# Patient Record
Sex: MALE | Race: WHITE
[De-identification: names, ages, dates, MRNs, and addresses within clinical notes are randomized per-mention and may not be internally consistent; named-entity substitution may affect disease eponyms.]

---

## 2017-12-01 ENCOUNTER — HOSPITAL ENCOUNTER (INPATIENT)
Dept: HOSPITAL 10 - E/R | Age: 57
LOS: 12 days | Discharge: HOME | DRG: 683 | End: 2017-12-13
Payer: COMMERCIAL

## 2017-12-01 VITALS
BODY MASS INDEX: 30.34 KG/M2 | BODY MASS INDEX: 30.34 KG/M2 | WEIGHT: 177.69 LBS | HEIGHT: 64 IN | WEIGHT: 177.69 LBS | HEIGHT: 64 IN

## 2017-12-01 VITALS — SYSTOLIC BLOOD PRESSURE: 181 MMHG | RESPIRATION RATE: 16 BRPM | DIASTOLIC BLOOD PRESSURE: 77 MMHG

## 2017-12-01 VITALS — TEMPERATURE: 97.8 F

## 2017-12-01 DIAGNOSIS — N18.9: ICD-10-CM

## 2017-12-01 DIAGNOSIS — Z79.84: ICD-10-CM

## 2017-12-01 DIAGNOSIS — I87.8: ICD-10-CM

## 2017-12-01 DIAGNOSIS — E11.622: ICD-10-CM

## 2017-12-01 DIAGNOSIS — N40.1: ICD-10-CM

## 2017-12-01 DIAGNOSIS — E03.9: ICD-10-CM

## 2017-12-01 DIAGNOSIS — I12.9: ICD-10-CM

## 2017-12-01 DIAGNOSIS — N17.9: Primary | ICD-10-CM

## 2017-12-01 DIAGNOSIS — E78.5: ICD-10-CM

## 2017-12-01 DIAGNOSIS — E11.42: ICD-10-CM

## 2017-12-01 DIAGNOSIS — D50.9: ICD-10-CM

## 2017-12-01 DIAGNOSIS — E87.1: ICD-10-CM

## 2017-12-01 DIAGNOSIS — E11.22: ICD-10-CM

## 2017-12-01 DIAGNOSIS — R33.8: ICD-10-CM

## 2017-12-01 DIAGNOSIS — D63.1: ICD-10-CM

## 2017-12-01 DIAGNOSIS — L03.116: ICD-10-CM

## 2017-12-01 DIAGNOSIS — I27.20: ICD-10-CM

## 2017-12-01 DIAGNOSIS — L97.929: ICD-10-CM

## 2017-12-01 DIAGNOSIS — E87.5: ICD-10-CM

## 2017-12-01 LAB
ALBUMIN SERPL-MCNC: 3.6 G/DL (ref 3.3–4.9)
ALBUMIN/GLOB SERPL: 1.09 {RATIO}
ALP SERPL-CCNC: 125 IU/L (ref 42–121)
ALT SERPL-CCNC: 40 IU/L (ref 13–69)
ANION GAP SERPL CALC-SCNC: 17 MMOL/L (ref 8–16)
AST SERPL-CCNC: 33 IU/L (ref 15–46)
BASOPHILS # BLD AUTO: 0.1 10^3/UL (ref 0–0.1)
BASOPHILS NFR BLD: 0.6 % (ref 0–2)
BILIRUB DIRECT SERPL-MCNC: 0 MG/DL (ref 0–0.2)
BILIRUB SERPL-MCNC: 0.8 MG/DL (ref 0.2–1.3)
BUN SERPL-MCNC: 35 MG/DL (ref 7–20)
CALCIUM SERPL-MCNC: 8.8 MG/DL (ref 8.4–10.2)
CHLORIDE SERPL-SCNC: 106 MMOL/L (ref 97–110)
CO2 SERPL-SCNC: 23 MMOL/L (ref 21–31)
CREAT SERPL-MCNC: 3 MG/DL (ref 0.61–1.24)
EOSINOPHIL # BLD: 0.4 10^3/UL (ref 0–0.5)
EOSINOPHIL NFR BLD: 3.5 % (ref 0–7)
ERYTHROCYTE [DISTWIDTH] IN BLOOD BY AUTOMATED COUNT: 12.6 % (ref 11.5–14.5)
GLOBULIN SER-MCNC: 3.3 G/DL (ref 1.3–3.2)
GLUCOSE SERPL-MCNC: 126 MG/DL (ref 70–220)
HCT VFR BLD CALC: 32.4 % (ref 42–52)
HGB BLD-MCNC: 11.3 G/DL (ref 14–18)
LYMPHOCYTES # BLD AUTO: 2.5 10^3/UL (ref 0.8–2.9)
LYMPHOCYTES NFR BLD AUTO: 24.4 % (ref 15–51)
MCH RBC QN AUTO: 30.4 PG (ref 29–33)
MCHC RBC AUTO-ENTMCNC: 34.9 G/DL (ref 32–37)
MCV RBC AUTO: 87.1 FL (ref 82–101)
MONOCYTES # BLD: 0.7 10^3/UL (ref 0.3–0.9)
MONOCYTES NFR BLD: 7 % (ref 0–11)
NEUTROPHILS # BLD: 6.5 10^3/UL (ref 1.6–7.5)
NEUTROPHILS NFR BLD AUTO: 64.2 % (ref 39–77)
NRBC # BLD MANUAL: 0 10^3/UL (ref 0–0)
NRBC BLD AUTO-RTO: 0 /100WBC (ref 0–0)
PLATELET # BLD: 163 10^3/UL (ref 140–415)
PMV BLD AUTO: 11.5 FL (ref 7.4–10.4)
POTASSIUM SERPL-SCNC: 5.5 MMOL/L (ref 3.5–5.1)
PROT SERPL-MCNC: 6.9 G/DL (ref 6.1–8.1)
RBC # BLD AUTO: 3.72 10^6/UL (ref 4.7–6.1)
SODIUM SERPL-SCNC: 140 MMOL/L (ref 135–144)
TROPONIN I SERPL-MCNC: < 0.012 NG/ML (ref 0–0.12)
WBC # BLD AUTO: 10.1 10^3/UL (ref 4.8–10.8)

## 2017-12-01 PROCEDURE — 93970 EXTREMITY STUDY: CPT

## 2017-12-01 PROCEDURE — 84484 ASSAY OF TROPONIN QUANT: CPT

## 2017-12-01 PROCEDURE — 96375 TX/PRO/DX INJ NEW DRUG ADDON: CPT

## 2017-12-01 PROCEDURE — 83540 ASSAY OF IRON: CPT

## 2017-12-01 PROCEDURE — 83935 ASSAY OF URINE OSMOLALITY: CPT

## 2017-12-01 PROCEDURE — 82043 UR ALBUMIN QUANTITATIVE: CPT

## 2017-12-01 PROCEDURE — 82150 ASSAY OF AMYLASE: CPT

## 2017-12-01 PROCEDURE — 85025 COMPLETE CBC W/AUTO DIFF WBC: CPT

## 2017-12-01 PROCEDURE — 87040 BLOOD CULTURE FOR BACTERIA: CPT

## 2017-12-01 PROCEDURE — 87086 URINE CULTURE/COLONY COUNT: CPT

## 2017-12-01 PROCEDURE — 82728 ASSAY OF FERRITIN: CPT

## 2017-12-01 PROCEDURE — 84100 ASSAY OF PHOSPHORUS: CPT

## 2017-12-01 PROCEDURE — 84450 TRANSFERASE (AST) (SGOT): CPT

## 2017-12-01 PROCEDURE — 82550 ASSAY OF CK (CPK): CPT

## 2017-12-01 PROCEDURE — 76705 ECHO EXAM OF ABDOMEN: CPT

## 2017-12-01 PROCEDURE — 84153 ASSAY OF PSA TOTAL: CPT

## 2017-12-01 PROCEDURE — 81001 URINALYSIS AUTO W/SCOPE: CPT

## 2017-12-01 PROCEDURE — 96374 THER/PROPH/DIAG INJ IV PUSH: CPT

## 2017-12-01 PROCEDURE — 80061 LIPID PANEL: CPT

## 2017-12-01 PROCEDURE — 84295 ASSAY OF SERUM SODIUM: CPT

## 2017-12-01 PROCEDURE — 84300 ASSAY OF URINE SODIUM: CPT

## 2017-12-01 PROCEDURE — 80053 COMPREHEN METABOLIC PANEL: CPT

## 2017-12-01 PROCEDURE — 82962 GLUCOSE BLOOD TEST: CPT

## 2017-12-01 PROCEDURE — 83036 HEMOGLOBIN GLYCOSYLATED A1C: CPT

## 2017-12-01 PROCEDURE — 84443 ASSAY THYROID STIM HORMONE: CPT

## 2017-12-01 PROCEDURE — 96372 THER/PROPH/DIAG INJ SC/IM: CPT

## 2017-12-01 PROCEDURE — 83930 ASSAY OF BLOOD OSMOLALITY: CPT

## 2017-12-01 PROCEDURE — 36415 COLL VENOUS BLD VENIPUNCTURE: CPT

## 2017-12-01 PROCEDURE — 84460 ALANINE AMINO (ALT) (SGPT): CPT

## 2017-12-01 PROCEDURE — 93306 TTE W/DOPPLER COMPLETE: CPT

## 2017-12-01 PROCEDURE — 82553 CREATINE MB FRACTION: CPT

## 2017-12-01 PROCEDURE — 76775 US EXAM ABDO BACK WALL LIM: CPT

## 2017-12-01 PROCEDURE — 83735 ASSAY OF MAGNESIUM: CPT

## 2017-12-01 PROCEDURE — 83690 ASSAY OF LIPASE: CPT

## 2017-12-01 PROCEDURE — 80048 BASIC METABOLIC PNL TOTAL CA: CPT

## 2017-12-01 PROCEDURE — 93005 ELECTROCARDIOGRAM TRACING: CPT

## 2017-12-01 PROCEDURE — 84439 ASSAY OF FREE THYROXINE: CPT

## 2017-12-01 PROCEDURE — 71010: CPT

## 2017-12-01 PROCEDURE — 84154 ASSAY OF PSA FREE: CPT

## 2017-12-01 PROCEDURE — 74176 CT ABD & PELVIS W/O CONTRAST: CPT

## 2017-12-01 RX ADMIN — HYDRALAZINE HYDROCHLORIDE PRN MG: 20 INJECTION INTRAMUSCULAR; INTRAVENOUS at 23:13

## 2017-12-01 RX ADMIN — HEPARIN SODIUM SCH UNIT: 5000 INJECTION, SOLUTION INTRAVENOUS; SUBCUTANEOUS at 23:30

## 2017-12-01 NOTE — RADRPT
PROCEDURE:   US Lower extremity Venous. 

 

CLINICAL INDICATION:   Bilateral lower extremity edema , pain

 

TECHNIQUE:   Multiple sonographic images of the bilateral lower extremity deep venous system was obt
ained utilizing grayscale, color-flow, compressive sonography and doppler imaging with augmentation.
  The images were reviewed on a PACS workstation. 

 

COMPARISON:   None. 

 

FINDINGS:

There is normal compressibility and flow within the bilateral common femoral, femoral , posterior ti
bial and popliteal veins.  

 

RPTAT: AA

 

IMPRESSION:

No sonographic evidence for deep venous thrombosis.

_____________________________________________ 

.Chandan Gaines MD, MD           Date    Time 

Electronically viewed and signed by .Chandan Gaines MD, MD on 12/01/2017 21:04 

 

D:  12/01/2017 21:04  T:  12/01/2017 21:04

.S/

## 2017-12-01 NOTE — ERD
ER Documentation


Chief Complaint


Chief Complaint


Pt with LLE ulcer X 3 days, u. Pt diabetic





HPI


57-year-old man presents with left medial lower leg redness 3 days after 

scratching it for a few days.  He is also worried about bilateral lower 

extremity edema 3 days, he denies previous lower extremity edema.  He has a 

history of hypertension and states he has been using his medications as 

prescribed.  He denies fevers or chills, no difficulty ambulating, no chest 

pain or shortness of breath, no headache or blurry vision.  Patient denies 

recent trauma.





ROS


All systems reviewed and are negative except as per history of present illness.





Medications


Home Meds


Active Scripts


Cephalexin* (Keflex*) 500 Mg Capsule, 500 MG PO TID for 7 Days, CAP


   Prov:OLE MCKEON MD         12/1/17


Reported Medications


Neomy Sulf/Polymyx B Sulf/Pram (NEOSPORIN + PAIN RELIEF CREAM) 14.2 Gm Cream..g.

, 14.2 GM TP AS NEEDED


   12/1/17


Levothyroxine Sodium* (Levothyroxine Sodium*) 75 Mcg Tablet, 75 MCG PO BEFORE 

BREAKFAST, #30 TAB


   12/1/17


Atorvastatin* (Atorvastatin*) 40 Mg Tablet, 40 MG PO QHS, #30 TAB


   12/1/17


Losartan Potassium* (Losartan Potassium*) 100 Mg Tablet, 100 MG PO DAILY, TAB


   12/1/17


Omega-3 Acid Ethyl Esters (Lovaza) 1 Gm Capsule, 1 GM PO BID, CAP


   12/1/17


Gabapentin* (Gabapentin*) 600 Mg Tablet, 600 MG PO TID, #90 TAB


   12/1/17


Carvedilol* (Carvedilol*) 12.5 Mg Tablet, 12.5 MG PO BID, #60 TAB


   12/1/17





Allergies


Allergies:  


Coded Allergies:  


     No Known Allergy (Unverified , 12/1/17)





PMhx/Soc


Hypertension


History of Surgery:  No


Anesthesia Reaction:  No


Hx Neurological Disorder:  No


Hx Respiratory Disorders:  No


Hx Cardiac Disorders:  Yes (HTN, HYPERLIPIDEMIA)


Hx Psychiatric Problems:  No


Hx Miscellaneous Medical Probl:  Yes (DM)


Hx Alcohol Use:  No (not for 40 years)


Hx Substance Use:  No


Hx Tobacco Use:  No


Smoking Status:  Unknown if ever smoked





FmHx


Family History:  No diabetes





Physical Exam


Vitals





Vital Signs








  Date Time  Temp Pulse Resp B/P Pulse Ox O2 Delivery O2 Flow Rate FiO2


 


12/1/17 22:43  75 15 194/94 100 Room Air  


 


12/1/17 21:29  67 11 191/92 100 Room Air  


 


12/1/17 21:03 97.8 77 16 200/97 100 Room Air  


 


12/1/17 19:38  80 20 215/112 100 Room Air  


 


12/1/17 17:00 98.3 91 18 234/114 100   








Physical Exam


GENERAL: Well-developed, well-nourished, well-hydrated, in no apparent distress

, looks nontoxic in appearance


HEENT: Moist mucous membranes, pink conjunctiva, no cervical spine tenderness 

or step-off deformities, no goiter, no jaundice or icterus, extraocular 

movements intact without pain. No submandibular induration, and no pharyngeal 

erythema


NEURO: Alert and oriented 3, cranial nerves II through XII intact bilaterally, 

pupils equal round reactive to light, no focal deficits or facial asymmetry, 

sensation intact distally Strength 5/5 in upper and lower extremities 

bilaterally


CARDIAC: Regular rate and rhythm, no murmurs rubs or gallops


LUNGS: Clear bilaterally no wheezing crackles or stridor


ABDOMEN: Soft nontender, no guarding, no rigidity, no rebound, no psoas sign no 

obturator sign. Normoactive bowel sounds


SKIN: Warm and dry to touch, there is superficial erythema to the medial left 

lower leg just proximal to the medial ankle a very superficial ulceration is 

noted without active drainage or discharge, no purulence noted.  There is no 

skin induration noted.  There is chronic stasis dermatitis changes noted to 

both lower extremities.


EXTREMITIES: No clubbing cyanosis, 3+ pitting edema in the lower extremities 

bilaterally, calves are bilaterally symmetrical, no Homans sign, no popliteal 

cord sign. Distal pulses equal and bilateral


PSYCH: Normal affect without agitation or irritability


Result Diagram:  


12/1/17 2057 12/1/17 2057





Results 24 hrs





 Laboratory Tests








Test


  12/1/17


20:57


 


White Blood Count 10.110^3/ul 


 


Red Blood Count 3.7210^6/ul 


 


Hemoglobin 11.3g/dl 


 


Hematocrit 32.4% 


 


Mean Corpuscular Volume 87.1fl 


 


Mean Corpuscular Hemoglobin 30.4pg 


 


Mean Corpuscular Hemoglobin


Concent 34.9g/dl 


 


 


Red Cell Distribution Width 12.6% 


 


Platelet Count 85341^3/UL 


 


Mean Platelet Volume 11.5fl 


 


Neutrophils % 64.2% 


 


Lymphocytes % 24.4% 


 


Monocytes % 7.0% 


 


Eosinophils % 3.5% 


 


Basophils % 0.6% 


 


Nucleated Red Blood Cells % 0.0/100WBC 


 


Neutrophils # 6.510^3/ul 


 


Lymphocytes # 2.510^3/ul 


 


Monocytes # 0.710^3/ul 


 


Eosinophils # 0.410^3/ul 


 


Basophils # 0.110^3/ul 


 


Nucleated Red Blood Cells # 0.010^3/ul 


 


Sodium Level 140mmol/L 


 


Potassium Level 5.5mmol/L 


 


Chloride Level 106mmol/L 


 


Carbon Dioxide Level 23mmol/L 


 


Anion Gap 17 


 


Blood Urea Nitrogen 35mg/dl 


 


Creatinine 3.00mg/dl 


 


Glucose Level 126mg/dl 


 


Calcium Level 8.8mg/dl 


 


Total Bilirubin 0.8mg/dl 


 


Direct Bilirubin 0.00mg/dl 


 


Indirect Bilirubin 0.8mg/dl 


 


Aspartate Amino Transf


(AST/SGOT) 33IU/L 


 


 


Alanine Aminotransferase


(ALT/SGPT) 40IU/L 


 


 


Alkaline Phosphatase 125IU/L 


 


Troponin I < 0.012ng/ml 


 


Total Protein 6.9g/dl 


 


Albumin 3.6g/dl 


 


Globulin 3.30g/dl 


 


Albumin/Globulin Ratio 1.09 


 


Lipase 514U/L 








 Current Medications








 Medications


  (Trade)  Dose


 Ordered  Sig/Marium


 Route


 PRN Reason  Start Time


 Stop Time Status Last Admin


Dose Admin


 


 Enalaprilat


  (Vasotec Iv)  1.25 mg  ONCE  ONCE


 IV


   12/1/17 20:00


 12/1/17 20:01 DC 12/1/17 20:53


 


 


 Dextrose


  (D50w Syringe)  25 ml  ONCE  ONCE


 IV


   12/1/17 22:30


 12/1/17 22:31 DC 12/1/17 22:35


 


 


 Insulin Human


 Regular


  (Humulin R)  8 unit  ONCE  ONCE


 IV


   12/1/17 22:30


 12/1/17 22:31 DC 12/1/17 22:40


 


 


 Cephalexin


  (Keflex)  500 mg  ONCE  ONCE


 PO


   12/1/17 22:30


 12/1/17 22:31 DC 12/1/17 22:35


 


 


 IV Flush


  (NS 3 ml)  3 ml  PER PROTOCOL


 IV


   12/1/17 23:00


     


 


 


 Ondansetron HCl


  (Zofran Tab)  4 mg  Q6H  PRN


 PO


 NAUSEA AND/OR VOMITING  12/1/17 23:00


   UNV  


 


 


 Furosemide


  (Lasix)  40 mg  ONCE  ONCE


 IV


   12/1/17 23:00


 12/1/17 23:01   


 


 


 Acetaminophen


  (Tylenol Tab)  650 mg  Q6H  PRN


 PO


 PAIN LEVEL 1-3 OR FEVER  12/1/17 23:00


     


 


 


 Acetaminophen/


 Hydrocodone Bitart


  (Norco (5/325))  1 tab  Q6H  PRN


 PO


 PAIN LEVEL 4-6  12/1/17 23:00


     


 


 


 Heparin Sodium


  (Porcine)


  (Heparin  (5000


 Units/0.5 ml))  5,000 unit  Q8


 SC


   12/1/17 23:00


     


 


 


 Atorvastatin


 Calcium


  (Lipitor)  40 mg  QHS


 PO


   12/2/17 21:00


   UNV  


 


 


 Carvedilol


  (Coreg)  12.5 mg  BID


 PO


   12/1/17 23:00


   UNV  


 


 


 Gabapentin


  (Neurontin)  600 mg  TID


 PO


   12/2/17 09:00


   UNV  


 


 


 Levothyroxine


 Sodium


  (Synthroid)  75 mcg  BEFORE  BREAKFAST


 PO


   12/2/17 07:00


   UNV  


 


 


 Miscellaneous


 Information  1 gm  BID


 PO


   12/2/17 09:00


   UNV  


 


 


 Hydralazine HCl


  (Apresoline)  10 mg  Q4H  PRN


 IV


 ELEVATED BLOOD PRESSURE  12/1/17 23:00


   UNV  


 


 


 Miscellaneous


 Information


  (* Miscellaneous


 Pharmacy Order)  Discontinue


 current oral


 sulfonylur...  ONCE  ONCE


 XX


   12/1/17 23:00


 12/1/17 23:01 UNV  


 


 


 Diagnostic Test


  (Pha)


  (Accu-Chek)  1 ea  02


 XX


   12/2/17 02:00


   UNV  


 


 


 Miscellaneous


 Information


  (* Miscellaneous


 Pharmacy Order)  HYPOGLYCEMIA


 PROTOCOL


 w...  ONCE  ONCE


 XX


   12/1/17 23:00


 12/1/17 23:01 UNV  


 


 


 Insulin Aspart


  (Novolog Insulin


 Pen)  NOVOLOG


 *MILD*


 ALGORITHM  WITH MEALS  BEDTIME


 SC


   12/2/17 08:00


   UNV  


 


 


 Miscellaneous


 Information


  (* Miscellaneous


 Pharmacy Order)  Discontinue


 all


 previ...  ONCE  ONCE


 XX


   12/1/17 23:00


 12/1/17 23:01 UNV  


 











Procedures/MDM


IV line was established patient was placed on cardiac monitor rhythm strip 

revealed a sinus rhythm at about 80 bpm with upright P and T waves.  Patient 

was afebrile





EKG performed, read by me: Reveals a normal sinus rhythm at 78 bpm, normal axis

, right ventricular conduction delay with incarceration of 110 ms, LVH in lead I

, no concerning ST elevations or depressions noted.





Chest X-ray 1V Interpreted by me:


Soft Tissue:                                               No acute 

abnormalities


Bones:                                                    No acute abnormalities


Mediastinum/Cardiac Silhouette/Lungs:     No acute abnormalities





Vascular Doppler ultrasound of the bilateral lower extremities was performed, 

no acute deep vein thrombosis was noted.





I administered enalapril 1.25 mg IV for hypertension.





CBC was unremarkable, electrolytes revealed renal failure with a BUN/creatinine 

of 35/3, acute hyperkalemia of 5.5, liver function tests are normal, troponin 

was negative.





I administered dextrose 25 g IV 1 and regular insulin 8 units IV 1 for acute 

hyperkalemia.  I also administered cephalexin 500 mg p.o. 1 for superficial 

left lower extremity leg ulcer





Patient has no history of renal issues and recently developed lower extremity 

edema, creatinine today was 3.0 with elevated potassium.  He will be admitted 

to telemetry setting for continued medical management.





Departure


Diagnosis:  


 Primary Impression:  


 Stasis dermatitis of both legs


 Additional Impressions:  


 Hypertension


 Hypertension type:  essential hypertension  Qualified Code:  I10 - Essential 

hypertension


 Acute hyperkalemia


 Acute renal failure


 Acute renal failure type:  unspecified  Qualified Code:  N17.9 - Acute renal 

failure, unspecified acute renal failure type


 Venous ulcer of leg


 Laterality:  left  Qualified Code:  I83.029 - Venous stasis ulcer of left 

lower extremity


Condition:  OLE Banda MD Dec 1, 2017 19:59

## 2017-12-01 NOTE — RADRPT
PROCEDURE:   XR Chest. 

 

CLINICAL INDICATION:    chest pain

 

TECHNIQUE:   Single frontal view of the chest was obtained 

 

COMPARISON:   None 

 

FINDINGS:

The heart and mediastinum are within normal limits.  

The lungs are clear.

There is no pleural effusion or pneumothorax.   

 

RPTAT: AA

 

IMPRESSION:

No acute disease.

_____________________________________________ 

.Chandan Gaines MD, MD           Date    Time 

Electronically viewed and signed by .Chandan Gaines MD, MD on 12/01/2017 22:36 

 

D:  12/01/2017 22:36  T:  12/01/2017 22:36

.S/

## 2017-12-02 VITALS — SYSTOLIC BLOOD PRESSURE: 109 MMHG | RESPIRATION RATE: 18 BRPM | DIASTOLIC BLOOD PRESSURE: 64 MMHG | HEART RATE: 71 BPM

## 2017-12-02 VITALS — HEART RATE: 80 BPM

## 2017-12-02 VITALS — SYSTOLIC BLOOD PRESSURE: 135 MMHG | RESPIRATION RATE: 17 BRPM | DIASTOLIC BLOOD PRESSURE: 65 MMHG

## 2017-12-02 VITALS — SYSTOLIC BLOOD PRESSURE: 100 MMHG | DIASTOLIC BLOOD PRESSURE: 57 MMHG | RESPIRATION RATE: 20 BRPM

## 2017-12-02 VITALS — HEART RATE: 75 BPM

## 2017-12-02 VITALS — SYSTOLIC BLOOD PRESSURE: 156 MMHG | DIASTOLIC BLOOD PRESSURE: 76 MMHG | HEART RATE: 81 BPM

## 2017-12-02 VITALS — SYSTOLIC BLOOD PRESSURE: 185 MMHG | DIASTOLIC BLOOD PRESSURE: 94 MMHG | RESPIRATION RATE: 18 BRPM

## 2017-12-02 VITALS — DIASTOLIC BLOOD PRESSURE: 73 MMHG | SYSTOLIC BLOOD PRESSURE: 144 MMHG

## 2017-12-02 VITALS — DIASTOLIC BLOOD PRESSURE: 75 MMHG | SYSTOLIC BLOOD PRESSURE: 167 MMHG | RESPIRATION RATE: 19 BRPM

## 2017-12-02 VITALS — SYSTOLIC BLOOD PRESSURE: 194 MMHG | DIASTOLIC BLOOD PRESSURE: 96 MMHG | HEART RATE: 70 BPM

## 2017-12-02 VITALS — HEART RATE: 76 BPM | DIASTOLIC BLOOD PRESSURE: 87 MMHG | SYSTOLIC BLOOD PRESSURE: 176 MMHG

## 2017-12-02 VITALS — HEART RATE: 68 BPM

## 2017-12-02 LAB
ADD UMIC: YES
ALBUMIN SERPL-MCNC: 2.9 G/DL (ref 3.3–4.9)
ALBUMIN/GLOB SERPL: 0.85 {RATIO}
ALP SERPL-CCNC: 105 IU/L (ref 42–121)
ALT SERPL-CCNC: 36 IU/L (ref 13–69)
ANION GAP SERPL CALC-SCNC: 11 MMOL/L (ref 8–16)
AST SERPL-CCNC: 27 IU/L (ref 15–46)
BASOPHILS # BLD AUTO: 0.1 10^3/UL (ref 0–0.1)
BASOPHILS NFR BLD: 0.5 % (ref 0–2)
BILIRUB DIRECT SERPL-MCNC: 0 MG/DL (ref 0–0.2)
BILIRUB SERPL-MCNC: 0.8 MG/DL (ref 0.2–1.3)
BUN SERPL-MCNC: 38 MG/DL (ref 7–20)
CALCIUM SERPL-MCNC: 8.8 MG/DL (ref 8.4–10.2)
CHLORIDE SERPL-SCNC: 107 MMOL/L (ref 97–110)
CHOLEST SERPL-MCNC: 155 MG/DL (ref 100–200)
CHOLEST/HDLC SERPL: 6.7 RATIO
CO2 SERPL-SCNC: 26 MMOL/L (ref 21–31)
COLOR UR: (no result)
CREAT SERPL-MCNC: 3.49 MG/DL (ref 0.61–1.24)
EOSINOPHIL # BLD: 0.3 10^3/UL (ref 0–0.5)
EOSINOPHIL NFR BLD: 3.5 % (ref 0–7)
ERYTHROCYTE [DISTWIDTH] IN BLOOD BY AUTOMATED COUNT: 12.4 % (ref 11.5–14.5)
GLOBULIN SER-MCNC: 3.4 G/DL (ref 1.3–3.2)
GLUCOSE SERPL-MCNC: 182 MG/DL (ref 70–220)
GLUCOSE UR STRIP-MCNC: (no result) MG/DL
HCT VFR BLD CALC: 29.2 % (ref 42–52)
HDLC SERPL-MCNC: 23 MG/DL (ref 30–78)
HGB BLD-MCNC: 10.4 G/DL (ref 14–18)
IRON SERPL-MCNC: 34 UG/DL (ref 35–150)
KETONES UR STRIP.AUTO-MCNC: NEGATIVE MG/DL
LYMPHOCYTES # BLD AUTO: 2.1 10^3/UL (ref 0.8–2.9)
LYMPHOCYTES NFR BLD AUTO: 23.1 % (ref 15–51)
MAGNESIUM SERPL-MCNC: 1.7 MG/DL (ref 1.7–2.5)
MCH RBC QN AUTO: 31.2 PG (ref 29–33)
MCHC RBC AUTO-ENTMCNC: 35.6 G/DL (ref 32–37)
MCV RBC AUTO: 87.7 FL (ref 82–101)
MONOCYTES # BLD: 0.6 10^3/UL (ref 0.3–0.9)
MONOCYTES NFR BLD: 6.1 % (ref 0–11)
NEUTROPHILS # BLD: 6.1 10^3/UL (ref 1.6–7.5)
NEUTROPHILS NFR BLD AUTO: 66.5 % (ref 39–77)
NITRITE UR QL STRIP.AUTO: NEGATIVE MG/DL
NRBC # BLD MANUAL: 0 10^3/UL (ref 0–0)
NRBC BLD AUTO-RTO: 0 /100WBC (ref 0–0)
PLATELET # BLD: 149 10^3/UL (ref 140–415)
PMV BLD AUTO: 11.8 FL (ref 7.4–10.4)
POTASSIUM SERPL-SCNC: 4.5 MMOL/L (ref 3.5–5.1)
PROT SERPL-MCNC: 6.3 G/DL (ref 6.1–8.1)
RBC # BLD AUTO: 3.33 10^6/UL (ref 4.7–6.1)
RBC # UR AUTO: NEGATIVE MG/DL
SODIUM SERPL-SCNC: 139 MMOL/L (ref 135–144)
TIBC SERPL-MCNC: 201 UG/DL (ref 241–421)
TRIGL SERPL-MCNC: 243 MG/DL (ref 0–149)
TSH SERPL-ACNC: 7.7 MIU/L (ref 0.47–4.68)
UR ASCORBIC ACID: NEGATIVE MG/DL
UR BILIRUBIN (DIP): NEGATIVE MG/DL
UR CLARITY: CLEAR
UR PH (DIP): 7 (ref 5–9)
UR RBC: 1 /HPF (ref 0–5)
UR SPECIFIC GRAVITY (DIP): 1.01 (ref 1–1.03)
UR TOTAL PROTEIN (DIP): (no result) MG/DL
UROBILINOGEN UR STRIP-ACNC: NEGATIVE MG/DL
WBC # BLD AUTO: 9.2 10^3/UL (ref 4.8–10.8)
WBC # UR STRIP: NEGATIVE LEU/UL

## 2017-12-02 RX ADMIN — OMEGA-3 FATTY ACIDS CAP DELAYED RELEASE 1000 MG SCH MG: 1000 CAPSULE DELAYED RELEASE at 08:37

## 2017-12-02 RX ADMIN — Medication SCH EACH: at 08:38

## 2017-12-02 RX ADMIN — HEPARIN SODIUM SCH UNIT: 5000 INJECTION, SOLUTION INTRAVENOUS; SUBCUTANEOUS at 21:54

## 2017-12-02 RX ADMIN — HYDRALAZINE HYDROCHLORIDE PRN MG: 20 INJECTION INTRAMUSCULAR; INTRAVENOUS at 08:41

## 2017-12-02 RX ADMIN — ATORVASTATIN CALCIUM SCH MG: 40 TABLET, FILM COATED ORAL at 00:48

## 2017-12-02 RX ADMIN — GABAPENTIN SCH MG: 300 CAPSULE ORAL at 08:38

## 2017-12-02 RX ADMIN — CLINDAMYCIN HYDROCHLORIDE SCH MG: 300 CAPSULE ORAL at 17:31

## 2017-12-02 RX ADMIN — LEVOTHYROXINE SODIUM SCH MCG: 75 TABLET ORAL at 06:52

## 2017-12-02 RX ADMIN — TERAZOSIN HYDROCHLORIDE ANHYDROUS SCH MG: 1 CAPSULE ORAL at 21:48

## 2017-12-02 RX ADMIN — HEPARIN SODIUM SCH UNIT: 5000 INJECTION, SOLUTION INTRAVENOUS; SUBCUTANEOUS at 05:31

## 2017-12-02 RX ADMIN — SODIUM FERRIC GLUCONATE COMPLEX SCH MLS/HR: 12.5 INJECTION INTRAVENOUS at 16:07

## 2017-12-02 RX ADMIN — GABAPENTIN SCH MG: 300 CAPSULE ORAL at 20:45

## 2017-12-02 RX ADMIN — HEPARIN SODIUM SCH UNIT: 5000 INJECTION, SOLUTION INTRAVENOUS; SUBCUTANEOUS at 13:51

## 2017-12-02 RX ADMIN — NIFEDIPINE SCH MG: 60 TABLET, FILM COATED, EXTENDED RELEASE ORAL at 20:47

## 2017-12-02 RX ADMIN — ATORVASTATIN CALCIUM SCH MG: 40 TABLET, FILM COATED ORAL at 20:45

## 2017-12-02 RX ADMIN — CLINDAMYCIN HYDROCHLORIDE SCH MG: 300 CAPSULE ORAL at 12:42

## 2017-12-02 RX ADMIN — Medication SCH EACH: at 20:44

## 2017-12-02 RX ADMIN — NIFEDIPINE SCH MG: 60 TABLET, FILM COATED, EXTENDED RELEASE ORAL at 12:42

## 2017-12-02 RX ADMIN — CLINDAMYCIN HYDROCHLORIDE SCH MG: 300 CAPSULE ORAL at 05:31

## 2017-12-02 RX ADMIN — OMEGA-3 FATTY ACIDS CAP DELAYED RELEASE 1000 MG SCH MG: 1000 CAPSULE DELAYED RELEASE at 20:44

## 2017-12-02 RX ADMIN — GABAPENTIN SCH MG: 300 CAPSULE ORAL at 12:42

## 2017-12-02 NOTE — RADRPT
PROCEDURE:   US abdomen limited right upper quadrant. 

 

CLINICAL INDICATION:  Elevated lipase levels

 

TECHNIQUE:   Multiple real-time images were acquired of the patient's right upper quadrant of the ab
domen utilizing a high resolution transducer. 

 

COMPARISON:   None 

 

FINDINGS:

 

No gallstones are identified within the gallbladder.  

There is no pericholecystic fluid or gallbladder wall thickening.  

The common bile duct measures 2.7 mm in maximal dimension.  

No free fluid is identified. No abnormality of the liver is seen. The length of the right lobe of th
e liver equals 15.7 cm, within normal limits. There is hepatopetal portal venous flow. The pancreas 
is not well seen due to bowel gas. The right kidney measures 11.2 cm in length and is unremarkable.

 

IMPRESSION:

Pancreas not well seen. Otherwise unremarkable examination. Please see above.

 

 

RPTAT: HJES

_____________________________________________ 

.Bolivar Do MD, MD           Date    Time 

Electronically viewed and signed by .Bolivar Do MD, MD on 12/02/2017 16:47 

 

D:  12/02/2017 16:47  T:  12/02/2017 16:47

.S/

## 2017-12-02 NOTE — CONS
DATE OF ADMISSION: 12/01/2017

DATE OF CONSULTATION:  12/02/2017

 

 

 

INFECTIOUS DISEASE CONSULTATION

 

REASON FOR CONSULTATION:  Antibiotic management.

 

HISTORY OF PRESENT ILLNESS:  Eliud Hall is a 57-year-old  male who comes in with numerou
s problems, especially left lower extremity cellulitis and is being seen for antibiotic management. 
 His past problems include:

1.  Adult-onset diabetes mellitus with peripheral neuropathy.

2.  Hypertension.

3.  Hypothyroidism.

4.  Hyperlipidemia.  

 

Acutely, he comes in with cellulitis versus wound from his left lower extremity.  He notes that he h
as had redness the last 3 days after scratching the wound.  He has bilateral lower extremity edema. 
 He denies fever or chills.  On admission, his white count is 10.1, H and H 11.3 and 32.4, platelet 
count 162,000.  BUN and creatinine 35/3.0.

 

PAST MEDICAL HISTORY:  Operations:  None.

 

FAMILY HISTORY:  Noncontributory.

 

SOCIAL HISTORY:  He does not smoke, drink or abuse drugs.

 

ALLERGIES:  NONE TO PENICILLIN, SULFA OR FOODS.

 

MEDICATIONS:  Per chart.

 

REVIEW OF SYSTEMS:  Noncontributory.

 

PHYSICAL EXAMINATION:

GENERAL:  The patient is a well-developed, well-nourished male, alert, responsive, in no acute distr
ess.

VITAL SIGNS:  Stable.  He is afebrile.

SKIN:  Without generalized rash.

HEENT:  Within normal limits.

NECK:  Supple.

LYMPH NODES:  None palpable.

CHEST:  Decreased breath sounds at the bases.

HEART:  Without murmur or gallop.

ABDOMEN:  Soft, nontender, without organosplenomegaly or masses.

EXTREMITIES:  1+ pitting edema.

RECTAL/GENITAL:  Exam is deferred.

NEUROLOGIC:  No focal neurological abnormalities.  His left lower extremity is erythematous above th
e medial malleolus with warmth to palpation.

 

IMPRESSION AND PLAN:  The patient was begun on antibiotic therapy.  He is on clindamycin.  His cultu
res have been done.  We will continue him on current therapy.  I will dictate my findings to the [facility]
pitalist.  At present, we do not have cultures pending and so will order some blood cultures.

 

 

Dictated By: JERROLD DREYER MD JD/JUAN ALBERTO

DD:    12/02/2017 10:21:12

DT:    12/02/2017 11:33:36

Conf#: [id number]

DID#:  [id number]

CC: MARCOS BOYLE MD;*[facility]*

## 2017-12-02 NOTE — CONS
Date/Time of Note


Date/Time of Note


DATE: 12/2/17 


TIME: 11:27





Assessment/Plan


Assessment/Plan


Additional Assessment/Plan


ASSESSMENT:


1. Non Oliguric Acute kidney injury vs acute kidney injury on CKD 


2. rule out obstructive uropathy


3. BPH


4. H/o possible CKD due to DM nephropathy, pt is not aware about it or has not 

seen any nephrologist as outpatient, I doubt it 


5. DM II


6. HTN


7. Anemia combinatino of iron deficiency anemia and anemia of chronic renal 

disease 


8. LE significant edmea, US negative for DVT, will order ECHO to assess for 

diastolic function 


9. Accelerated HTN





Plan:


Renal US showed normal size kidneys and normal echogenicity- it showed  

moderately enarlaged prostate causing possible obstructive nephropathy


Urine studies ordered


IV iron 100 mg daily X 5 days


CK total with uric acid in AM labs 


Place schroeder catheter, Bladder scan showed 1000ml- will start Hytrin and Flomax


increase procardia XL to 60mg PO BID 





Thanks for consultation, we will continue to follow





Consultation Date/Type/Reason


Admit Date/Time


Dec 1, 2017 at 22:22


Date of Consultation:  Dec 2, 2017


Type of Consultation:  NEPHROLOGY


Reason for Consultation


acute on chronic renal failure, Hyperkalemia


Referring Provider:  MARCOS BOYLE





Hx of Present Illness


57 M with PMHx of HTN, HL, DM, pt is not sure about having CKD- he presented 

with  Left lower extremity rash, lower extremely swollen- pt is noted to have 

BUN/Cr 38/3.49. pt denies h/o CKD, seeing any nephrologist outside.


currently c/o leg edema and leg pain, Cr still high, pt denies any voiding 

complaints but he is noted to have 1000ml on bladder Scan just done by us.


leg pain, leg edema


Constitutional:  no complaints


Eyes:  no complaints


ENT:  no complaints


Respiratory:  no complaints


Cardiovascular:  no complaints


Gastrointestinal:  no complaints


Genitourinary:  flank pain, no complaints


Musculoskeletal:  back pain, bone/joint pain, neck pain, no complaints, other (

leg edema ), swelling


Skin:  no complaints


Neurologic:  no complaints


Endocrine:  no complaints


Lymphatic:  no complaints


Psychological:  no complaints


Immunologic:  no complaints





Past Surgical History


Past Surgical Hx:  no surgical history





Social History


Alcohol Use:  none


Smoking Status:  Never smoker


Drug Use:  none





Exam/Review of Systems


Vital Signs


Vitals





 Vital Signs








  Date Time  Temp Pulse Resp B/P Pulse Ox O2 Delivery O2 Flow Rate FiO2


 


12/2/17 08:50  76  176/87    


 


12/2/17 08:00 97.4  18  99   


 


12/1/17 22:43      Room Air  














 Intake and Output   


 


 12/1/17 12/1/17 12/2/17





 15:00 23:00 07:00


 


Intake Total   300 ml


 


Output Total   2 ml


 


Balance   298 ml











Exam


Constitutional:  alert


Psych:  no complaints


Head:  normocephalic


Eyes:  nl conjunctiva


Neck:  non-tender, supple


Respiratory:  clear to auscultation, diminished breath sounds, normal air 

movement


Cardiovascular:  nl pulses, regular rate and rhythm


Gastrointestinal:  nl liver, spleen, non-tender, soft


Musculoskeletal:  nl extremities to inspection


Extremities:  normal pulses


Neurological:  CNS II-XII intact, nl mental status, nl speech, nl strength





Results


Result Diagram:  


12/2/17 0532                                                                   

             12/2/17 0532





Results 24 hrs





Laboratory Tests








Test


  12/1/17


20:57 12/2/17


00:10 12/2/17


05:32 12/2/17


07:20


 


White Blood Count 10.1    9.2   


 


Red Blood Count 3.72  L  3.33  L 


 


Hemoglobin 11.3  L  10.4  L 


 


Hematocrit 32.4  L  29.2  L 


 


Mean Corpuscular Volume 87.1    87.7   


 


Mean Corpuscular Hemoglobin 30.4    31.2   


 


Mean Corpuscular Hemoglobin


Concent 34.9  


  


  35.6  


  


 


 


Red Cell Distribution Width 12.6    12.4   


 


Platelet Count 163    149   


 


Mean Platelet Volume 11.5  H  11.8  H 


 


Neutrophils % 64.2    66.5   


 


Lymphocytes % 24.4    23.1   


 


Monocytes % 7.0    6.1   


 


Eosinophils % 3.5    3.5   


 


Basophils % 0.6    0.5   


 


Nucleated Red Blood Cells % 0.0    0.0   


 


Neutrophils # 6.5    6.1   


 


Lymphocytes # 2.5    2.1   


 


Monocytes # 0.7    0.6   


 


Eosinophils # 0.4    0.3   


 


Basophils # 0.1    0.1   


 


Nucleated Red Blood Cells # 0.0    0.0   


 


Sodium Level 140    139   


 


Potassium Level 5.5  H  4.5   


 


Chloride Level 106    107   


 


Carbon Dioxide Level 23    26   


 


Anion Gap 17  H  11   


 


Blood Urea Nitrogen 35  H  38  H 


 


Creatinine 3.00  H  3.49  H 


 


Glucose Level 126    182   


 


Osmolality 299  H   


 


Calcium Level 8.8    8.8   


 


Total Bilirubin 0.8    0.8   


 


Direct Bilirubin 0.00    0.00   


 


Indirect Bilirubin 0.8    0.8   


 


Aspartate Amino Transf


(AST/SGOT) 33  


  


  27  


  


 


 


Alanine Aminotransferase


(ALT/SGPT) 40  


  


  36  


  


 


 


Alkaline Phosphatase 125  H  105   


 


Troponin I < 0.012     


 


Total Protein 6.9    6.3   


 


Albumin 3.6    2.9  L 


 


Globulin 3.30  H  3.40  H 


 


Albumin/Globulin Ratio 1.09    0.85   


 


Lipase 514  H  1612  H 


 


Bedside Glucose  96    


 


Hemoglobin A1c   6.3  H 


 


Magnesium Level   1.7   


 


Iron Level   34  L 


 


Total Iron Binding Capacity   201  L 


 


Percent Iron Saturation   17  L 


 


Ferritin   319.0  H 


 


Triglycerides Level   243  H 


 


Cholesterol Level   155   


 


LDL Cholesterol, Calculated   83   


 


HDL Cholesterol   23  L 


 


Cholesterol/HDL Ratio   6.7   


 


Thyroid Stimulating Hormone


(TSH) 


  


  7.700  H


  


 


 


Free Thyroxine   0.97   


 


Urine Color    STRAW  


 


Urine Clarity    CLEAR  


 


Urine pH    7.0  


 


Urine Specific Gravity    1.008  


 


Urine Ketones    NEGATIVE  


 


Urine Nitrite    NEGATIVE  


 


Urine Bilirubin    NEGATIVE  


 


Urine Urobilinogen    NEGATIVE  


 


Urine Leukocyte Esterase    NEGATIVE  


 


Urine Microscopic RBC    1  


 


Urine Microscopic WBC    1  


 


Urine Hemoglobin    NEGATIVE  


 


Urine Osmolality    290  


 


Urine Random Sodium    87  


 


Urine Glucose    2+  H


 


Urine Total Protein    3+  H














Test


  12/2/17


08:05 


  


  


 


 


Bedside Glucose 144     











Medications


Medications





 Current Medications


Ondansetron HCl (Zofran Tab) 4 mg Q6H  PRN PO NAUSEA AND/OR VOMITING;  Start 12/ 1/17 at 23:00


Acetaminophen (Tylenol Tab) 650 mg Q6H  PRN PO PAIN LEVEL 1-3 OR FEVER;  Start 

12/1/17 at 23:00


Acetaminophen/ Hydrocodone Bitart (Norco (5/325)) 1 tab Q6H  PRN PO PAIN LEVEL 4

-6;  Start 12/1/17 at 23:00


Heparin Sodium (Porcine) (Heparin  (5000 Units/0.5 ml)) 5,000 unit Q8 SC  Last 

administered on 12/2/17at 05:31; Admin Dose 5,000 UNIT;  Start 12/1/17 at 23:00


Atorvastatin Calcium (Lipitor) 40 mg QHS PO  Last administered on 12/2/17at 00:

48; Admin Dose 40 MG;  Start 12/1/17 at 23:00


Carvedilol (Coreg) 12.5 mg BID PO  Last administered on 12/2/17at 08:37; Admin 

Dose 12.5 MG;  Start 12/1/17 at 23:00


Gabapentin (Neurontin) 600 mg TID PO  Last administered on 12/2/17at 08:38; 

Admin Dose 600 MG;  Start 12/2/17 at 09:00


Hydralazine HCl (Apresoline) 10 mg Q4H  PRN IV ELEVATED SYSTOLIC BP Last 

administered on 12/2/17at 08:41; Admin Dose 10 MG;  Start 12/1/17 at 23:00


Diagnostic Test (Pha) (Accu-Chek) 1 ea 02 XX ;  Start 12/2/17 at 02:00


Miscellaneous Information 1 ea NOTE XX ;  Start 12/1/17 at 23:00


Glucose (Glutose) 15 gm Q15M  PRN PO DECREASED GLUCOSE;  Start 12/1/17 at 23:00


Glucose (Glutose) 22.5 gm Q15M  PRN PO DECREASED GLUCOSE;  Start 12/1/17 at 23:

00


Dextrose (D50w Syringe) 25 ml Q15M  PRN IV DECREASED GLUCOSE;  Start 12/1/17 at 

23:00


Dextrose (D50w Syringe) 50 ml Q15M  PRN IV DECREASED GLUCOSE;  Start 12/1/17 at 

23:00


Glucagon (Glucagen) 1 mg Q15M  PRN IM DECREASED GLUCOSE;  Start 12/1/17 at 23:00


Glucose (Glutose) 15 gm Q15M  PRN BUCCAL DECREASED GLUCOSE;  Start 12/1/17 at 23

:00


Fish Oil (Fish Oil) 1,000 mg BID PO  Last administered on 12/2/17at 08:37; 

Admin Dose 1,000 MG;  Start 12/2/17 at 09:00


Clindamycin HCl (Cleocin) 300 mg Q6 PO  Last administered on 12/2/17at 05:31; 

Admin Dose 300 MG;  Start 12/2/17 at 06:00;  Stop 12/12/17 at 05:59


Lactobacillus Acidophilus (Florajen3 Capsule) 1 each BID PO  Last administered 

on 12/2/17at 08:38; Admin Dose 1 EACH;  Start 12/2/17 at 09:00


Nifedipine (Procardia Xl) 30 mg BID PO ;  Start 12/2/17 at 09:30











SANTIAGO MADISON MD Dec 2, 2017 11:37

## 2017-12-02 NOTE — HP
Date/Time of Note


Date/Time of Note


DATE: 12/2/17 


TIME: 06:14





Assessment/Plan


VTE Prophylaxis


VTE Prophylaxis Intervention:  heparin





Lines/Catheters


Urinary Cath still in place:  No





Assessment/Plan


Chief Complaint/Hosp Course


This is a 57-year-old male being admitted to the telemetry floor for:





#1  Renal failure, acute versus chronic: Patient does not have a previous 

diagnosis of any kidney problems as per him.  At the current time will check 

renal ultrasound, will check urine microscope, urinalysis, urine sodium, 

osmolality.  As patient does have some bilateral lower extremity edema will 

also give the patient a dose of Lasix.  Repeat renal studies in the a.m.  

Patient was also started on Keflex in the ED for his left lower extremity wound/

cellulitis.  I will hold this at this time, and switch him to clindamycin as it 

does not require renal dosing.  States that he still urinates.  Neurology 

consultation.





#2 skin lesion: Cellulitis versus wound: There is no drainage noted.  At the 

current time will treat as a cellulitis with clindamycin.  He did initially 

receive Keflex in the ED however secondary #1 I will avoid that.  Will get 

wound care consult and continue to observe.





#3 elevated lipase: At the current time the patient is not complaining of any 

abdominal pain or any nausea vomiting.  I do not suspect any overt acute 

pancreatitis, however I will repeat a lipase in the a.m.  If there is 

development of any abdominal pain or symptoms consistent with pancreatitis, the 

patient n.p.o. and put him on fluid hydration.





#4 diabetes mellitus: We will hold patient's home medications, insulin sliding 

scale, check hemoglobin A1c and will continue gabapentin for neuropathy





#5 hypertension: Continue beta-blocker, will hold losartan secondary to #1.  

Will put on as needed hydralazine for systolic greater than 180.





#6  Hypothyroidism: We will continue Synthroid, will check a TSH level





#7 DVT and GI prophylaxis: Heparin subq, no GI prophylaxis indicated





Further treatment strategy will be implemented as per the clinical course


Problems:  





HPI/ROS


Admit Date/Time


Admit Date/Time


Dec 1, 2017 at 22:22





Hx of Present Illness


Chief complaint: Left lower extremity rash, lower extremely swollen





57-year-old man presents with left medial lower leg redness 3 days after 

scratching it for a few days.  He is also worried about bilateral lower 

extremity edema 3 days, he denies previous lower extremity edema.  He has a 

history of hypertension and states he has been using his medications as 

prescribed.  He denies fevers or chills, no difficulty ambulating, no chest 

pain or shortness of breath, no headache or blurry vision.  Patient denies 

recent trauma.





Allergies: NKDA





Medications: See MAR





ROS


Const: As per HPI


Eyes : No pain discharge or redness or change in visual acuity


ENT: No pain, sore throat, congestion, congestion,  dysphagia or discharge


Respiratory: No shortness of breath, cough, sputum, wheezing, or pleuritic pain


Cardiovascular: No chest pain, palpitation, PND, or edema


GI : no change in appetite, abdominal pain, nausea, vomiting, diarrhea, 

constipation, or change in the color his stool 


Genitourinary: No dysuria, hematuria, flank pain ,  discharge or CVA tenderness


Musculoskeletal: As per HPI


Skin: As per HPI


Neuro: No headache, dizziness, syncope, seizure, focal weakness


Endocrine: No polyuria, polydipsia, temperature intolerance


Psych: No hallucination, depression, anxiety or suicidal ideation





PMH/Family/Social


Past Medical History


Diabetes mellitus with peripheral neuropathy, hypertension, hypothyroidism, 

hyperlipidemia





Past Surgical History


Past Surgical Hx:  no surgical history





Family History


Significant Family History:  diabetes





Social History


Alcohol Use:  none


Smoking Status:  Never smoker


Drug Use:  none





Exam/Review of Systems


Vital Signs


Vitals





 Vital Signs








  Date Time  Temp Pulse Resp B/P Pulse Ox O2 Delivery O2 Flow Rate FiO2


 


12/2/17 04:49 98.2 78 19 167/75 97   


 


12/1/17 22:43      Room Air  














 Intake and Output   


 


 12/1/17 12/1/17 12/2/17





 14:59 22:59 06:59


 


Intake Total   300 ml


 


Output Total   2 ml


 


Balance   298 ml











Exam


Exam





General: Patient is well-developed well-nourished


The patient is alert oriented -3 lying comfortably in bed.


HEENT: Atraumatic, normocephalic. The pupils are equal, round and reactive. 

Extraocular motor are intact


Neck: Supple with full range of motion. No rigidity or meningismus


Chest: Nontender


Lungs: Clear to auscultation bilaterally no crackles rales or wheezing


Heart: Normal S1-S2, Regular rhythm and rate. No murmur, S3, or S4


Abdomen: Soft , nontender,  nondistended , bowel sounds are present. No 

guarding no rebound tenderness , No masses or organomegaly. No costovertebral 

temporal angle mass


Extremities: 1+ pitting edema of the bilateral lower extremities


Neurologic: Normal mental status, speech normal, cranial nerves II through XII 

are intact, motor and sensory are intact, no focal weakness


Skin: Left lower extremity erythema noted above the medial malleolus, mild 

warmth to palpation





Labs


Result Diagram:  


12/1/17 2057 12/1/17 2057








Medications


Medications





 Current Medications


Ondansetron HCl (Zofran Tab) 4 mg Q6H  PRN PO NAUSEA AND/OR VOMITING;  Start 12/ 1/17 at 23:00


Acetaminophen (Tylenol Tab) 650 mg Q6H  PRN PO PAIN LEVEL 1-3 OR FEVER;  Start 

12/1/17 at 23:00


Acetaminophen/ Hydrocodone Bitart (Norco (5/325)) 1 tab Q6H  PRN PO PAIN LEVEL 4

-6;  Start 12/1/17 at 23:00


Heparin Sodium (Porcine) (Heparin  (5000 Units/0.5 ml)) 5,000 unit Q8 SC  Last 

administered on 12/2/17at 05:31; Admin Dose 5,000 UNIT;  Start 12/1/17 at 23:00


Atorvastatin Calcium (Lipitor) 40 mg QHS PO  Last administered on 12/2/17at 00:

48; Admin Dose 40 MG;  Start 12/1/17 at 23:00


Carvedilol (Coreg) 12.5 mg BID PO  Last administered on 12/2/17at 00:49; Admin 

Dose 12.5 MG;  Start 12/1/17 at 23:00


Gabapentin (Neurontin) 600 mg TID PO ;  Start 12/2/17 at 09:00


Hydralazine HCl (Apresoline) 10 mg Q4H  PRN IV ELEVATED SYSTOLIC BP Last 

administered on 12/1/17at 23:13; Admin Dose 10 MG;  Start 12/1/17 at 23:00


Diagnostic Test (Pha) (Accu-Chek) 1 ea 02 XX ;  Start 12/2/17 at 02:00


Miscellaneous Information 1 ea NOTE XX ;  Start 12/1/17 at 23:00


Glucose (Glutose) 15 gm Q15M  PRN PO DECREASED GLUCOSE;  Start 12/1/17 at 23:00


Glucose (Glutose) 22.5 gm Q15M  PRN PO DECREASED GLUCOSE;  Start 12/1/17 at 23:

00


Dextrose (D50w Syringe) 25 ml Q15M  PRN IV DECREASED GLUCOSE;  Start 12/1/17 at 

23:00


Dextrose (D50w Syringe) 50 ml Q15M  PRN IV DECREASED GLUCOSE;  Start 12/1/17 at 

23:00


Glucagon (Glucagen) 1 mg Q15M  PRN IM DECREASED GLUCOSE;  Start 12/1/17 at 23:00


Glucose (Glutose) 15 gm Q15M  PRN BUCCAL DECREASED GLUCOSE;  Start 12/1/17 at 23

:00


Fish Oil (Fish Oil) 1,000 mg BID PO ;  Start 12/2/17 at 09:00


Clindamycin HCl (Cleocin) 300 mg Q6 PO  Last administered on 12/2/17at 05:31; 

Admin Dose 300 MG;  Start 12/2/17 at 06:00;  Stop 12/12/17 at 05:59


Lactobacillus Acidophilus (Florajen3 Capsule) 1 each BID PO ;  Start 12/2/17 at 

09:00











MARCOS BOYLE Dec 2, 2017 06:24

## 2017-12-02 NOTE — RADRPT
PROCEDURE:   Renal US. 

 

CLINICAL INDICATION:   Acute renal failure 

 

TECHNIQUE:   Multiple sonographic images of the kidneys and bladder were obtained.  The images were 
reviewed on a PACS workstation. 

 

COMPARISON:   No prior studies are available for comparison. 

 

FINDINGS:

 

The right kidney measures 11.3 cm and the left kidney 12.1 cm in length. No renal mass, calculus or 
hydronephrosis seen bilaterally. There is mild to moderate enlargement of prostate with impression o
n the posterior inferior bladder. There is distension of the bladder.

 

IMPRESSION:

 

No renal abnormality seen.  Mild to moderate enlargement of prostate with impression on posterior in
ferior bladder. Bladder distension.

 

RPTAT: HJES

_____________________________________________ 

.Bolivar Do MD, MD           Date    Time 

Electronically viewed and signed by .Bolivar Do MD, MD on 12/02/2017 11:09 

 

D:  12/02/2017 11:09  T:  12/02/2017 11:09

.S/

## 2017-12-02 NOTE — RADRPT
Echocardiogram Report

 

Patient Name:  JERRY REARDON   Gender:       Male

MRN:           [id number]           Accession #:  [id number]

Birth Date:    1960       Study Date:   02-Dec-2017

Sonographer:   ISABEL               Location:     [id number]

 

Ref. Physician: SANTIAGO MADISON

Quality: Good

 

Procedures: Transthoracic echocardiogram with complete 2D, M-Mode, and 

doppler examination.

Indications: Congestive Heart Failure.

 

2D/M Mode                          Doppler

Measurement  Value  Normal Ranges  Measurement    Value  Normal Ranges

AoR Diam MM  3.3    cm             GÉNESIS Vmax       2.4    cm2

LA/Ao MM     1.2                   AV Mean Raimundo    1.1    m/sec

LA Dimen MM  4.0    cm             AV Mean PG     6.0    mmHg

LVIDd 2D     4.7    3.5 - 5.6 cm   AV Peak Raimundo    1.5    m/sec

LVIDs 2D     3.2    2.1 - 4.1 cm   AV Peak PG     9.0    mmHg

FS 2D        31.6   %              AV VTI         32.8   cm

LVPWd 2D     1.1    0.6 - 1.1 cm   LVOT Peak Raimundo  1.1    m/sec

IVSd 2D      1.1    0.6 - 1.1 cm   LVOT Peak PG   5.0    mmHg

IVS/LVPW 2D  1.0                   MV E Peak Raimundo  0.8    m/sec

EF 2D        60.0   50.0 - 65.0 %  MV A Peak Raimundo  1.2    m/sec

LVOT Diam    2.0    cm             MV E/A         0.7

LVOT Area    3.1    cm2            MV Decel Time  113    msec

MV E/A         0.7

MR Peak PG     89.0   mmHg

MR Peak Raimundo    4.7    m/sec

TR Peak Raimundo    3.0    m/sec

TR Peak PG     36.0   mmHg

RVSP           46.0   mmHg

RA Pressure    10.0

 

Findings

Left Ventricle: Normal left ventricular systolic function.  Normal left 

ventricular cavity size.  Normal left ventricular wall thickness.  

Ejection fraction is visually estimated at 60 %.  Tissue Doppler/Mitral 

Doppler indices are consistent with impaired relaxation (Stage I 

diastolic dysfunction).

Right Ventricle: Normal right ventricular size.  Normal right 

ventricular systolic function.

Left Atrium: The left atrium is normal in size however upper limit in 

measurement.  LA Dimension4.00 cm.

Right Atrium: The right atrium is normal in size.

Atrial Septum: Normal atrial septum.

Mitral Valve: Normal appearance of the mitral valve.  Mild mitral valve 

regurgitation.

Aortic Valve: Normal appearance of the aortic valve.  No significant 

aortic stenosis or insufficiency.

Tricuspid Valve: Normal appearance of the tricuspid valve.  Estimated 

peak PA systolic pressure 46 mmHg.  There is mild tricuspid 

regurgitation.

Pulmonic Valve: Normal pulmonic valve appearance.

Pericardium: Possible trivial pericardial effusion noted.

Aorta: Normal aortic root.

IVC: Normal size and normal respiratory collapse consistent with normal 

right atrial pressure.

 

Conclusions

1.Normal left ventricular systolic function.  Normal left ventricular 

cavity size.  Normal left ventricular wall thickness.  Ejection fraction 

is visually estimated at 60 %.  Tissue Doppler/Mitral Doppler indices 

are consistent with impaired relaxation (Stage I diastolic dysfunction).

2.Normal appearance of the mitral valve.  Mild mitral valve 

regurgitation.

3.Normal appearance of the aortic valve.  No significant aortic 

stenosis or insufficiency.

4.Normal appearance of the tricuspid valve.  Estimated peak PA systolic 

pressure 46 mmHg.  There is mild tricuspid regurgitation.

5.Possible trivial pericardial effusion noted.

 

Electronically Signed By:

Rene Sinclair

02-Dec-2017 13:46:44  -0800

 

Patient Name: JERRY REARDON

MRN: [id number]

Study Date: 02-Dec-2017

 

[id number]

## 2017-12-03 VITALS — HEART RATE: 72 BPM

## 2017-12-03 VITALS — HEART RATE: 78 BPM

## 2017-12-03 VITALS — SYSTOLIC BLOOD PRESSURE: 97 MMHG | DIASTOLIC BLOOD PRESSURE: 56 MMHG | RESPIRATION RATE: 19 BRPM

## 2017-12-03 VITALS — RESPIRATION RATE: 19 BRPM | DIASTOLIC BLOOD PRESSURE: 58 MMHG | SYSTOLIC BLOOD PRESSURE: 101 MMHG

## 2017-12-03 VITALS — DIASTOLIC BLOOD PRESSURE: 45 MMHG | SYSTOLIC BLOOD PRESSURE: 102 MMHG | RESPIRATION RATE: 19 BRPM

## 2017-12-03 VITALS — HEART RATE: 67 BPM

## 2017-12-03 VITALS — HEART RATE: 71 BPM

## 2017-12-03 VITALS — RESPIRATION RATE: 20 BRPM | DIASTOLIC BLOOD PRESSURE: 76 MMHG | SYSTOLIC BLOOD PRESSURE: 158 MMHG

## 2017-12-03 VITALS — SYSTOLIC BLOOD PRESSURE: 97 MMHG | DIASTOLIC BLOOD PRESSURE: 55 MMHG | RESPIRATION RATE: 20 BRPM

## 2017-12-03 LAB
ALBUMIN SERPL-MCNC: 2.5 G/DL (ref 3.3–4.9)
ALBUMIN/GLOB SERPL: 0.86 {RATIO}
ALP SERPL-CCNC: 86 IU/L (ref 42–121)
ALT SERPL-CCNC: 31 IU/L (ref 13–69)
AMYLASE SERPL-CCNC: 120 U/L (ref 11–123)
ANION GAP SERPL CALC-SCNC: 13 MMOL/L (ref 8–16)
AST SERPL-CCNC: 18 IU/L (ref 15–46)
BASOPHILS # BLD AUTO: 0 10^3/UL (ref 0–0.1)
BASOPHILS NFR BLD: 0.5 % (ref 0–2)
BILIRUB DIRECT SERPL-MCNC: 0 MG/DL (ref 0–0.2)
BILIRUB SERPL-MCNC: 0.5 MG/DL (ref 0.2–1.3)
BUN SERPL-MCNC: 49 MG/DL (ref 7–20)
CALCIUM SERPL-MCNC: 7.7 MG/DL (ref 8.4–10.2)
CHLORIDE SERPL-SCNC: 105 MMOL/L (ref 97–110)
CK MB SERPL-MCNC: 1.01 NG/ML (ref 0–2.4)
CK SERPL-CCNC: 115 IU/L (ref 23–200)
CO2 SERPL-SCNC: 21 MMOL/L (ref 21–31)
CREAT SERPL-MCNC: 4.52 MG/DL (ref 0.61–1.24)
EOSINOPHIL # BLD: 0.3 10^3/UL (ref 0–0.5)
EOSINOPHIL NFR BLD: 3.8 % (ref 0–7)
ERYTHROCYTE [DISTWIDTH] IN BLOOD BY AUTOMATED COUNT: 12.4 % (ref 11.5–14.5)
GLOBULIN SER-MCNC: 2.9 G/DL (ref 1.3–3.2)
GLUCOSE SERPL-MCNC: 116 MG/DL (ref 70–220)
HCT VFR BLD CALC: 25.2 % (ref 42–52)
HGB BLD-MCNC: 8.8 G/DL (ref 14–18)
LYMPHOCYTES # BLD AUTO: 3 10^3/UL (ref 0.8–2.9)
LYMPHOCYTES NFR BLD AUTO: 36.4 % (ref 15–51)
MAGNESIUM SERPL-MCNC: 1.7 MG/DL (ref 1.7–2.5)
MCH RBC QN AUTO: 30.4 PG (ref 29–33)
MCHC RBC AUTO-ENTMCNC: 34.9 G/DL (ref 32–37)
MCV RBC AUTO: 87.2 FL (ref 82–101)
MONOCYTES # BLD: 0.6 10^3/UL (ref 0.3–0.9)
MONOCYTES NFR BLD: 7 % (ref 0–11)
NEUTROPHILS # BLD: 4.2 10^3/UL (ref 1.6–7.5)
NEUTROPHILS NFR BLD AUTO: 52.1 % (ref 39–77)
NRBC # BLD MANUAL: 0 10^3/UL (ref 0–0)
NRBC BLD AUTO-RTO: 0 /100WBC (ref 0–0)
PHOSPHATE SERPL-MCNC: 5.2 MG/DL (ref 2.5–4.9)
PLATELET # BLD: 133 10^3/UL (ref 140–415)
PMV BLD AUTO: 11.7 FL (ref 7.4–10.4)
POTASSIUM SERPL-SCNC: 4.6 MMOL/L (ref 3.5–5.1)
PROT SERPL-MCNC: 5.4 G/DL (ref 6.1–8.1)
RBC # BLD AUTO: 2.89 10^6/UL (ref 4.7–6.1)
SODIUM SERPL-SCNC: 134 MMOL/L (ref 135–144)
TROPONIN I SERPL-MCNC: < 0.012 NG/ML (ref 0–0.12)
WBC # BLD AUTO: 8.2 10^3/UL (ref 4.8–10.8)

## 2017-12-03 RX ADMIN — CLINDAMYCIN HYDROCHLORIDE SCH MG: 300 CAPSULE ORAL at 00:08

## 2017-12-03 RX ADMIN — GABAPENTIN SCH MG: 300 CAPSULE ORAL at 20:19

## 2017-12-03 RX ADMIN — OMEGA-3 FATTY ACIDS CAP DELAYED RELEASE 1000 MG SCH MG: 1000 CAPSULE DELAYED RELEASE at 08:53

## 2017-12-03 RX ADMIN — HEPARIN SODIUM SCH UNIT: 5000 INJECTION, SOLUTION INTRAVENOUS; SUBCUTANEOUS at 06:19

## 2017-12-03 RX ADMIN — CLINDAMYCIN HYDROCHLORIDE SCH MG: 300 CAPSULE ORAL at 17:18

## 2017-12-03 RX ADMIN — SODIUM FERRIC GLUCONATE COMPLEX SCH MLS/HR: 12.5 INJECTION INTRAVENOUS at 16:41

## 2017-12-03 RX ADMIN — OMEGA-3 FATTY ACIDS CAP DELAYED RELEASE 1000 MG SCH MG: 1000 CAPSULE DELAYED RELEASE at 20:18

## 2017-12-03 RX ADMIN — Medication SCH EACH: at 20:18

## 2017-12-03 RX ADMIN — GABAPENTIN SCH MG: 300 CAPSULE ORAL at 08:52

## 2017-12-03 RX ADMIN — TERAZOSIN HYDROCHLORIDE ANHYDROUS SCH MG: 1 CAPSULE ORAL at 20:18

## 2017-12-03 RX ADMIN — CLINDAMYCIN HYDROCHLORIDE SCH MG: 300 CAPSULE ORAL at 06:17

## 2017-12-03 RX ADMIN — GABAPENTIN SCH MG: 300 CAPSULE ORAL at 12:36

## 2017-12-03 RX ADMIN — NIFEDIPINE SCH MG: 60 TABLET, FILM COATED, EXTENDED RELEASE ORAL at 08:54

## 2017-12-03 RX ADMIN — LEVOTHYROXINE SODIUM SCH MCG: 75 TABLET ORAL at 06:17

## 2017-12-03 RX ADMIN — HEPARIN SODIUM SCH UNIT: 5000 INJECTION, SOLUTION INTRAVENOUS; SUBCUTANEOUS at 21:24

## 2017-12-03 RX ADMIN — FOLIC ACID SCH MLS/HR: 5 INJECTION, SOLUTION INTRAMUSCULAR; INTRAVENOUS; SUBCUTANEOUS at 13:55

## 2017-12-03 RX ADMIN — TAMSULOSIN HYDROCHLORIDE SCH MG: 0.4 CAPSULE ORAL at 08:53

## 2017-12-03 RX ADMIN — Medication SCH EACH: at 08:52

## 2017-12-03 RX ADMIN — CLINDAMYCIN HYDROCHLORIDE SCH MG: 300 CAPSULE ORAL at 12:36

## 2017-12-03 RX ADMIN — ATORVASTATIN CALCIUM SCH MG: 40 TABLET, FILM COATED ORAL at 20:19

## 2017-12-03 RX ADMIN — HEPARIN SODIUM SCH UNIT: 5000 INJECTION, SOLUTION INTRAVENOUS; SUBCUTANEOUS at 13:57

## 2017-12-03 NOTE — CONS
Date/Time of Note


Date/Time of Note


DATE: 12/3/17 


TIME: 20:25





Assessment/Plan


Assessment/Plan


Chief Complaint/Hosp Course


57 M with PMHx of HTN, HL, DM, pt is not sure about having CKD- he presented 

with  Left lower extremity rash, lower extremely swollen- pt is noted to have 

BUN/Cr 38/3.49. pt denies h/o CKD, seeing any nephrologist outside.


currently c/o leg edema and leg pain, Cr still high, pt denies any voiding 

complaints but he is noted to have 1000ml on bladder Scan just done by us.


Problems:  


Additional Assessment/Plan


1. Non Oliguric Acute kidney injury vs acute kidney injury on CKD 


2. BPH with urinary retention s/p Schroeder catheter placement 


4. H/o possible CKD due to DM nephropathy, pt is not aware about it or has not 

seen any nephrologist as outpatient, I doubt it 


5. DM II


6. HTN


7. Anemia combinatino of iron deficiency anemia and anemia of chronic renal 

disease 


8. LE significant edmea, US negative for DVT, ECHO showed EF 60% with stage I 

diastoilc dysfunction 


9. Accelerated HTN





Plan:


Renal US showed normal size kidneys and normal echogenicity- it showed  

moderately enarlaged prostate causing possible obstructive nephropathy


BUN/Cr still remains elevated, pt s/p schroeder catheter placement, good urine 

output 


IV iron 100 mg daily X 5 days


Bp runs low today AM, will decrease Procardia Xl to 30mg po daily and use IV 

hydralazine prn SBP> 150 mm Hg 


we will continue to follow





Consultation Date/Type/Reason


Admit Date/Time


Dec 1, 2017 at 22:22


Initial Consult Date


12/2/17


Type of Consultation:  NEPHROLOGY


Referring Provider:  MARCOS BOYLE





24 HR Interval Summary


Free Text/Dictation


s/p Schroeder catheter, c/o suprapubic pain, BUN/Cr still remains elevated





Exam/Review of Systems


Vital Signs


Vitals





 Vital Signs








  Date Time  Temp Pulse Resp B/P Pulse Ox O2 Delivery O2 Flow Rate FiO2


 


12/3/17 20:05  78      


 


12/3/17 19:45 97.9  20 158/76 97   


 


12/1/17 22:43      Room Air  














 Intake and Output   


 


 12/2/17 12/2/17 12/3/17





 15:00 23:00 07:00


 


Intake Total 250 ml 1010 ml 120 ml


 


Output Total 1050 ml 575 ml 250 ml


 


Balance -800 ml 435 ml -130 ml











Exam


Constitutional:  alert


Respiratory:  clear to auscultation, diminished breath sounds, normal air 

movement


Cardiovascular:  nl pulses, regular rate and rhythm


Gastrointestinal:  nl liver, spleen, non-tender, soft


Musculoskeletal:  nl extremities to inspection


Extremities:  normal pulses


Neurological:  CNS II-XII intact, nl mental status, nl speech, nl strength





Results


Result Diagram:  


12/3/17 0543                                                                   

             12/3/17 0544





Results 24 hrs





Laboratory Tests








Test


  12/2/17


20:37 12/3/17


02:10 12/3/17


05:43 12/3/17


05:44


 


Bedside Glucose 265  H 148    


 


White Blood Count   8.2   


 


Red Blood Count   2.89  L 


 


Hemoglobin   8.8  L 


 


Hematocrit   25.2  L 


 


Mean Corpuscular Volume   87.2   


 


Mean Corpuscular Hemoglobin   30.4   


 


Mean Corpuscular Hemoglobin


Concent 


  


  34.9  


  


 


 


Red Cell Distribution Width   12.4   


 


Platelet Count   133  L 


 


Mean Platelet Volume   11.7  H 


 


Neutrophils %   52.1   


 


Lymphocytes %   36.4   


 


Monocytes %   7.0   


 


Eosinophils %   3.8   


 


Basophils %   0.5   


 


Nucleated Red Blood Cells %   0.0   


 


Neutrophils #   4.2   


 


Lymphocytes #   3.0  H 


 


Monocytes #   0.6   


 


Eosinophils #   0.3   


 


Basophils #   0.0   


 


Nucleated Red Blood Cells #   0.0   


 


Phosphorus Level   5.2  H 


 


Magnesium Level   1.7   


 


Creatine Kinase   115   


 


Creatine Kinase Index   0.9   


 


Creatinine Kinase MB (Mass)   1.01   


 


Troponin I   < 0.012   


 


Sodium Level    134  L


 


Potassium Level    4.6  


 


Chloride Level    105  


 


Carbon Dioxide Level    21  


 


Anion Gap    13  


 


Blood Urea Nitrogen    49  #H


 


Creatinine    4.52  #H


 


Glucose Level    116  #


 


Calcium Level    7.7  L


 


Total Bilirubin    0.5  


 


Direct Bilirubin    0.00  


 


Indirect Bilirubin    0.5  


 


Aspartate Amino Transf


(AST/SGOT) 


  


  


  18  


 


 


Alanine Aminotransferase


(ALT/SGPT) 


  


  


  31  


 


 


Alkaline Phosphatase    86  


 


Total Protein    5.4  L


 


Albumin    2.5  L


 


Globulin    2.90  


 


Albumin/Globulin Ratio    0.86  


 


Amylase Level    120  


 


Lipase    237  














Test


  12/3/17


08:20 12/3/17


12:30 12/3/17


17:08 


 


 


Bedside Glucose 131   217   153   











Medications


Medications





 Current Medications


Ondansetron HCl (Zofran Tab) 4 mg Q6H  PRN PO NAUSEA AND/OR VOMITING;  Start 12/ 1/17 at 23:00


Acetaminophen (Tylenol Tab) 650 mg Q6H  PRN PO PAIN LEVEL 1-3 OR FEVER;  Start 

12/1/17 at 23:00


Acetaminophen/ Hydrocodone Bitart (Norco (5/325)) 1 tab Q6H  PRN PO PAIN LEVEL 4

-6;  Start 12/1/17 at 23:00


Heparin Sodium (Porcine) (Heparin  (5000 Units/0.5 ml)) 5,000 unit Q8 SC  Last 

administered on 12/3/17at 13:57; Admin Dose 5,000 UNIT;  Start 12/1/17 at 23:00


Atorvastatin Calcium (Lipitor) 40 mg QHS PO  Last administered on 12/2/17at 20:

45; Admin Dose 40 MG;  Start 12/1/17 at 23:00


Carvedilol (Coreg) 12.5 mg BID PO  Last administered on 12/3/17at 08:52; Admin 

Dose 12.5 MG;  Start 12/1/17 at 23:00


Gabapentin (Neurontin) 600 mg TID PO  Last administered on 12/3/17at 12:36; 

Admin Dose 600 MG;  Start 12/2/17 at 09:00


Hydralazine HCl (Apresoline) 10 mg Q4H  PRN IV ELEVATED SYSTOLIC BP Last 

administered on 12/2/17at 08:41; Admin Dose 10 MG;  Start 12/1/17 at 23:00


Diagnostic Test (Pha) (Accu-Chek) 1 ea 02 XX  Last administered on 12/3/17at 02:

49; Admin Dose 1 EA;  Start 12/2/17 at 02:00


Miscellaneous Information 1 ea NOTE XX ;  Start 12/1/17 at 23:00


Glucose (Glutose) 15 gm Q15M  PRN PO DECREASED GLUCOSE;  Start 12/1/17 at 23:00


Glucose (Glutose) 22.5 gm Q15M  PRN PO DECREASED GLUCOSE;  Start 12/1/17 at 23:

00


Dextrose (D50w Syringe) 25 ml Q15M  PRN IV DECREASED GLUCOSE;  Start 12/1/17 at 

23:00


Dextrose (D50w Syringe) 50 ml Q15M  PRN IV DECREASED GLUCOSE;  Start 12/1/17 at 

23:00


Glucagon (Glucagen) 1 mg Q15M  PRN IM DECREASED GLUCOSE;  Start 12/1/17 at 23:00


Glucose (Glutose) 15 gm Q15M  PRN BUCCAL DECREASED GLUCOSE;  Start 12/1/17 at 23

:00


Fish Oil (Fish Oil) 1,000 mg BID PO  Last administered on 12/3/17at 08:53; 

Admin Dose 1,000 MG;  Start 12/2/17 at 09:00


Clindamycin HCl (Cleocin) 300 mg Q6 PO  Last administered on 12/3/17at 17:18; 

Admin Dose 300 MG;  Start 12/2/17 at 06:00;  Stop 12/12/17 at 05:59


Lactobacillus Acidophilus (Florajen3 Capsule) 1 each BID PO  Last administered 

on 12/3/17at 08:52; Admin Dose 1 EACH;  Start 12/2/17 at 09:00


Tamsulosin HCl (Flomax) 0.4 mg DAILY PO  Last administered on 12/3/17at 08:53; 

Admin Dose 0.4 MG;  Start 12/3/17 at 09:00


Terazosin HCl 1 mg 1 mg HS PO  Last administered on 12/2/17at 21:48; Admin Dose 

1 MG;  Start 12/2/17 at 21:00


Ferric Sodium Gluconate Complex/ Sodium Chloride (Ferrlecit/NS) 110 ml @  100 

mls/hr Q24H IVPB  Last administered on 12/3/17at 16:41; Admin Dose 100 MLS/HR;  

Start 12/2/17 at 16:00;  Stop 12/4/17 at 17:05


Nifedipine 30 mg 30 mg DAILY PO ;  Start 12/4/17 at 09:00


Sodium Chloride (NS) 1,000 ml @  80 mls/hr [id number] IV  Last administered on 12/3

/17at 13:55; Admin Dose 80 MLS/HR;  Start 12/3/17 at 13:00











SANTIAGO MADISON MD Dec 3, 2017 20:29

## 2017-12-03 NOTE — PN
Date/Time of Note


Date/Time of Note


DATE: 12/3/17 


TIME: 11:51





Assessment/Plan


VTE Prophylaxis


VTE Prophylaxis Intervention:  heparin





Lines/Catheters


IV Catheter Type (from [facility]):  Saline Lock


Urinary Cath still in place:  Yes


Reason Cath still needed:  urinary retention





Assessment/Plan


Chief Complaint/Hosp Course


1.  Acute nonoliguric kidney injury.  


   -Unknown baseline creatinine.  


   -Possible CKD.


   -Nephrology follow-up.





2.  Left lower extremity cellulitis.  


   -On antibiotics.


   -ID following.





3.  Benign prostatic hypertrophy.


   -Continue Flomax and Hytrin.





4.  Normocytic, normochromic anemia.


   -Underlying iron deficiency.


   -Continue iron supplements.





5.  Hypertension.  


   -Continue antihypertensives.


   


6.  Diabetes mellitus type 2.


   -Hold metformin.


   -Sliding scale insulin.


   -Hemoglobin A1c 6.3.





7.  Hypothyroidism.  


   -Continue Synthroid.





8.  Dyslipidemia with hypertriglyceridemia.


   -Continue fish oil.


   -Continue statins.





9.  Diabetic neuropathy.


   -Continue gabapentin.





10.  Fluids, electrolytes, and nutrition.


   -Carb controlled, low-cholesterol diet.





11.  DVT prophylaxis.


   -SQ heparin.





12.  Plan.


   -Continue antimicrobials as per ID.


   -Follow nephrology recommendations for JOSE MANUEL.





Patient was seen in collaboration with Dr. Beck.





Plan of care was explained to the patient.


Problems:  





Subjective


24 Hr Interval Summary


Free Text/Dictation


Denies any complaints.





Exam/Review of Systems


Vital Signs


Vitals





 Vital Signs








  Date Time  Temp Pulse Resp B/P Pulse Ox O2 Delivery O2 Flow Rate FiO2


 


12/3/17 08:14 98.3 75 20 97/55 97   


 


12/1/17 22:43      Room Air  














 Intake and Output   


 


 12/2/17 12/2/17 12/3/17





 15:00 23:00 07:00


 


Intake Total 250 ml 1010 ml 120 ml


 


Output Total 1050 ml 575 ml 250 ml


 


Balance -800 ml 435 ml -130 ml











Exam


General: Adequately build 57 year-old male lying in bed in no apparent distress.


HEENT: Normocephalic, atraumatic. Eyes: Anicteric sclerae, conjunctivae clear. 

ENT: Nasal septum midline, oral mucosa moist. Neck supple, no JVD noticed.


Respiratory: Bilaterally clear breath sounds. No use of accessory muscles of 

respiration. No adventitious breath sounds.


Cardiovascular: S1, S2 heard.  Regular rate and rhythm.


Abdomen: Soft, nontender, and nondistended. Bowel sounds positive in all 4 

quadrants.


Genitourinary: Deferred.


Extremities: No cyanosis, no clubbing.  Left pedal edema.  Left leg erythema on 

the distal aspect posteriorly with no tenderness to touch.


Neurologic: Cranial nerves II through XII grossly intact. The patient is awake, 

alert, and oriented.





Results


Result Diagram:  


12/3/17 0543                                                                   

             12/3/17 0544





Results 24 hrs





Laboratory Tests








Test


  12/2/17


12:47 12/2/17


17:26 12/2/17


20:37 12/3/17


02:10


 


Bedside Glucose 189   181   265  H 148  














Test


  12/3/17


05:43 12/3/17


05:44 12/3/17


08:20 


 


 


White Blood Count 8.2     


 


Red Blood Count 2.89  L   


 


Hemoglobin 8.8  L   


 


Hematocrit 25.2  L   


 


Mean Corpuscular Volume 87.2     


 


Mean Corpuscular Hemoglobin 30.4     


 


Mean Corpuscular Hemoglobin


Concent 34.9  


  


  


  


 


 


Red Cell Distribution Width 12.4     


 


Platelet Count 133  L   


 


Mean Platelet Volume 11.7  H   


 


Neutrophils % 52.1     


 


Lymphocytes % 36.4     


 


Monocytes % 7.0     


 


Eosinophils % 3.8     


 


Basophils % 0.5     


 


Nucleated Red Blood Cells % 0.0     


 


Neutrophils # 4.2     


 


Lymphocytes # 3.0  H   


 


Monocytes # 0.6     


 


Eosinophils # 0.3     


 


Basophils # 0.0     


 


Nucleated Red Blood Cells # 0.0     


 


Phosphorus Level 5.2  H   


 


Magnesium Level 1.7     


 


Creatine Kinase 115     


 


Creatine Kinase Index 0.9     


 


Creatinine Kinase MB (Mass) 1.01     


 


Troponin I < 0.012     


 


Sodium Level  134  L  


 


Potassium Level  4.6    


 


Chloride Level  105    


 


Carbon Dioxide Level  21    


 


Anion Gap  13    


 


Blood Urea Nitrogen  49  #H  


 


Creatinine  4.52  #H  


 


Glucose Level  116  #  


 


Calcium Level  7.7  L  


 


Total Bilirubin  0.5    


 


Direct Bilirubin  0.00    


 


Indirect Bilirubin  0.5    


 


Aspartate Amino Transf


(AST/SGOT) 


  18  


  


  


 


 


Alanine Aminotransferase


(ALT/SGPT) 


  31  


  


  


 


 


Alkaline Phosphatase  86    


 


Total Protein  5.4  L  


 


Albumin  2.5  L  


 


Globulin  2.90    


 


Albumin/Globulin Ratio  0.86    


 


Amylase Level  120    


 


Lipase  237    


 


Bedside Glucose   131   











Medications


Medications





 Current Medications


Ondansetron HCl (Zofran Tab) 4 mg Q6H  PRN PO NAUSEA AND/OR VOMITING;  Start 12/ 1/17 at 23:00


Acetaminophen (Tylenol Tab) 650 mg Q6H  PRN PO PAIN LEVEL 1-3 OR FEVER;  Start 

12/1/17 at 23:00


Acetaminophen/ Hydrocodone Bitart (Norco (5/325)) 1 tab Q6H  PRN PO PAIN LEVEL 4

-6;  Start 12/1/17 at 23:00


Heparin Sodium (Porcine) (Heparin  (5000 Units/0.5 ml)) 5,000 unit Q8 SC  Last 

administered on 12/3/17at 06:19; Admin Dose 5,000 UNIT;  Start 12/1/17 at 23:00


Atorvastatin Calcium (Lipitor) 40 mg QHS PO  Last administered on 12/2/17at 20:

45; Admin Dose 40 MG;  Start 12/1/17 at 23:00


Carvedilol (Coreg) 12.5 mg BID PO  Last administered on 12/3/17at 08:52; Admin 

Dose 12.5 MG;  Start 12/1/17 at 23:00


Gabapentin (Neurontin) 600 mg TID PO  Last administered on 12/3/17at 08:52; 

Admin Dose 600 MG;  Start 12/2/17 at 09:00


Hydralazine HCl (Apresoline) 10 mg Q4H  PRN IV ELEVATED SYSTOLIC BP Last 

administered on 12/2/17at 08:41; Admin Dose 10 MG;  Start 12/1/17 at 23:00


Diagnostic Test (Pha) (Accu-Chek) 1 ea 02 XX  Last administered on 12/3/17at 02:

49; Admin Dose 1 EA;  Start 12/2/17 at 02:00


Miscellaneous Information 1 ea NOTE XX ;  Start 12/1/17 at 23:00


Glucose (Glutose) 15 gm Q15M  PRN PO DECREASED GLUCOSE;  Start 12/1/17 at 23:00


Glucose (Glutose) 22.5 gm Q15M  PRN PO DECREASED GLUCOSE;  Start 12/1/17 at 23:

00


Dextrose (D50w Syringe) 25 ml Q15M  PRN IV DECREASED GLUCOSE;  Start 12/1/17 at 

23:00


Dextrose (D50w Syringe) 50 ml Q15M  PRN IV DECREASED GLUCOSE;  Start 12/1/17 at 

23:00


Glucagon (Glucagen) 1 mg Q15M  PRN IM DECREASED GLUCOSE;  Start 12/1/17 at 23:00


Glucose (Glutose) 15 gm Q15M  PRN BUCCAL DECREASED GLUCOSE;  Start 12/1/17 at 23

:00


Fish Oil (Fish Oil) 1,000 mg BID PO  Last administered on 12/3/17at 08:53; 

Admin Dose 1,000 MG;  Start 12/2/17 at 09:00


Clindamycin HCl (Cleocin) 300 mg Q6 PO  Last administered on 12/3/17at 06:17; 

Admin Dose 300 MG;  Start 12/2/17 at 06:00;  Stop 12/12/17 at 05:59


Lactobacillus Acidophilus (Florajen3 Capsule) 1 each BID PO  Last administered 

on 12/3/17at 08:52; Admin Dose 1 EACH;  Start 12/2/17 at 09:00


Tamsulosin HCl (Flomax) 0.4 mg DAILY PO  Last administered on 12/3/17at 08:53; 

Admin Dose 0.4 MG;  Start 12/3/17 at 09:00


Nifedipine (Procardia Xl) 60 mg BID PO  Last administered on 12/2/17at 12:42; 

Admin Dose 60 MG;  Start 12/2/17 at 12:00


Terazosin HCl 1 mg 1 mg HS PO  Last administered on 12/2/17at 21:48; Admin Dose 

1 MG;  Start 12/2/17 at 21:00


Ferric Sodium Gluconate Complex/ Sodium Chloride (Ferrlecit/NS) 110 ml @  100 

mls/hr Q24H IVPB  Last administered on 12/2/17at 16:07; Admin Dose 100 MLS/HR;  

Start 12/2/17 at 16:00;  Stop 12/4/17 at 17:05











SELMA DELGADO NP Dec 3, 2017 11:53

## 2017-12-04 VITALS — HEART RATE: 72 BPM

## 2017-12-04 VITALS — RESPIRATION RATE: 18 BRPM | DIASTOLIC BLOOD PRESSURE: 97 MMHG | SYSTOLIC BLOOD PRESSURE: 159 MMHG

## 2017-12-04 VITALS — SYSTOLIC BLOOD PRESSURE: 134 MMHG | RESPIRATION RATE: 18 BRPM | DIASTOLIC BLOOD PRESSURE: 76 MMHG

## 2017-12-04 VITALS — RESPIRATION RATE: 18 BRPM | SYSTOLIC BLOOD PRESSURE: 157 MMHG | DIASTOLIC BLOOD PRESSURE: 78 MMHG

## 2017-12-04 VITALS — SYSTOLIC BLOOD PRESSURE: 152 MMHG | DIASTOLIC BLOOD PRESSURE: 75 MMHG | RESPIRATION RATE: 16 BRPM

## 2017-12-04 VITALS — HEART RATE: 69 BPM

## 2017-12-04 VITALS — HEART RATE: 79 BPM

## 2017-12-04 VITALS — DIASTOLIC BLOOD PRESSURE: 77 MMHG | RESPIRATION RATE: 19 BRPM | SYSTOLIC BLOOD PRESSURE: 154 MMHG

## 2017-12-04 VITALS — HEART RATE: 73 BPM

## 2017-12-04 VITALS — HEART RATE: 70 BPM

## 2017-12-04 VITALS — DIASTOLIC BLOOD PRESSURE: 76 MMHG | RESPIRATION RATE: 19 BRPM | SYSTOLIC BLOOD PRESSURE: 140 MMHG

## 2017-12-04 VITALS — HEART RATE: 77 BPM

## 2017-12-04 LAB
ANION GAP SERPL CALC-SCNC: 12 MMOL/L (ref 8–16)
BASOPHILS # BLD AUTO: 0 10^3/UL (ref 0–0.1)
BASOPHILS NFR BLD: 0.5 % (ref 0–2)
BUN SERPL-MCNC: 44 MG/DL (ref 7–20)
CALCIUM SERPL-MCNC: 8.3 MG/DL (ref 8.4–10.2)
CHLORIDE SERPL-SCNC: 101 MMOL/L (ref 97–110)
CO2 SERPL-SCNC: 23 MMOL/L (ref 21–31)
CREAT SERPL-MCNC: 3.87 MG/DL (ref 0.61–1.24)
EOSINOPHIL # BLD: 0.4 10^3/UL (ref 0–0.5)
EOSINOPHIL NFR BLD: 4.3 % (ref 0–7)
ERYTHROCYTE [DISTWIDTH] IN BLOOD BY AUTOMATED COUNT: 12.2 % (ref 11.5–14.5)
GLUCOSE SERPL-MCNC: 136 MG/DL (ref 70–220)
HCT VFR BLD CALC: 26.4 % (ref 42–52)
HGB BLD-MCNC: 9.6 G/DL (ref 14–18)
LYMPHOCYTES # BLD AUTO: 1.9 10^3/UL (ref 0.8–2.9)
LYMPHOCYTES NFR BLD AUTO: 22.1 % (ref 15–51)
MAGNESIUM SERPL-MCNC: 1.6 MG/DL (ref 1.7–2.5)
MCH RBC QN AUTO: 30.8 PG (ref 29–33)
MCHC RBC AUTO-ENTMCNC: 36.4 G/DL (ref 32–37)
MCV RBC AUTO: 84.6 FL (ref 82–101)
MICROALBUMIN UR-MCNC: 205.8 MG/DL
MONOCYTES # BLD: 0.7 10^3/UL (ref 0.3–0.9)
MONOCYTES NFR BLD: 8.1 % (ref 0–11)
NEUTROPHILS # BLD: 5.4 10^3/UL (ref 1.6–7.5)
NEUTROPHILS NFR BLD AUTO: 64.8 % (ref 39–77)
NRBC # BLD MANUAL: 0 10^3/UL (ref 0–0)
NRBC BLD AUTO-RTO: 0 /100WBC (ref 0–0)
PHOSPHATE SERPL-MCNC: 4.4 MG/DL (ref 2.5–4.9)
PLATELET # BLD: 138 10^3/UL (ref 140–415)
PMV BLD AUTO: 11.9 FL (ref 7.4–10.4)
POTASSIUM SERPL-SCNC: 5.2 MMOL/L (ref 3.5–5.1)
RBC # BLD AUTO: 3.12 10^6/UL (ref 4.7–6.1)
SODIUM SERPL-SCNC: 131 MMOL/L (ref 135–144)
WBC # BLD AUTO: 8.4 10^3/UL (ref 4.8–10.8)

## 2017-12-04 RX ADMIN — CLINDAMYCIN HYDROCHLORIDE SCH MG: 300 CAPSULE ORAL at 17:14

## 2017-12-04 RX ADMIN — HEPARIN SODIUM SCH UNIT: 5000 INJECTION, SOLUTION INTRAVENOUS; SUBCUTANEOUS at 21:04

## 2017-12-04 RX ADMIN — Medication SCH EACH: at 08:42

## 2017-12-04 RX ADMIN — GABAPENTIN SCH MG: 300 CAPSULE ORAL at 20:48

## 2017-12-04 RX ADMIN — CLINDAMYCIN HYDROCHLORIDE SCH MG: 300 CAPSULE ORAL at 06:04

## 2017-12-04 RX ADMIN — HEPARIN SODIUM SCH UNIT: 5000 INJECTION, SOLUTION INTRAVENOUS; SUBCUTANEOUS at 06:07

## 2017-12-04 RX ADMIN — GABAPENTIN SCH MG: 300 CAPSULE ORAL at 12:23

## 2017-12-04 RX ADMIN — OMEGA-3 FATTY ACIDS CAP DELAYED RELEASE 1000 MG SCH MG: 1000 CAPSULE DELAYED RELEASE at 08:43

## 2017-12-04 RX ADMIN — OMEGA-3 FATTY ACIDS CAP DELAYED RELEASE 1000 MG SCH MG: 1000 CAPSULE DELAYED RELEASE at 20:46

## 2017-12-04 RX ADMIN — ATORVASTATIN CALCIUM SCH MG: 40 TABLET, FILM COATED ORAL at 20:47

## 2017-12-04 RX ADMIN — CLINDAMYCIN HYDROCHLORIDE SCH MG: 300 CAPSULE ORAL at 12:23

## 2017-12-04 RX ADMIN — TAMSULOSIN HYDROCHLORIDE SCH MG: 0.4 CAPSULE ORAL at 08:43

## 2017-12-04 RX ADMIN — NIFEDIPINE SCH MG: 60 TABLET, FILM COATED, EXTENDED RELEASE ORAL at 08:42

## 2017-12-04 RX ADMIN — CLINDAMYCIN HYDROCHLORIDE SCH MG: 300 CAPSULE ORAL at 23:49

## 2017-12-04 RX ADMIN — HEPARIN SODIUM SCH UNIT: 5000 INJECTION, SOLUTION INTRAVENOUS; SUBCUTANEOUS at 13:26

## 2017-12-04 RX ADMIN — TERAZOSIN HYDROCHLORIDE ANHYDROUS SCH MG: 1 CAPSULE ORAL at 20:47

## 2017-12-04 RX ADMIN — SODIUM FERRIC GLUCONATE COMPLEX SCH MLS/HR: 12.5 INJECTION INTRAVENOUS at 15:33

## 2017-12-04 RX ADMIN — LEVOTHYROXINE SODIUM SCH MCG: 75 TABLET ORAL at 06:04

## 2017-12-04 RX ADMIN — Medication SCH EACH: at 20:47

## 2017-12-04 RX ADMIN — FOLIC ACID SCH MLS/HR: 5 INJECTION, SOLUTION INTRAMUSCULAR; INTRAVENOUS; SUBCUTANEOUS at 01:51

## 2017-12-04 RX ADMIN — CLINDAMYCIN HYDROCHLORIDE SCH MG: 300 CAPSULE ORAL at 00:04

## 2017-12-04 RX ADMIN — GABAPENTIN SCH MG: 300 CAPSULE ORAL at 08:43

## 2017-12-04 NOTE — CONS
Date/Time of Note


Date/Time of Note


DATE: 12/4/17 


TIME: 21:05





Assessment/Plan


Assessment/Plan


Chief Complaint/Hosp Course


57 M with PMHx of HTN, HL, DM, pt is not sure about having CKD- he presented 

with  Left lower extremity rash, lower extremely swollen- pt is noted to have 

BUN/Cr 38/3.49. pt denies h/o CKD, seeing any nephrologist outside.


currently c/o leg edema and leg pain, Cr still high, pt denies any voiding 

complaints but he is noted to have 1000ml on bladder Scan just done by us.


Problems:  


Additional Assessment/Plan


1. Non Oliguric Acute kidney injury vs acute kidney injury on CKD 


2. BPH with urinary retention s/p Schroeder catheter placement 


4. H/o possible CKD due to DM nephropathy, pt is not aware about it or has not 

seen any nephrologist as outpatient, I doubt it 


5. DM II


6. HTN


7. Anemia combinatino of iron deficiency anemia and anemia of chronic renal 

disease 


8. LE significant edmea, US negative for DVT, ECHO showed EF 60% with stage I 

diastoilc dysfunction 


9. Accelerated HTN





Plan:


Renal US showed normal size kidneys and normal echogenicity- it showed  

moderately enarlaged prostate causing possible obstructive nephropathy


BUN/Cr still remains elevated, pt s/p schroeder catheter placement, good urine 

output 


IV iron 100 mg daily X 5 days


Bp stable with  Procardia Xl to 30mg po daily and use IV hydralazine prn SBP> 

150 mm Hg 


we will continue to follow





Consultation Date/Type/Reason


Admit Date/Time


Dec 1, 2017 at 22:22


Initial Consult Date


12/2/17


Type of Consultation:  NEPHROLOGY


Referring Provider:  MARCOS BOYLE





24 HR Interval Summary


Free Text/Dictation


K 5.3,kayexalate given, Afebrile, ID following , BUN/Cr remains elevated





Exam/Review of Systems


Vital Signs


Vitals





 Vital Signs








  Date Time  Temp Pulse Resp B/P Pulse Ox O2 Delivery O2 Flow Rate FiO2


 


12/4/17 20:04  72      


 


12/4/17 16:32 98.0  18 159/97 97   


 


12/1/17 22:43      Room Air  














 Intake and Output   


 


 12/3/17 12/3/17 12/4/17





 15:00 23:00 07:00


 


Intake Total  3310 ml 560 ml


 


Output Total  950 ml 


 


Balance  2360 ml 560 ml











Exam


Constitutional:  alert


Neck:  non-tender, supple


Respiratory:  clear to auscultation, diminished breath sounds, normal air 

movement


Cardiovascular:  nl pulses, regular rate and rhythm


Gastrointestinal:  nl liver, spleen, non-tender, soft


Musculoskeletal:  nl extremities to inspection


Extremities:  normal pulses


Neurological:  CNS II-XII intact, nl mental status, nl speech, nl strength





Results


Result Diagram:  


12/4/17 0703                                                                   

             12/4/17 0703





Results 24 hrs





Laboratory Tests








Test


  12/4/17


07:03 12/4/17


08:39 12/4/17


12:19 12/4/17


17:13


 


White Blood Count 8.4     


 


Red Blood Count 3.12  L   


 


Hemoglobin 9.6  L   


 


Hematocrit 26.4  L   


 


Mean Corpuscular Volume 84.6     


 


Mean Corpuscular Hemoglobin 30.8     


 


Mean Corpuscular Hemoglobin


Concent 36.4  


  


  


  


 


 


Red Cell Distribution Width 12.2     


 


Platelet Count 138  L   


 


Mean Platelet Volume 11.9  H   


 


Neutrophils % 64.8     


 


Lymphocytes % 22.1     


 


Monocytes % 8.1     


 


Eosinophils % 4.3     


 


Basophils % 0.5     


 


Nucleated Red Blood Cells % 0.0     


 


Neutrophils # 5.4     


 


Lymphocytes # 1.9     


 


Monocytes # 0.7     


 


Eosinophils # 0.4     


 


Basophils # 0.0     


 


Nucleated Red Blood Cells # 0.0     


 


Sodium Level 131  L   


 


Potassium Level 5.2  H   


 


Chloride Level 101     


 


Carbon Dioxide Level 23     


 


Anion Gap 12     


 


Blood Urea Nitrogen 44  H   


 


Creatinine 3.87  H   


 


Glucose Level 136     


 


Calcium Level 8.3  L   


 


Phosphorus Level 4.4     


 


Magnesium Level 1.6  L   


 


Bedside Glucose  149   273  H 143  














Test


  12/4/17


20:44 


  


  


 


 


Bedside Glucose 159     











Medications


Medications





 Current Medications


Ondansetron HCl (Zofran Tab) 4 mg Q6H  PRN PO NAUSEA AND/OR VOMITING;  Start 12/ 1/17 at 23:00


Acetaminophen (Tylenol Tab) 650 mg Q6H  PRN PO PAIN LEVEL 1-3 OR FEVER;  Start 

12/1/17 at 23:00


Acetaminophen/ Hydrocodone Bitart (Norco (5/325)) 1 tab Q6H  PRN PO PAIN LEVEL 4

-6;  Start 12/1/17 at 23:00


Heparin Sodium (Porcine) (Heparin  (5000 Units/0.5 ml)) 5,000 unit Q8 SC  Last 

administered on 12/4/17at 13:26; Admin Dose 5,000 UNIT;  Start 12/1/17 at 23:00


Atorvastatin Calcium (Lipitor) 40 mg QHS PO  Last administered on 12/4/17at 20:

47; Admin Dose 40 MG;  Start 12/1/17 at 23:00


Carvedilol (Coreg) 12.5 mg BID PO  Last administered on 12/4/17at 20:47; Admin 

Dose 12.5 MG;  Start 12/1/17 at 23:00


Gabapentin (Neurontin) 600 mg TID PO  Last administered on 12/4/17at 20:48; 

Admin Dose 600 MG;  Start 12/2/17 at 09:00


Hydralazine HCl (Apresoline) 10 mg Q4H  PRN IV ELEVATED SYSTOLIC BP Last 

administered on 12/2/17at 08:41; Admin Dose 10 MG;  Start 12/1/17 at 23:00


Diagnostic Test (Pha) (Accu-Chek) 1 ea 02 XX  Last administered on 12/3/17at 02:

49; Admin Dose 1 EA;  Start 12/2/17 at 02:00


Miscellaneous Information 1 ea NOTE XX ;  Start 12/1/17 at 23:00


Glucose (Glutose) 15 gm Q15M  PRN PO DECREASED GLUCOSE;  Start 12/1/17 at 23:00


Glucose (Glutose) 22.5 gm Q15M  PRN PO DECREASED GLUCOSE;  Start 12/1/17 at 23:

00


Dextrose (D50w Syringe) 25 ml Q15M  PRN IV DECREASED GLUCOSE;  Start 12/1/17 at 

23:00


Dextrose (D50w Syringe) 50 ml Q15M  PRN IV DECREASED GLUCOSE;  Start 12/1/17 at 

23:00


Glucagon (Glucagen) 1 mg Q15M  PRN IM DECREASED GLUCOSE;  Start 12/1/17 at 23:00


Glucose (Glutose) 15 gm Q15M  PRN BUCCAL DECREASED GLUCOSE;  Start 12/1/17 at 23

:00


Fish Oil (Fish Oil) 1,000 mg BID PO  Last administered on 12/4/17at 20:46; 

Admin Dose 1,000 MG;  Start 12/2/17 at 09:00


Clindamycin HCl (Cleocin) 300 mg Q6 PO  Last administered on 12/4/17at 17:14; 

Admin Dose 300 MG;  Start 12/2/17 at 06:00;  Stop 12/12/17 at 05:59


Lactobacillus Acidophilus (Florajen3 Capsule) 1 each BID PO  Last administered 

on 12/4/17at 20:47; Admin Dose 1 EACH;  Start 12/2/17 at 09:00


Tamsulosin HCl (Flomax) 0.4 mg DAILY PO  Last administered on 12/4/17at 08:43; 

Admin Dose 0.4 MG;  Start 12/3/17 at 09:00


Terazosin HCl (Hytrin) 1 mg HS PO  Last administered on 12/4/17at 20:47; Admin 

Dose 1 MG;  Start 12/2/17 at 21:00


Nifedipine (Procardia Xl) 30 mg DAILY PO  Last administered on 12/4/17at 08:42; 

Admin Dose 30 MG;  Start 12/4/17 at 09:00











SANTIAGO MADISON MD Dec 4, 2017 21:09

## 2017-12-04 NOTE — PN
Date/Time of Note


Date/Time of Note


DATE: 12/4/17 


TIME: 14:18





Assessment/Plan


VTE Prophylaxis


VTE Prophylaxis Intervention:  heparin





Lines/Catheters


IV Catheter Type (from Nrs):  Peripheral IV


Urinary Cath still in place:  Yes





Assessment/Plan


Chief Complaint/Hosp Course


Assessment and plan





1.  Acute kidney injury.  Baseline unknown.  Likely of chronic kidney disease.  

Nephrologist following.  Medications to be renally dosed.  Monitor for 

improvement.  Continue with nephrologist recommendations.


2.  Left lower extremity cellulitis.  Continue on antibiotics per ID 

recommendations.  Appears to be improving at present.


3.  BPH.  Continue Flomax and terazosin.  Shaw catheter in place.  Will 

monitor for now.


4.  Normocytic normochromic anemia.  Noted with iron deficiency.  Continue iron 

supplement.


5.  Hypertension.  Continue antihypertensives and adjust needed.


6.  Diabetes.  Continue insulin regimen.  A1c noted at 6.3.


7.  Hypothyroidism.  Continue Synthroid





8.  Dyslipidemia.  Continue on statin medication





9.  Diabetic neuropathy.  Continue on Neurontin





Disposition and plan: Continue antibiotics.  Monitor for improvement of renal 

function.





Discussed plan of care with Dr. Giraldo


Problems:  





Subjective


24 Hr Interval Summary


Free Text/Dictation


resting at this time. no s/s of distress. comfortable at present





Exam/Review of Systems


Vital Signs


Vitals





 Vital Signs








  Date Time  Temp Pulse Resp B/P Pulse Ox O2 Delivery O2 Flow Rate FiO2


 


12/4/17 12:00  73      


 


12/4/17 11:57 98.0  18 134/76 99   


 


12/1/17 22:43      Room Air  














 Intake and Output   


 


 12/3/17 12/3/17 12/4/17





 15:00 23:00 07:00


 


Intake Total  3310 ml 560 ml


 


Output Total  950 ml 


 


Balance  2360 ml 560 ml











Exam


Constitutional:  alert, oriented


Psych:  nl mood/affect


Head:  normocephalic


Eyes:  nl conjunctiva


Neck:  non-tender, supple


Respiratory:  clear to auscultation, normal air movement


Cardiovascular:  regular rate and rhythm


Gastrointestinal:  non-tender, soft


Musculoskeletal:  No swelling


Neurological:  CNS II-XII intact, nl mental status, nl speech


Skin:  other (less erythema LLE )





Results


Result Diagram:  


12/4/17 0703                                                                   

             12/4/17 0703





Results 24 hrs





Laboratory Tests








Test


  12/3/17


17:08 12/3/17


20:28 12/4/17


07:03 12/4/17


08:39


 


Bedside Glucose 153   149    149  


 


White Blood Count   8.4   


 


Red Blood Count   3.12  L 


 


Hemoglobin   9.6  L 


 


Hematocrit   26.4  L 


 


Mean Corpuscular Volume   84.6   


 


Mean Corpuscular Hemoglobin   30.8   


 


Mean Corpuscular Hemoglobin


Concent 


  


  36.4  


  


 


 


Red Cell Distribution Width   12.2   


 


Platelet Count   138  L 


 


Mean Platelet Volume   11.9  H 


 


Neutrophils %   64.8   


 


Lymphocytes %   22.1   


 


Monocytes %   8.1   


 


Eosinophils %   4.3   


 


Basophils %   0.5   


 


Nucleated Red Blood Cells %   0.0   


 


Neutrophils #   5.4   


 


Lymphocytes #   1.9   


 


Monocytes #   0.7   


 


Eosinophils #   0.4   


 


Basophils #   0.0   


 


Nucleated Red Blood Cells #   0.0   


 


Sodium Level   131  L 


 


Potassium Level   5.2  H 


 


Chloride Level   101   


 


Carbon Dioxide Level   23   


 


Anion Gap   12   


 


Blood Urea Nitrogen   44  H 


 


Creatinine   3.87  H 


 


Glucose Level   136   


 


Calcium Level   8.3  L 


 


Phosphorus Level   4.4   


 


Magnesium Level   1.6  L 














Test


  12/4/17


12:19 


  


  


 


 


Bedside Glucose 273  H   











Medications


Medications





 Current Medications


Ondansetron HCl (Zofran Tab) 4 mg Q6H  PRN PO NAUSEA AND/OR VOMITING;  Start 12/ 1/17 at 23:00


Acetaminophen (Tylenol Tab) 650 mg Q6H  PRN PO PAIN LEVEL 1-3 OR FEVER;  Start 

12/1/17 at 23:00


Acetaminophen/ Hydrocodone Bitart (Norco (5/325)) 1 tab Q6H  PRN PO PAIN LEVEL 4

-6;  Start 12/1/17 at 23:00


Heparin Sodium (Porcine) (Heparin  (5000 Units/0.5 ml)) 5,000 unit Q8 SC  Last 

administered on 12/4/17at 13:26; Admin Dose 5,000 UNIT;  Start 12/1/17 at 23:00


Atorvastatin Calcium (Lipitor) 40 mg QHS PO  Last administered on 12/3/17at 20:

19; Admin Dose 40 MG;  Start 12/1/17 at 23:00


Carvedilol (Coreg) 12.5 mg BID PO  Last administered on 12/4/17at 08:42; Admin 

Dose 12.5 MG;  Start 12/1/17 at 23:00


Gabapentin (Neurontin) 600 mg TID PO  Last administered on 12/4/17at 12:23; 

Admin Dose 600 MG;  Start 12/2/17 at 09:00


Hydralazine HCl (Apresoline) 10 mg Q4H  PRN IV ELEVATED SYSTOLIC BP Last 

administered on 12/2/17at 08:41; Admin Dose 10 MG;  Start 12/1/17 at 23:00


Diagnostic Test (Pha) (Accu-Chek) 1 ea 02 XX  Last administered on 12/3/17at 02:

49; Admin Dose 1 EA;  Start 12/2/17 at 02:00


Miscellaneous Information 1 ea NOTE XX ;  Start 12/1/17 at 23:00


Glucose (Glutose) 15 gm Q15M  PRN PO DECREASED GLUCOSE;  Start 12/1/17 at 23:00


Glucose (Glutose) 22.5 gm Q15M  PRN PO DECREASED GLUCOSE;  Start 12/1/17 at 23:

00


Dextrose (D50w Syringe) 25 ml Q15M  PRN IV DECREASED GLUCOSE;  Start 12/1/17 at 

23:00


Dextrose (D50w Syringe) 50 ml Q15M  PRN IV DECREASED GLUCOSE;  Start 12/1/17 at 

23:00


Glucagon (Glucagen) 1 mg Q15M  PRN IM DECREASED GLUCOSE;  Start 12/1/17 at 23:00


Glucose (Glutose) 15 gm Q15M  PRN BUCCAL DECREASED GLUCOSE;  Start 12/1/17 at 23

:00


Fish Oil (Fish Oil) 1,000 mg BID PO  Last administered on 12/4/17at 08:43; 

Admin Dose 1,000 MG;  Start 12/2/17 at 09:00


Clindamycin HCl (Cleocin) 300 mg Q6 PO  Last administered on 12/4/17at 12:23; 

Admin Dose 300 MG;  Start 12/2/17 at 06:00;  Stop 12/12/17 at 05:59


Lactobacillus Acidophilus (Florajen3 Capsule) 1 each BID PO  Last administered 

on 12/4/17at 08:42; Admin Dose 1 EACH;  Start 12/2/17 at 09:00


Tamsulosin HCl (Flomax) 0.4 mg DAILY PO  Last administered on 12/4/17at 08:43; 

Admin Dose 0.4 MG;  Start 12/3/17 at 09:00


Terazosin HCl 1 mg 1 mg HS PO  Last administered on 12/3/17at 20:18; Admin Dose 

1 MG;  Start 12/2/17 at 21:00


Ferric Sodium Gluconate Complex/ Sodium Chloride (Ferrlecit/NS) 110 ml @  100 

mls/hr Q24H IVPB  Last administered on 12/3/17at 16:41; Admin Dose 100 MLS/HR;  

Start 12/2/17 at 16:00;  Stop 12/4/17 at 17:05


Nifedipine 30 mg 30 mg DAILY PO  Last administered on 12/4/17at 08:42; Admin 

Dose 30 MG;  Start 12/4/17 at 09:00


Magnesium Sulfate (Magnesium Sulfate 2 Gm/50 ml) 50 ml @ 25 mls/hr ONCE  ONCE 

IVPB  Last administered on 12/4/17at 13:13; Admin Dose 25 MLS/HR;  Start 12/4/ 17 at 13:30;  Stop 12/4/17 at 15:29











MARGIE WALTON Dec 4, 2017 14:22

## 2017-12-04 NOTE — CONS
Date/Time of Note


Date/Time of Note


DATE: 12/4/17 


TIME: 14:29





Assessment/Plan


Assessment/Plan


Chief Complaint/Hosp Course


SUBJECTIVE:  No acute changes.  The patient is alert, feels good.  Denies pain.

  Looks comfortable.  No fevers.  


  


ANTIMICROBIALS:  The patient is on clindamycin.


 


PHYSICAL EXAMINATION:


GENERAL:  Well-developed, middle-aged  man who is alert, in no distress.


HEENT:  Head atraumatic, normocephalic.  Sclerae anicteric.  Buccal mucosa pink.


NECK:  Supple.


CHEST:  Rise symmetrical.  Breath sounds clear.


HEART:  S1, S2.


ABDOMEN:  Soft.  Bowel tones present.


EXTREMITIES:  Left lower extremity, resolving erythema.


 


ASSESSMENT:


1.  Left lower extremity cellulitis, improving


2.  Acute kidney injury, possibly on chronic kidney disease.


3.  BPH.


4.  Diabetes.


5.  Hypertension.


 


PLAN:  Patient remains stable.  He is being seen by pulmonary and nephrology 

team.  Two-D echo revealed ejection fraction of 60% with normal appearance of 

mitral, aortic and tricuspid valve.  Continue present care.  Keep left lower 

extremity elevated.  Follow recommendations of specialists.


Problems:  





Consultation Date/Type/Reason


Admit Date/Time


Dec 1, 2017 at 22:22


Initial Consult Date


12/2/17


Type of Consultation:  id


Referring Provider:  MARCOS BOYLE





Exam/Review of Systems


Vital Signs


Vitals





 Vital Signs








  Date Time  Temp Pulse Resp B/P Pulse Ox O2 Delivery O2 Flow Rate FiO2


 


12/4/17 12:00  73      


 


12/4/17 11:57 98.0  18 134/76 99   


 


12/1/17 22:43      Room Air  














 Intake and Output   


 


 12/3/17 12/3/17 12/4/17





 15:00 23:00 07:00


 


Intake Total  3310 ml 560 ml


 


Output Total  950 ml 


 


Balance  2360 ml 560 ml











Results


Result Diagram:  


12/4/17 0703                                                                   

             12/4/17 0703





Results 24 hrs





Laboratory Tests








Test


  12/3/17


17:08 12/3/17


20:28 12/4/17


07:03 12/4/17


08:39


 


Bedside Glucose 153   149    149  


 


White Blood Count   8.4   


 


Red Blood Count   3.12  L 


 


Hemoglobin   9.6  L 


 


Hematocrit   26.4  L 


 


Mean Corpuscular Volume   84.6   


 


Mean Corpuscular Hemoglobin   30.8   


 


Mean Corpuscular Hemoglobin


Concent 


  


  36.4  


  


 


 


Red Cell Distribution Width   12.2   


 


Platelet Count   138  L 


 


Mean Platelet Volume   11.9  H 


 


Neutrophils %   64.8   


 


Lymphocytes %   22.1   


 


Monocytes %   8.1   


 


Eosinophils %   4.3   


 


Basophils %   0.5   


 


Nucleated Red Blood Cells %   0.0   


 


Neutrophils #   5.4   


 


Lymphocytes #   1.9   


 


Monocytes #   0.7   


 


Eosinophils #   0.4   


 


Basophils #   0.0   


 


Nucleated Red Blood Cells #   0.0   


 


Sodium Level   131  L 


 


Potassium Level   5.2  H 


 


Chloride Level   101   


 


Carbon Dioxide Level   23   


 


Anion Gap   12   


 


Blood Urea Nitrogen   44  H 


 


Creatinine   3.87  H 


 


Glucose Level   136   


 


Calcium Level   8.3  L 


 


Phosphorus Level   4.4   


 


Magnesium Level   1.6  L 














Test


  12/4/17


12:19 


  


  


 


 


Bedside Glucose 273  H   











Medications


Medications





 Current Medications


Ondansetron HCl (Zofran Tab) 4 mg Q6H  PRN PO NAUSEA AND/OR VOMITING;  Start 12/ 1/17 at 23:00


Acetaminophen (Tylenol Tab) 650 mg Q6H  PRN PO PAIN LEVEL 1-3 OR FEVER;  Start 

12/1/17 at 23:00


Acetaminophen/ Hydrocodone Bitart (Norco (5/325)) 1 tab Q6H  PRN PO PAIN LEVEL 4

-6;  Start 12/1/17 at 23:00


Heparin Sodium (Porcine) (Heparin  (5000 Units/0.5 ml)) 5,000 unit Q8 SC  Last 

administered on 12/4/17at 13:26; Admin Dose 5,000 UNIT;  Start 12/1/17 at 23:00


Atorvastatin Calcium (Lipitor) 40 mg QHS PO  Last administered on 12/3/17at 20:

19; Admin Dose 40 MG;  Start 12/1/17 at 23:00


Carvedilol (Coreg) 12.5 mg BID PO  Last administered on 12/4/17at 08:42; Admin 

Dose 12.5 MG;  Start 12/1/17 at 23:00


Gabapentin (Neurontin) 600 mg TID PO  Last administered on 12/4/17at 12:23; 

Admin Dose 600 MG;  Start 12/2/17 at 09:00


Hydralazine HCl (Apresoline) 10 mg Q4H  PRN IV ELEVATED SYSTOLIC BP Last 

administered on 12/2/17at 08:41; Admin Dose 10 MG;  Start 12/1/17 at 23:00


Diagnostic Test (Pha) (Accu-Chek) 1 ea 02 XX  Last administered on 12/3/17at 02:

49; Admin Dose 1 EA;  Start 12/2/17 at 02:00


Miscellaneous Information 1 ea NOTE XX ;  Start 12/1/17 at 23:00


Glucose (Glutose) 15 gm Q15M  PRN PO DECREASED GLUCOSE;  Start 12/1/17 at 23:00


Glucose (Glutose) 22.5 gm Q15M  PRN PO DECREASED GLUCOSE;  Start 12/1/17 at 23:

00


Dextrose (D50w Syringe) 25 ml Q15M  PRN IV DECREASED GLUCOSE;  Start 12/1/17 at 

23:00


Dextrose (D50w Syringe) 50 ml Q15M  PRN IV DECREASED GLUCOSE;  Start 12/1/17 at 

23:00


Glucagon (Glucagen) 1 mg Q15M  PRN IM DECREASED GLUCOSE;  Start 12/1/17 at 23:00


Glucose (Glutose) 15 gm Q15M  PRN BUCCAL DECREASED GLUCOSE;  Start 12/1/17 at 23

:00


Fish Oil (Fish Oil) 1,000 mg BID PO  Last administered on 12/4/17at 08:43; 

Admin Dose 1,000 MG;  Start 12/2/17 at 09:00


Clindamycin HCl (Cleocin) 300 mg Q6 PO  Last administered on 12/4/17at 12:23; 

Admin Dose 300 MG;  Start 12/2/17 at 06:00;  Stop 12/12/17 at 05:59


Lactobacillus Acidophilus (Florajen3 Capsule) 1 each BID PO  Last administered 

on 12/4/17at 08:42; Admin Dose 1 EACH;  Start 12/2/17 at 09:00


Tamsulosin HCl (Flomax) 0.4 mg DAILY PO  Last administered on 12/4/17at 08:43; 

Admin Dose 0.4 MG;  Start 12/3/17 at 09:00


Terazosin HCl 1 mg 1 mg HS PO  Last administered on 12/3/17at 20:18; Admin Dose 

1 MG;  Start 12/2/17 at 21:00


Ferric Sodium Gluconate Complex/ Sodium Chloride (Ferrlecit/NS) 110 ml @  100 

mls/hr Q24H IVPB  Last administered on 12/3/17at 16:41; Admin Dose 100 MLS/HR;  

Start 12/2/17 at 16:00;  Stop 12/4/17 at 17:05


Nifedipine 30 mg 30 mg DAILY PO  Last administered on 12/4/17at 08:42; Admin 

Dose 30 MG;  Start 12/4/17 at 09:00


Magnesium Sulfate (Magnesium Sulfate 2 Gm/50 ml) 50 ml @ 25 mls/hr ONCE  ONCE 

IVPB  Last administered on 12/4/17at 13:13; Admin Dose 25 MLS/HR;  Start 12/4/ 17 at 13:30;  Stop 12/4/17 at 15:29











YASMIN BOURNE NP Dec 4, 2017 14:31

## 2017-12-05 VITALS — HEART RATE: 63 BPM

## 2017-12-05 VITALS — SYSTOLIC BLOOD PRESSURE: 118 MMHG | DIASTOLIC BLOOD PRESSURE: 65 MMHG | RESPIRATION RATE: 16 BRPM

## 2017-12-05 VITALS — DIASTOLIC BLOOD PRESSURE: 68 MMHG | SYSTOLIC BLOOD PRESSURE: 125 MMHG | RESPIRATION RATE: 18 BRPM

## 2017-12-05 VITALS — HEART RATE: 66 BPM

## 2017-12-05 VITALS — HEART RATE: 74 BPM

## 2017-12-05 VITALS — DIASTOLIC BLOOD PRESSURE: 68 MMHG | SYSTOLIC BLOOD PRESSURE: 124 MMHG | RESPIRATION RATE: 16 BRPM

## 2017-12-05 VITALS — SYSTOLIC BLOOD PRESSURE: 102 MMHG | RESPIRATION RATE: 19 BRPM | DIASTOLIC BLOOD PRESSURE: 56 MMHG

## 2017-12-05 VITALS — SYSTOLIC BLOOD PRESSURE: 107 MMHG | RESPIRATION RATE: 18 BRPM | DIASTOLIC BLOOD PRESSURE: 59 MMHG

## 2017-12-05 VITALS — HEART RATE: 64 BPM

## 2017-12-05 VITALS — DIASTOLIC BLOOD PRESSURE: 65 MMHG | SYSTOLIC BLOOD PRESSURE: 134 MMHG | RESPIRATION RATE: 19 BRPM

## 2017-12-05 VITALS — HEART RATE: 62 BPM

## 2017-12-05 LAB
ANION GAP SERPL CALC-SCNC: 12 MMOL/L (ref 8–16)
BASOPHILS # BLD AUTO: 0 10^3/UL (ref 0–0.1)
BASOPHILS NFR BLD: 0.6 % (ref 0–2)
BUN SERPL-MCNC: 42 MG/DL (ref 7–20)
CALCIUM SERPL-MCNC: 8.1 MG/DL (ref 8.4–10.2)
CHLORIDE SERPL-SCNC: 97 MMOL/L (ref 97–110)
CO2 SERPL-SCNC: 24 MMOL/L (ref 21–31)
CREAT SERPL-MCNC: 3.7 MG/DL (ref 0.61–1.24)
EOSINOPHIL # BLD: 0.4 10^3/UL (ref 0–0.5)
EOSINOPHIL NFR BLD: 6.1 % (ref 0–7)
ERYTHROCYTE [DISTWIDTH] IN BLOOD BY AUTOMATED COUNT: 11.9 % (ref 11.5–14.5)
GLUCOSE SERPL-MCNC: 134 MG/DL (ref 70–220)
HCT VFR BLD CALC: 25.6 % (ref 42–52)
HGB BLD-MCNC: 9.4 G/DL (ref 14–18)
LYMPHOCYTES # BLD AUTO: 1.8 10^3/UL (ref 0.8–2.9)
LYMPHOCYTES NFR BLD AUTO: 24.6 % (ref 15–51)
MCH RBC QN AUTO: 31.1 PG (ref 29–33)
MCHC RBC AUTO-ENTMCNC: 36.7 G/DL (ref 32–37)
MCV RBC AUTO: 84.8 FL (ref 82–101)
MONOCYTES # BLD: 0.7 10^3/UL (ref 0.3–0.9)
MONOCYTES NFR BLD: 9.3 % (ref 0–11)
NEUTROPHILS # BLD: 4.3 10^3/UL (ref 1.6–7.5)
NEUTROPHILS NFR BLD AUTO: 59.1 % (ref 39–77)
NRBC # BLD MANUAL: 0 10^3/UL (ref 0–0)
NRBC BLD AUTO-RTO: 0 /100WBC (ref 0–0)
PLATELET # BLD: 134 10^3/UL (ref 140–415)
PMV BLD AUTO: 12.4 FL (ref 7.4–10.4)
POTASSIUM SERPL-SCNC: 4.8 MMOL/L (ref 3.5–5.1)
RBC # BLD AUTO: 3.02 10^6/UL (ref 4.7–6.1)
SODIUM SERPL-SCNC: 128 MMOL/L (ref 135–144)
WBC # BLD AUTO: 7.2 10^3/UL (ref 4.8–10.8)

## 2017-12-05 RX ADMIN — Medication SCH EACH: at 08:46

## 2017-12-05 RX ADMIN — DOCUSATE SODIUM PRN MG: 100 CAPSULE, LIQUID FILLED ORAL at 21:08

## 2017-12-05 RX ADMIN — GABAPENTIN SCH MG: 300 CAPSULE ORAL at 08:43

## 2017-12-05 RX ADMIN — HEPARIN SODIUM SCH UNIT: 5000 INJECTION, SOLUTION INTRAVENOUS; SUBCUTANEOUS at 21:07

## 2017-12-05 RX ADMIN — ATORVASTATIN CALCIUM SCH MG: 40 TABLET, FILM COATED ORAL at 21:06

## 2017-12-05 RX ADMIN — HEPARIN SODIUM SCH UNIT: 5000 INJECTION, SOLUTION INTRAVENOUS; SUBCUTANEOUS at 14:34

## 2017-12-05 RX ADMIN — HEPARIN SODIUM SCH UNIT: 5000 INJECTION, SOLUTION INTRAVENOUS; SUBCUTANEOUS at 05:43

## 2017-12-05 RX ADMIN — GABAPENTIN SCH MG: 300 CAPSULE ORAL at 21:06

## 2017-12-05 RX ADMIN — GABAPENTIN SCH MG: 300 CAPSULE ORAL at 12:13

## 2017-12-05 RX ADMIN — CLINDAMYCIN HYDROCHLORIDE SCH MG: 300 CAPSULE ORAL at 05:41

## 2017-12-05 RX ADMIN — SENNOSIDES PRN TAB: 8.6 TABLET, FILM COATED ORAL at 21:08

## 2017-12-05 RX ADMIN — SILVER SULFADIAZINE SCH APPLIC: 10 CREAM TOPICAL at 21:06

## 2017-12-05 RX ADMIN — OMEGA-3 FATTY ACIDS CAP DELAYED RELEASE 1000 MG SCH MG: 1000 CAPSULE DELAYED RELEASE at 21:05

## 2017-12-05 RX ADMIN — Medication SCH EACH: at 21:05

## 2017-12-05 RX ADMIN — NIFEDIPINE SCH MG: 60 TABLET, FILM COATED, EXTENDED RELEASE ORAL at 08:45

## 2017-12-05 RX ADMIN — OMEGA-3 FATTY ACIDS CAP DELAYED RELEASE 1000 MG SCH MG: 1000 CAPSULE DELAYED RELEASE at 08:42

## 2017-12-05 RX ADMIN — CLINDAMYCIN HYDROCHLORIDE SCH MG: 300 CAPSULE ORAL at 17:41

## 2017-12-05 RX ADMIN — LEVOTHYROXINE SODIUM SCH MCG: 75 TABLET ORAL at 06:31

## 2017-12-05 RX ADMIN — TERAZOSIN HYDROCHLORIDE ANHYDROUS SCH MG: 1 CAPSULE ORAL at 21:06

## 2017-12-05 RX ADMIN — FOLIC ACID SCH MLS/HR: 5 INJECTION, SOLUTION INTRAMUSCULAR; INTRAVENOUS; SUBCUTANEOUS at 17:42

## 2017-12-05 RX ADMIN — CLINDAMYCIN HYDROCHLORIDE SCH MG: 300 CAPSULE ORAL at 12:13

## 2017-12-05 RX ADMIN — TAMSULOSIN HYDROCHLORIDE SCH MG: 0.4 CAPSULE ORAL at 08:45

## 2017-12-05 NOTE — CONS
Date/Time of Note


Date/Time of Note


DATE: 12/5/17 


TIME: 18:12





Assessment/Plan


Assessment/Plan


Chief Complaint/Hosp Course


57-year-old male has an enlarged prostate urinary retention and acute on 

chronic renal failure after the ultrasound of the kidney was done and 

demonstrated that the patient may have been in urinary retention a Shaw 

catheter was put in and 500 mL drained out he had the Shaw catheter now for 3 

days however his renal function did not improve and his creatinine did go up 

instead of coming down.  We will keep the Shaw catheter in to give him more 

chance and see if the renal function will improve and I also ordered a serum 

PSA on him.  He is already on tamsulosin with the hope that when the catheter 

is removed he will be able to urinate and empty his bladder better.  I will 

also send urine for culture and sensitivity


Problems:  





Consultation Date/Type/Reason


Admit Date/Time


Dec 1, 2017 at 22:22


Date of Consultation:  Dec 5, 2017


Type of Consultation:  Urology


Reason for Consultation


Enlarged prostate, acute kidney injury on chronic kidney disease





Hx of Present Illness


57-year-old male presented to the hospital was cellulitis of his left lower 

extremity.  He does have elevated BUN and creatinine.  He did have a renal 

ultrasound which showed normal kidneys and the bladder was distended.  He 

voided about 250 mL and then a Shaw catheter was put in and 500 mL drained.  

Prior to his admission he states that he has nocturia 1, during the day he 

voids every 2-3 hours, he has a slow urinary stream, he denies any dysuria and 

no history of gross hematuria he does feel he empties his bladder well and he 

denies any postvoid dribbling


Constitutional:  no complaints


Eyes:  no complaints


ENT:  no complaints


Respiratory:  no complaints


Cardiovascular:  no complaints


Gastrointestinal:  no complaints


Genitourinary:  other (As per history of present illness)


Musculoskeletal:  no complaints


Skin:  skin lesions (Left lower extremity cellulitis)


Neurologic:  no complaints


Endocrine:  no complaints


Psychological:  no complaints





Past Medical History


Medical History:  diabetes, hypertension, hypothyroid





Past Surgical History


Past Surgical Hx:  no surgical history





Family History


Significant Family History:  no pertinent family hx





Social History


Alcohol Use:  none


Smoking Status:  Never smoker


Drug Use:  none





Exam/Review of Systems


Vital Signs


Vitals





 Vital Signs








  Date Time  Temp Pulse Resp B/P Pulse Ox O2 Delivery O2 Flow Rate FiO2


 


12/5/17 16:00  63      


 


12/5/17 15:57 98.0  18 107/59 98   


 


12/1/17 22:43      Room Air  














 Intake and Output   


 


 12/4/17 12/4/17 12/5/17





 15:00 23:00 07:00


 


Intake Total  1200 ml 350 ml


 


Output Total  2400 ml 1400 ml


 


Balance  -1200 ml -1050 ml











Exam


Constitutional:  alert, oriented


Psych:  no complaints


Head:  normocephalic


Eyes:  nl conjunctiva


Neck:  supple


Respiratory:  normal air movement


Cardiovascular:  No jugular venous distention (JVD)


Gastrointestinal:  soft, 


   No ascites


Genitourinary - Male:  nl penis, nl scrotum, other (Rectal exam large and soft 

prostate)


Musculoskeletal:  other (Cellulitis of left lower extremity)


Extremities:  No calf tenderness





Results


Result Diagram:  


12/5/17 0937                                                                   

             12/5/17 0937





Results 24 hrs





Laboratory Tests








Test


  12/4/17


20:44 12/5/17


09:14 12/5/17


09:37 12/5/17


12:11


 


Bedside Glucose 159   286  H  137  


 


White Blood Count   7.2   


 


Red Blood Count   3.02  L 


 


Hemoglobin   9.4  L 


 


Hematocrit   25.6  L 


 


Mean Corpuscular Volume   84.8   


 


Mean Corpuscular Hemoglobin   31.1   


 


Mean Corpuscular Hemoglobin


Concent 


  


  36.7  


  


 


 


Red Cell Distribution Width   11.9   


 


Platelet Count   134  L 


 


Mean Platelet Volume   12.4  H 


 


Neutrophils %   59.1   


 


Lymphocytes %   24.6   


 


Monocytes %   9.3   


 


Eosinophils %   6.1   


 


Basophils %   0.6   


 


Nucleated Red Blood Cells %   0.0   


 


Neutrophils #   4.3   


 


Lymphocytes #   1.8   


 


Monocytes #   0.7   


 


Eosinophils #   0.4   


 


Basophils #   0.0   


 


Nucleated Red Blood Cells #   0.0   


 


Sodium Level   128  L 


 


Potassium Level   4.8   


 


Chloride Level   97   


 


Carbon Dioxide Level   24   


 


Anion Gap   12   


 


Blood Urea Nitrogen   42  H 


 


Creatinine   3.70  H 


 


Glucose Level   134   


 


Calcium Level   8.1  L 














Test


  12/5/17


17:30 


  


  


 


 


Bedside Glucose 133     











Imaging


Free Text/Dictation


Renal ultrasound:No renal abnormality seen.  Mild to moderate enlargement of 

prostate with impression on posterior inferior bladder. Bladder distension.





Medications


Medications





 Current Medications


Ondansetron HCl (Zofran Tab) 4 mg Q6H  PRN PO NAUSEA AND/OR VOMITING;  Start 12/ 1/17 at 23:00


Acetaminophen (Tylenol Tab) 650 mg Q6H  PRN PO PAIN LEVEL 1-3 OR FEVER;  Start 

12/1/17 at 23:00


Acetaminophen/ Hydrocodone Bitart (Norco (5/325)) 1 tab Q6H  PRN PO PAIN LEVEL 4

-6;  Start 12/1/17 at 23:00


Heparin Sodium (Porcine) (Heparin  (5000 Units/0.5 ml)) 5,000 unit Q8 SC  Last 

administered on 12/5/17at 14:34; Admin Dose 5,000 UNIT;  Start 12/1/17 at 23:00


Atorvastatin Calcium (Lipitor) 40 mg QHS PO  Last administered on 12/4/17at 20:

47; Admin Dose 40 MG;  Start 12/1/17 at 23:00


Carvedilol (Coreg) 12.5 mg BID PO  Last administered on 12/5/17at 08:42; Admin 

Dose 12.5 MG;  Start 12/1/17 at 23:00


Gabapentin (Neurontin) 600 mg TID PO  Last administered on 12/5/17at 12:13; 

Admin Dose 600 MG;  Start 12/2/17 at 09:00


Hydralazine HCl (Apresoline) 10 mg Q4H  PRN IV ELEVATED SYSTOLIC BP Last 

administered on 12/2/17at 08:41; Admin Dose 10 MG;  Start 12/1/17 at 23:00


Diagnostic Test (Pha) (Accu-Chek) 1 ea 02 XX  Last administered on 12/3/17at 02:

49; Admin Dose 1 EA;  Start 12/2/17 at 02:00


Miscellaneous Information 1 ea NOTE XX ;  Start 12/1/17 at 23:00


Glucose (Glutose) 15 gm Q15M  PRN PO DECREASED GLUCOSE;  Start 12/1/17 at 23:00


Glucose (Glutose) 22.5 gm Q15M  PRN PO DECREASED GLUCOSE;  Start 12/1/17 at 23:

00


Dextrose (D50w Syringe) 25 ml Q15M  PRN IV DECREASED GLUCOSE;  Start 12/1/17 at 

23:00


Dextrose (D50w Syringe) 50 ml Q15M  PRN IV DECREASED GLUCOSE;  Start 12/1/17 at 

23:00


Glucagon (Glucagen) 1 mg Q15M  PRN IM DECREASED GLUCOSE;  Start 12/1/17 at 23:00


Glucose (Glutose) 15 gm Q15M  PRN BUCCAL DECREASED GLUCOSE;  Start 12/1/17 at 23

:00


Fish Oil (Fish Oil) 1,000 mg BID PO  Last administered on 12/5/17at 08:42; 

Admin Dose 1,000 MG;  Start 12/2/17 at 09:00


Clindamycin HCl (Cleocin) 300 mg Q6 PO  Last administered on 12/5/17at 17:41; 

Admin Dose 300 MG;  Start 12/2/17 at 06:00;  Stop 12/12/17 at 05:59


Lactobacillus Acidophilus (Florajen3 Capsule) 1 each BID PO  Last administered 

on 12/5/17at 08:46; Admin Dose 1 EACH;  Start 12/2/17 at 09:00


Tamsulosin HCl (Flomax) 0.4 mg DAILY PO  Last administered on 12/5/17at 08:45; 

Admin Dose 0.4 MG;  Start 12/3/17 at 09:00


Terazosin HCl (Hytrin) 1 mg HS PO  Last administered on 12/4/17at 20:47; Admin 

Dose 1 MG;  Start 12/2/17 at 21:00


Nifedipine (Procardia Xl) 30 mg DAILY PO  Last administered on 12/5/17at 08:45; 

Admin Dose 30 MG;  Start 12/4/17 at 09:00


Silver Sulfadiazine 1 applic 1 applic BID TOP ;  Start 12/5/17 at 21:00


Sodium Chloride (NS) 1,000 ml @  80 mls/hr [id number] IV  Last administered on 12/5 /17at 17:42; Admin Dose 80 MLS/HR;  Start 12/5/17 at 17:00


Docusate Sodium (Colace) 100 mg BID  PRN PO Constipation;  Start 12/5/17 at 18:

00


Senna (Senokot) 1 tab BID  PRN PO Constipation;  Start 12/5/17 at 18:00











PALOMA COOK MD Dec 5, 2017 18:23

## 2017-12-05 NOTE — CONS
Date/Time of Note


Date/Time of Note


DATE: 12/5/17 


TIME: 13:19





Assessment/Plan


Assessment/Plan


Chief Complaint/Hosp Course


SUBJECTIVE:  No acute changes.  The patient is alert, feels good.  Denies pain.

  Looks comfortable.  No fevers.  


  


ANTIMICROBIALS:  Clindamycin.


 


PHYSICAL EXAMINATION:


GENERAL:  Well-developed, middle-aged  man who is alert, in no distress.


HEENT:  Head atraumatic, normocephalic.  Sclerae anicteric.  Buccal mucosa pink.


NECK:  Supple.


CHEST:  Rise symmetrical.  Breath sounds clear.


HEART:  S1, S2.


ABDOMEN:  Soft.  Bowel tones present.


EXTREMITIES:  Left lower extremity, resolving erythema.


 


ASSESSMENT:


1.  Left lower extremity cellulitis, improving


2.  Acute kidney injury, possibly on chronic kidney disease.


3.  BPH.


4.  Diabetes.


5.  Hypertension.


 


PLAN:  Patient remains stable.  He is being seen by pulmonary and nephrology 

team.  LLE looks better, will add Silverdine topical.





DW staff


Problems:  





Consultation Date/Type/Reason


Admit Date/Time


Dec 1, 2017 at 22:22


Initial Consult Date


12/2/17


Type of Consultation:  ID


Referring Provider:  MARCOS BOYLE





Exam/Review of Systems


Vital Signs


Vitals





 Vital Signs








  Date Time  Temp Pulse Resp B/P Pulse Ox O2 Delivery O2 Flow Rate FiO2


 


12/5/17 12:00  62      


 


12/5/17 11:46 98.0  19 102/56 98   


 


12/1/17 22:43      Room Air  














 Intake and Output   


 


 12/4/17 12/4/17 12/5/17





 14:59 22:59 06:59


 


Intake Total  1200 ml 350 ml


 


Output Total  2400 ml 1400 ml


 


Balance  -1200 ml -1050 ml











Results


Result Diagram:  


12/5/17 0937                                                                   

             12/5/17 0937





Results 24 hrs





Laboratory Tests








Test


  12/4/17


17:13 12/4/17


20:44 12/5/17


09:14 12/5/17


09:37


 


Bedside Glucose 143   159   286  H 


 


White Blood Count    7.2  


 


Red Blood Count    3.02  L


 


Hemoglobin    9.4  L


 


Hematocrit    25.6  L


 


Mean Corpuscular Volume    84.8  


 


Mean Corpuscular Hemoglobin    31.1  


 


Mean Corpuscular Hemoglobin


Concent 


  


  


  36.7  


 


 


Red Cell Distribution Width    11.9  


 


Platelet Count    134  L


 


Mean Platelet Volume    12.4  H


 


Neutrophils %    59.1  


 


Lymphocytes %    24.6  


 


Monocytes %    9.3  


 


Eosinophils %    6.1  


 


Basophils %    0.6  


 


Nucleated Red Blood Cells %    0.0  


 


Neutrophils #    4.3  


 


Lymphocytes #    1.8  


 


Monocytes #    0.7  


 


Eosinophils #    0.4  


 


Basophils #    0.0  


 


Nucleated Red Blood Cells #    0.0  


 


Sodium Level    128  L


 


Potassium Level    4.8  


 


Chloride Level    97  


 


Carbon Dioxide Level    24  


 


Anion Gap    12  


 


Blood Urea Nitrogen    42  H


 


Creatinine    3.70  H


 


Glucose Level    134  


 


Calcium Level    8.1  L














Test


  12/5/17


12:11 


  


  


 


 


Bedside Glucose 137     











Medications


Medications





 Current Medications


Ondansetron HCl (Zofran Tab) 4 mg Q6H  PRN PO NAUSEA AND/OR VOMITING;  Start 12/ 1/17 at 23:00


Acetaminophen (Tylenol Tab) 650 mg Q6H  PRN PO PAIN LEVEL 1-3 OR FEVER;  Start 

12/1/17 at 23:00


Acetaminophen/ Hydrocodone Bitart (Norco (5/325)) 1 tab Q6H  PRN PO PAIN LEVEL 4

-6;  Start 12/1/17 at 23:00


Heparin Sodium (Porcine) (Heparin  (5000 Units/0.5 ml)) 5,000 unit Q8 SC  Last 

administered on 12/5/17at 05:43; Admin Dose 5,000 UNIT;  Start 12/1/17 at 23:00


Atorvastatin Calcium (Lipitor) 40 mg QHS PO  Last administered on 12/4/17at 20:

47; Admin Dose 40 MG;  Start 12/1/17 at 23:00


Carvedilol (Coreg) 12.5 mg BID PO  Last administered on 12/5/17at 08:42; Admin 

Dose 12.5 MG;  Start 12/1/17 at 23:00


Gabapentin (Neurontin) 600 mg TID PO  Last administered on 12/5/17at 12:13; 

Admin Dose 600 MG;  Start 12/2/17 at 09:00


Hydralazine HCl (Apresoline) 10 mg Q4H  PRN IV ELEVATED SYSTOLIC BP Last 

administered on 12/2/17at 08:41; Admin Dose 10 MG;  Start 12/1/17 at 23:00


Diagnostic Test (Pha) (Accu-Chek) 1 ea 02 XX  Last administered on 12/3/17at 02:

49; Admin Dose 1 EA;  Start 12/2/17 at 02:00


Miscellaneous Information 1 ea NOTE XX ;  Start 12/1/17 at 23:00


Glucose (Glutose) 15 gm Q15M  PRN PO DECREASED GLUCOSE;  Start 12/1/17 at 23:00


Glucose (Glutose) 22.5 gm Q15M  PRN PO DECREASED GLUCOSE;  Start 12/1/17 at 23:

00


Dextrose (D50w Syringe) 25 ml Q15M  PRN IV DECREASED GLUCOSE;  Start 12/1/17 at 

23:00


Dextrose (D50w Syringe) 50 ml Q15M  PRN IV DECREASED GLUCOSE;  Start 12/1/17 at 

23:00


Glucagon (Glucagen) 1 mg Q15M  PRN IM DECREASED GLUCOSE;  Start 12/1/17 at 23:00


Glucose (Glutose) 15 gm Q15M  PRN BUCCAL DECREASED GLUCOSE;  Start 12/1/17 at 23

:00


Fish Oil (Fish Oil) 1,000 mg BID PO  Last administered on 12/5/17at 08:42; 

Admin Dose 1,000 MG;  Start 12/2/17 at 09:00


Clindamycin HCl (Cleocin) 300 mg Q6 PO  Last administered on 12/5/17at 12:13; 

Admin Dose 300 MG;  Start 12/2/17 at 06:00;  Stop 12/12/17 at 05:59


Lactobacillus Acidophilus (Florajen3 Capsule) 1 each BID PO  Last administered 

on 12/5/17at 08:46; Admin Dose 1 EACH;  Start 12/2/17 at 09:00


Tamsulosin HCl (Flomax) 0.4 mg DAILY PO  Last administered on 12/5/17at 08:45; 

Admin Dose 0.4 MG;  Start 12/3/17 at 09:00


Terazosin HCl (Hytrin) 1 mg HS PO  Last administered on 12/4/17at 20:47; Admin 

Dose 1 MG;  Start 12/2/17 at 21:00


Nifedipine (Procardia Xl) 30 mg DAILY PO  Last administered on 12/5/17at 08:45; 

Admin Dose 30 MG;  Start 12/4/17 at 09:00











YASMIN BOURNE NP Dec 5, 2017 13:19

## 2017-12-05 NOTE — PN
Date/Time of Note


Date/Time of Note


DATE: 12/5/17 


TIME: 11:34





Assessment/Plan


VTE Prophylaxis


VTE Prophylaxis Intervention:  heparin





Lines/Catheters


IV Catheter Type (from Nrs):  Peripheral IV


Urinary Cath still in place:  Yes





Assessment/Plan


Chief Complaint/Hosp Course


Assessment and plan





1.  Acute kidney injury.  Baseline unknown.  Likely of chronic kidney disease.  

Nephrologist following.  Medications to be renally dosed.  Monitor for 

improvement.  Continue with nephrologist recommendations.Patient did have renal 

ultrasound showing moderate enlarged prostate with impression on posterior 

inferior bladder with bladder distention.  Will get urologist to follow.





2.  Left lower extremity cellulitis.  Continue on antibiotics per ID 

recommendations.  Appears to be improving at present.





3.  BPH.  Continue Flomax and terazosin.  Schroeder catheter in place.  Will 

monitor for now.





4.  Normocytic normochromic anemia.  Noted with iron deficiency.  Continue iron 

supplement.





5.  Hypertension.  Continue antihypertensives and adjust needed.





6.  Diabetes.  Continue insulin regimen.  A1c noted at 6.3.





7.  Hypothyroidism.  Continue Synthroid





8.  Dyslipidemia.  Continue on statin medication





9.  Diabetic neuropathy.  Continue on Neurontin





Disposition and plan: Continue to monitor renal function.  Urologist to follow 

for enlarged prostate.





Discussed plan of care with Dr. Giraldo


Problems:  





Subjective


24 Hr Interval Summary


Free Text/Dictation


no s/s of distress. denies any pain. family at bedside





Exam/Review of Systems


Vital Signs


Vitals





 Vital Signs








  Date Time  Temp Pulse Resp B/P Pulse Ox O2 Delivery O2 Flow Rate FiO2


 


12/5/17 08:21 98.0 72 19 134/65 98   


 


12/1/17 22:43      Room Air  














 Intake and Output   


 


 12/4/17 12/4/17 12/5/17





 15:00 23:00 07:00


 


Intake Total  1200 ml 350 ml


 


Output Total  2400 ml 1400 ml


 


Balance  -1200 ml -1050 ml











Exam


Constitutional:  alert, oriented


Psych:  nl mood/affect


Head:  normocephalic


Eyes:  nl conjunctiva


Neck:  non-tender, supple


Respiratory:  clear to auscultation, normal air movement


Cardiovascular:  regular rate and rhythm


Gastrointestinal:  non-tender, soft


: schroeder in place


Musculoskeletal:  No swelling


Neurological:  CNS II-XII intact, nl mental status, nl speech


Skin:  other (less erythema LLE )





Results


Result Diagram:  


12/5/17 0937                                                                   

             12/5/17 0937





Results 24 hrs





Laboratory Tests








Test


  12/4/17


12:19 12/4/17


17:13 12/4/17


20:44 12/5/17


09:14


 


Bedside Glucose 273  H 143   159   286  H














Test


  12/5/17


09:37 


  


  


 


 


White Blood Count 7.2     


 


Red Blood Count 3.02  L   


 


Hemoglobin 9.4  L   


 


Hematocrit 25.6  L   


 


Mean Corpuscular Volume 84.8     


 


Mean Corpuscular Hemoglobin 31.1     


 


Mean Corpuscular Hemoglobin


Concent 36.7  


  


  


  


 


 


Red Cell Distribution Width 11.9     


 


Platelet Count 134  L   


 


Mean Platelet Volume 12.4  H   


 


Neutrophils % 59.1     


 


Lymphocytes % 24.6     


 


Monocytes % 9.3     


 


Eosinophils % 6.1     


 


Basophils % 0.6     


 


Nucleated Red Blood Cells % 0.0     


 


Neutrophils # 4.3     


 


Lymphocytes # 1.8     


 


Monocytes # 0.7     


 


Eosinophils # 0.4     


 


Basophils # 0.0     


 


Nucleated Red Blood Cells # 0.0     


 


Sodium Level 128  L   


 


Potassium Level 4.8     


 


Chloride Level 97     


 


Carbon Dioxide Level 24     


 


Anion Gap 12     


 


Blood Urea Nitrogen 42  H   


 


Creatinine 3.70  H   


 


Glucose Level 134     


 


Calcium Level 8.1  L   











Medications


Medications





 Current Medications


Ondansetron HCl (Zofran Tab) 4 mg Q6H  PRN PO NAUSEA AND/OR VOMITING;  Start 12/ 1/17 at 23:00


Acetaminophen (Tylenol Tab) 650 mg Q6H  PRN PO PAIN LEVEL 1-3 OR FEVER;  Start 

12/1/17 at 23:00


Acetaminophen/ Hydrocodone Bitart (Norco (5/325)) 1 tab Q6H  PRN PO PAIN LEVEL 4

-6;  Start 12/1/17 at 23:00


Heparin Sodium (Porcine) (Heparin  (5000 Units/0.5 ml)) 5,000 unit Q8 SC  Last 

administered on 12/5/17at 05:43; Admin Dose 5,000 UNIT;  Start 12/1/17 at 23:00


Atorvastatin Calcium (Lipitor) 40 mg QHS PO  Last administered on 12/4/17at 20:

47; Admin Dose 40 MG;  Start 12/1/17 at 23:00


Carvedilol (Coreg) 12.5 mg BID PO  Last administered on 12/5/17at 08:42; Admin 

Dose 12.5 MG;  Start 12/1/17 at 23:00


Gabapentin (Neurontin) 600 mg TID PO  Last administered on 12/5/17at 08:43; 

Admin Dose 600 MG;  Start 12/2/17 at 09:00


Hydralazine HCl (Apresoline) 10 mg Q4H  PRN IV ELEVATED SYSTOLIC BP Last 

administered on 12/2/17at 08:41; Admin Dose 10 MG;  Start 12/1/17 at 23:00


Diagnostic Test (Pha) (Accu-Chek) 1 ea 02 XX  Last administered on 12/3/17at 02:

49; Admin Dose 1 EA;  Start 12/2/17 at 02:00


Miscellaneous Information 1 ea NOTE XX ;  Start 12/1/17 at 23:00


Glucose (Glutose) 15 gm Q15M  PRN PO DECREASED GLUCOSE;  Start 12/1/17 at 23:00


Glucose (Glutose) 22.5 gm Q15M  PRN PO DECREASED GLUCOSE;  Start 12/1/17 at 23:

00


Dextrose (D50w Syringe) 25 ml Q15M  PRN IV DECREASED GLUCOSE;  Start 12/1/17 at 

23:00


Dextrose (D50w Syringe) 50 ml Q15M  PRN IV DECREASED GLUCOSE;  Start 12/1/17 at 

23:00


Glucagon (Glucagen) 1 mg Q15M  PRN IM DECREASED GLUCOSE;  Start 12/1/17 at 23:00


Glucose (Glutose) 15 gm Q15M  PRN BUCCAL DECREASED GLUCOSE;  Start 12/1/17 at 23

:00


Fish Oil (Fish Oil) 1,000 mg BID PO  Last administered on 12/5/17at 08:42; 

Admin Dose 1,000 MG;  Start 12/2/17 at 09:00


Clindamycin HCl (Cleocin) 300 mg Q6 PO  Last administered on 12/5/17at 05:41; 

Admin Dose 300 MG;  Start 12/2/17 at 06:00;  Stop 12/12/17 at 05:59


Lactobacillus Acidophilus (Florajen3 Capsule) 1 each BID PO  Last administered 

on 12/5/17at 08:46; Admin Dose 1 EACH;  Start 12/2/17 at 09:00


Tamsulosin HCl (Flomax) 0.4 mg DAILY PO  Last administered on 12/5/17at 08:45; 

Admin Dose 0.4 MG;  Start 12/3/17 at 09:00


Terazosin HCl (Hytrin) 1 mg HS PO  Last administered on 12/4/17at 20:47; Admin 

Dose 1 MG;  Start 12/2/17 at 21:00


Nifedipine (Procardia Xl) 30 mg DAILY PO  Last administered on 12/5/17at 08:45; 

Admin Dose 30 MG;  Start 12/4/17 at 09:00











MARGIE WALTON Dec 5, 2017 11:36

## 2017-12-05 NOTE — CONS
Date/Time of Note


Date/Time of Note


DATE: 12/5/17 


TIME: 08:59





Assessment/Plan


Assessment/Plan


Additional Assessment/Plan


1. Non Oliguric Acute kidney injury vs acute kidney injury on CKD 


2. BPH with urinary retention s/p Schroeder catheter placement 


4. H/o possible CKD due to DM nephropathy, pt is not aware about it or has not 

seen any nephrologist as outpatient, I doubt it 


5. DM II


6. HTN


7. Anemia combinatino of iron deficiency anemia and anemia of chronic renal 

disease 


8. LE significant edmea, US negative for DVT, ECHO showed EF 60% with stage I 

diastoilc dysfunction 


9. Accelerated HTN





Plan:


Renal US showed normal size kidneys and normal echogenicity- it showed  

moderately enarlaged prostate causing possible obstructive nephropathy


BUN/Cr still remains elevated, pt s/p schroeder catheter placement, good urine 

output , Na dropped to 128- will give 1 liter NS overnight 


IV iron 100 mg daily X 5 days


Bp stable with  Procardia Xl to 30mg po daily and use IV hydralazine prn SBP> 

150 mm Hg 


we will continue to follow





Consultation Date/Type/Reason


Admit Date/Time


Dec 1, 2017 at 22:22


Initial Consult Date


12/2/17


Type of Consultation:  NEPHROLOGY


Referring Provider:  MARCOS BOYLE





24 HR Interval Summary


Free Text/Dictation


BUN/Cr 42/3.7, na dropped to 128, BP stable





Exam/Review of Systems


Vital Signs


Vitals





 Vital Signs








  Date Time  Temp Pulse Resp B/P Pulse Ox O2 Delivery O2 Flow Rate FiO2


 


12/5/17 08:21 98.0 72 19 134/65 98   


 


12/1/17 22:43      Room Air  














 Intake and Output   


 


 12/4/17 12/4/17 12/5/17





 15:00 23:00 07:00


 


Intake Total  1200 ml 350 ml


 


Output Total  2400 ml 1400 ml


 


Balance  -1200 ml -1050 ml











Exam


Constitutional:  alert


Respiratory:  clear to auscultation, diminished breath sounds, normal air 

movement


Cardiovascular:  nl pulses, regular rate and rhythm


Gastrointestinal:  nl liver, spleen, non-tender, soft, + schroeder catheter 


Musculoskeletal:  nl extremities to inspection


Extremities:  normal pulses


Neurological:  CNS II-XII intact, nl mental status, nl speech, nl strength





Results


Result Diagram:  


12/4/17 0703                                                                   

             12/4/17 0703





Results 24 hrs





Laboratory Tests








Test


  12/4/17


12:19 12/4/17


17:13 12/4/17


20:44


 


Bedside Glucose 273  H 143   159  











Medications


Medications





 Current Medications


Ondansetron HCl (Zofran Tab) 4 mg Q6H  PRN PO NAUSEA AND/OR VOMITING;  Start 12/ 1/17 at 23:00


Acetaminophen (Tylenol Tab) 650 mg Q6H  PRN PO PAIN LEVEL 1-3 OR FEVER;  Start 

12/1/17 at 23:00


Acetaminophen/ Hydrocodone Bitart (Norco (5/325)) 1 tab Q6H  PRN PO PAIN LEVEL 4

-6;  Start 12/1/17 at 23:00


Heparin Sodium (Porcine) (Heparin  (5000 Units/0.5 ml)) 5,000 unit Q8 SC  Last 

administered on 12/5/17at 05:43; Admin Dose 5,000 UNIT;  Start 12/1/17 at 23:00


Atorvastatin Calcium (Lipitor) 40 mg QHS PO  Last administered on 12/4/17at 20:

47; Admin Dose 40 MG;  Start 12/1/17 at 23:00


Carvedilol (Coreg) 12.5 mg BID PO  Last administered on 12/5/17at 08:42; Admin 

Dose 12.5 MG;  Start 12/1/17 at 23:00


Gabapentin (Neurontin) 600 mg TID PO  Last administered on 12/5/17at 08:43; 

Admin Dose 600 MG;  Start 12/2/17 at 09:00


Hydralazine HCl (Apresoline) 10 mg Q4H  PRN IV ELEVATED SYSTOLIC BP Last 

administered on 12/2/17at 08:41; Admin Dose 10 MG;  Start 12/1/17 at 23:00


Diagnostic Test (Pha) (Accu-Chek) 1 ea 02 XX  Last administered on 12/3/17at 02:

49; Admin Dose 1 EA;  Start 12/2/17 at 02:00


Miscellaneous Information 1 ea NOTE XX ;  Start 12/1/17 at 23:00


Glucose (Glutose) 15 gm Q15M  PRN PO DECREASED GLUCOSE;  Start 12/1/17 at 23:00


Glucose (Glutose) 22.5 gm Q15M  PRN PO DECREASED GLUCOSE;  Start 12/1/17 at 23:

00


Dextrose (D50w Syringe) 25 ml Q15M  PRN IV DECREASED GLUCOSE;  Start 12/1/17 at 

23:00


Dextrose (D50w Syringe) 50 ml Q15M  PRN IV DECREASED GLUCOSE;  Start 12/1/17 at 

23:00


Glucagon (Glucagen) 1 mg Q15M  PRN IM DECREASED GLUCOSE;  Start 12/1/17 at 23:00


Glucose (Glutose) 15 gm Q15M  PRN BUCCAL DECREASED GLUCOSE;  Start 12/1/17 at 23

:00


Fish Oil (Fish Oil) 1,000 mg BID PO  Last administered on 12/5/17at 08:42; 

Admin Dose 1,000 MG;  Start 12/2/17 at 09:00


Clindamycin HCl (Cleocin) 300 mg Q6 PO  Last administered on 12/5/17at 05:41; 

Admin Dose 300 MG;  Start 12/2/17 at 06:00;  Stop 12/12/17 at 05:59


Lactobacillus Acidophilus (Florajen3 Capsule) 1 each BID PO  Last administered 

on 12/5/17at 08:46; Admin Dose 1 EACH;  Start 12/2/17 at 09:00


Tamsulosin HCl (Flomax) 0.4 mg DAILY PO  Last administered on 12/5/17at 08:45; 

Admin Dose 0.4 MG;  Start 12/3/17 at 09:00


Terazosin HCl (Hytrin) 1 mg HS PO  Last administered on 12/4/17at 20:47; Admin 

Dose 1 MG;  Start 12/2/17 at 21:00


Nifedipine (Procardia Xl) 30 mg DAILY PO  Last administered on 12/5/17at 08:45; 

Admin Dose 30 MG;  Start 12/4/17 at 09:00











SANTIAGO MADISON MD Dec 5, 2017 08:59

## 2017-12-06 VITALS — RESPIRATION RATE: 20 BRPM | SYSTOLIC BLOOD PRESSURE: 146 MMHG | DIASTOLIC BLOOD PRESSURE: 70 MMHG

## 2017-12-06 VITALS — SYSTOLIC BLOOD PRESSURE: 153 MMHG | RESPIRATION RATE: 20 BRPM | DIASTOLIC BLOOD PRESSURE: 74 MMHG

## 2017-12-06 VITALS — HEART RATE: 82 BPM

## 2017-12-06 VITALS — DIASTOLIC BLOOD PRESSURE: 77 MMHG | SYSTOLIC BLOOD PRESSURE: 150 MMHG | RESPIRATION RATE: 18 BRPM

## 2017-12-06 VITALS — SYSTOLIC BLOOD PRESSURE: 134 MMHG | RESPIRATION RATE: 20 BRPM | DIASTOLIC BLOOD PRESSURE: 71 MMHG

## 2017-12-06 VITALS — HEART RATE: 76 BPM

## 2017-12-06 VITALS — SYSTOLIC BLOOD PRESSURE: 164 MMHG | DIASTOLIC BLOOD PRESSURE: 88 MMHG | RESPIRATION RATE: 20 BRPM

## 2017-12-06 VITALS — SYSTOLIC BLOOD PRESSURE: 138 MMHG | DIASTOLIC BLOOD PRESSURE: 75 MMHG | RESPIRATION RATE: 16 BRPM

## 2017-12-06 VITALS — HEART RATE: 73 BPM

## 2017-12-06 VITALS — HEART RATE: 65 BPM

## 2017-12-06 VITALS — HEART RATE: 70 BPM

## 2017-12-06 LAB
ANION GAP SERPL CALC-SCNC: 12 MMOL/L (ref 8–16)
BASOPHILS # BLD AUTO: 0 10^3/UL (ref 0–0.1)
BASOPHILS NFR BLD: 0.5 % (ref 0–2)
BUN SERPL-MCNC: 43 MG/DL (ref 7–20)
CALCIUM SERPL-MCNC: 8.3 MG/DL (ref 8.4–10.2)
CHLORIDE SERPL-SCNC: 97 MMOL/L (ref 97–110)
CO2 SERPL-SCNC: 22 MMOL/L (ref 21–31)
CREAT SERPL-MCNC: 3.75 MG/DL (ref 0.61–1.24)
EOSINOPHIL # BLD: 0.5 10^3/UL (ref 0–0.5)
EOSINOPHIL NFR BLD: 6.3 % (ref 0–7)
ERYTHROCYTE [DISTWIDTH] IN BLOOD BY AUTOMATED COUNT: 12 % (ref 11.5–14.5)
GLUCOSE SERPL-MCNC: 102 MG/DL (ref 70–220)
HCT VFR BLD CALC: 25.8 % (ref 42–52)
HGB BLD-MCNC: 9.3 G/DL (ref 14–18)
LYMPHOCYTES # BLD AUTO: 1.8 10^3/UL (ref 0.8–2.9)
LYMPHOCYTES NFR BLD AUTO: 22.4 % (ref 15–51)
MCH RBC QN AUTO: 30.1 PG (ref 29–33)
MCHC RBC AUTO-ENTMCNC: 36 G/DL (ref 32–37)
MCV RBC AUTO: 83.5 FL (ref 82–101)
MONOCYTES # BLD: 0.7 10^3/UL (ref 0.3–0.9)
MONOCYTES NFR BLD: 8.9 % (ref 0–11)
NEUTROPHILS # BLD: 4.9 10^3/UL (ref 1.6–7.5)
NEUTROPHILS NFR BLD AUTO: 61.6 % (ref 39–77)
NRBC # BLD MANUAL: 0 10^3/UL (ref 0–0)
NRBC BLD AUTO-RTO: 0 /100WBC (ref 0–0)
PLATELET # BLD: 147 10^3/UL (ref 140–415)
PMV BLD AUTO: 12.4 FL (ref 7.4–10.4)
POTASSIUM SERPL-SCNC: 4.5 MMOL/L (ref 3.5–5.1)
RBC # BLD AUTO: 3.09 10^6/UL (ref 4.7–6.1)
SODIUM SERPL-SCNC: 126 MMOL/L (ref 135–144)
WBC # BLD AUTO: 7.9 10^3/UL (ref 4.8–10.8)

## 2017-12-06 RX ADMIN — GABAPENTIN SCH MG: 300 CAPSULE ORAL at 21:13

## 2017-12-06 RX ADMIN — HEPARIN SODIUM SCH UNIT: 5000 INJECTION, SOLUTION INTRAVENOUS; SUBCUTANEOUS at 05:46

## 2017-12-06 RX ADMIN — Medication SCH EACH: at 21:13

## 2017-12-06 RX ADMIN — OMEGA-3 FATTY ACIDS CAP DELAYED RELEASE 1000 MG SCH MG: 1000 CAPSULE DELAYED RELEASE at 21:13

## 2017-12-06 RX ADMIN — HEPARIN SODIUM SCH UNIT: 5000 INJECTION, SOLUTION INTRAVENOUS; SUBCUTANEOUS at 14:58

## 2017-12-06 RX ADMIN — ATORVASTATIN CALCIUM SCH MG: 40 TABLET, FILM COATED ORAL at 21:13

## 2017-12-06 RX ADMIN — NIFEDIPINE SCH MG: 60 TABLET, FILM COATED, EXTENDED RELEASE ORAL at 09:10

## 2017-12-06 RX ADMIN — Medication SCH EACH: at 09:47

## 2017-12-06 RX ADMIN — SILVER SULFADIAZINE SCH APPLIC: 10 CREAM TOPICAL at 09:11

## 2017-12-06 RX ADMIN — TAMSULOSIN HYDROCHLORIDE SCH MG: 0.4 CAPSULE ORAL at 09:08

## 2017-12-06 RX ADMIN — GABAPENTIN SCH MG: 300 CAPSULE ORAL at 09:09

## 2017-12-06 RX ADMIN — CLINDAMYCIN HYDROCHLORIDE SCH MG: 300 CAPSULE ORAL at 17:41

## 2017-12-06 RX ADMIN — OMEGA-3 FATTY ACIDS CAP DELAYED RELEASE 1000 MG SCH MG: 1000 CAPSULE DELAYED RELEASE at 09:11

## 2017-12-06 RX ADMIN — LEVOTHYROXINE SODIUM SCH MCG: 75 TABLET ORAL at 05:45

## 2017-12-06 RX ADMIN — CLINDAMYCIN HYDROCHLORIDE SCH MG: 300 CAPSULE ORAL at 12:22

## 2017-12-06 RX ADMIN — TERAZOSIN HYDROCHLORIDE ANHYDROUS SCH MG: 1 CAPSULE ORAL at 21:13

## 2017-12-06 RX ADMIN — CLINDAMYCIN HYDROCHLORIDE SCH MG: 300 CAPSULE ORAL at 00:12

## 2017-12-06 RX ADMIN — FOLIC ACID SCH MLS/HR: 5 INJECTION, SOLUTION INTRAMUSCULAR; INTRAVENOUS; SUBCUTANEOUS at 18:00

## 2017-12-06 RX ADMIN — HEPARIN SODIUM SCH UNIT: 5000 INJECTION, SOLUTION INTRAVENOUS; SUBCUTANEOUS at 21:21

## 2017-12-06 RX ADMIN — CLINDAMYCIN HYDROCHLORIDE SCH MG: 300 CAPSULE ORAL at 05:44

## 2017-12-06 RX ADMIN — SILVER SULFADIAZINE SCH APPLIC: 10 CREAM TOPICAL at 21:14

## 2017-12-06 RX ADMIN — FOLIC ACID SCH MLS/HR: 5 INJECTION, SOLUTION INTRAMUSCULAR; INTRAVENOUS; SUBCUTANEOUS at 05:30

## 2017-12-06 RX ADMIN — GABAPENTIN SCH MG: 300 CAPSULE ORAL at 12:22

## 2017-12-06 NOTE — CONS
Date/Time of Note


Date/Time of Note


DATE: 12/6/17 


TIME: 17:05





Assessment/Plan


Assessment/Plan


Additional Assessment/Plan


1. Non Oliguric Acute kidney injury vs acute kidney injury on CKD 


2. BPH with urinary retention s/p Schroeder catheter placement 


4. H/o possible CKD due to DM nephropathy, pt is not aware about it or has not 

seen any nephrologist as outpatient, I doubt it 


5. DM II


6. HTN


7. Anemia combinatino of iron deficiency anemia and anemia of chronic renal 

disease 


8. LE significant edmea, US negative for DVT, ECHO showed EF 60% with stage I 

diastoilc dysfunction 


9. Accelerated HTN





Plan:


BUN/Cr still remains elevated, pt s/p schroeder catheter placement, good urine 

output , Na dropped to 126 dsepite IVF 1 liter, will order Urine Na, Urine osm, 

serum osm and decide for tolvaptan for tomorrow AM 


IV iron 100 mg daily X 5 days


Bp stable with  Procardia Xl to 30mg po daily and use IV hydralazine prn SBP> 

150 mm Hg 


we will continue to follow





Consultation Date/Type/Reason


Admit Date/Time


Dec 1, 2017 at 22:22


Initial Consult Date


12/2/17


Type of Consultation:  NEPHROLOGY


Referring Provider:  MARCOS BOYLE





Exam/Review of Systems


Vital Signs


Vitals





 Vital Signs








  Date Time  Temp Pulse Resp B/P Pulse Ox O2 Delivery O2 Flow Rate FiO2


 


12/6/17 16:04 97.4 75 20 164/88 98   














 Intake and Output   


 


 12/5/17 12/5/17 12/6/17





 14:59 22:59 06:59


 


Intake Total  900 ml 1800 ml


 


Output Total  600 ml 2400 ml


 


Balance  300 ml -600 ml











Exam


Constitutional:  alert


Respiratory:  clear to auscultation, diminished breath sounds, normal air 

movement


Cardiovascular:  nl pulses, regular rate and rhythm


Gastrointestinal:  nl liver, spleen, non-tender, soft, + schroeder catheter 


Musculoskeletal:  nl extremities to inspection


Extremities:  normal pulses


Neurological:  CNS II-XII intact, nl mental status, nl speech, nl strength





Results


Result Diagram:  


12/6/17 0717                                                                   

             12/6/17 0717





Results 24 hrs





Laboratory Tests








Test


  12/5/17


17:30 12/5/17


21:04 12/6/17


07:17 12/6/17


08:28


 


Bedside Glucose 133   119    115  


 


White Blood Count   7.9   


 


Red Blood Count   3.09  L 


 


Hemoglobin   9.3  L 


 


Hematocrit   25.8  L 


 


Mean Corpuscular Volume   83.5   


 


Mean Corpuscular Hemoglobin   30.1   


 


Mean Corpuscular Hemoglobin


Concent 


  


  36.0  


  


 


 


Red Cell Distribution Width   12.0   


 


Platelet Count   147   


 


Mean Platelet Volume   12.4  H 


 


Neutrophils %   61.6   


 


Lymphocytes %   22.4   


 


Monocytes %   8.9   


 


Eosinophils %   6.3   


 


Basophils %   0.5   


 


Nucleated Red Blood Cells %   0.0   


 


Neutrophils #   4.9   


 


Lymphocytes #   1.8   


 


Monocytes #   0.7   


 


Eosinophils #   0.5   


 


Basophils #   0.0   


 


Nucleated Red Blood Cells #   0.0   


 


Sodium Level   126  L 


 


Potassium Level   4.5   


 


Chloride Level   97   


 


Carbon Dioxide Level   22   


 


Anion Gap   12   


 


Blood Urea Nitrogen   43  H 


 


Creatinine   3.75  H 


 


Glucose Level   102   


 


Calcium Level   8.3  L 














Test


  12/6/17


12:15 


  


  


 


 


Bedside Glucose 135     











Medications


Medications





 Current Medications


Ondansetron HCl (Zofran Tab) 4 mg Q6H  PRN PO NAUSEA AND/OR VOMITING;  Start 12/ 1/17 at 23:00


Acetaminophen (Tylenol Tab) 650 mg Q6H  PRN PO PAIN LEVEL 1-3 OR FEVER;  Start 

12/1/17 at 23:00


Acetaminophen/ Hydrocodone Bitart (Norco (5/325)) 1 tab Q6H  PRN PO PAIN LEVEL 4

-6;  Start 12/1/17 at 23:00


Heparin Sodium (Porcine) (Heparin  (5000 Units/0.5 ml)) 5,000 unit Q8 SC  Last 

administered on 12/6/17at 14:58; Admin Dose 5,000 UNIT;  Start 12/1/17 at 23:00


Atorvastatin Calcium (Lipitor) 40 mg QHS PO  Last administered on 12/5/17at 21:

06; Admin Dose 40 MG;  Start 12/1/17 at 23:00


Carvedilol (Coreg) 12.5 mg BID PO  Last administered on 12/6/17at 09:10; Admin 

Dose 12.5 MG;  Start 12/1/17 at 23:00


Gabapentin (Neurontin) 600 mg TID PO  Last administered on 12/6/17at 12:22; 

Admin Dose 600 MG;  Start 12/2/17 at 09:00


Hydralazine HCl (Apresoline) 10 mg Q4H  PRN IV ELEVATED SYSTOLIC BP Last 

administered on 12/2/17at 08:41; Admin Dose 10 MG;  Start 12/1/17 at 23:00


Diagnostic Test (Pha) (Accu-Chek) 1 ea 02 XX  Last administered on 12/3/17at 02:

49; Admin Dose 1 EA;  Start 12/2/17 at 02:00


Miscellaneous Information 1 ea NOTE XX ;  Start 12/1/17 at 23:00


Glucose (Glutose) 15 gm Q15M  PRN PO DECREASED GLUCOSE;  Start 12/1/17 at 23:00


Glucose (Glutose) 22.5 gm Q15M  PRN PO DECREASED GLUCOSE;  Start 12/1/17 at 23:

00


Dextrose (D50w Syringe) 25 ml Q15M  PRN IV DECREASED GLUCOSE;  Start 12/1/17 at 

23:00


Dextrose (D50w Syringe) 50 ml Q15M  PRN IV DECREASED GLUCOSE;  Start 12/1/17 at 

23:00


Glucagon (Glucagen) 1 mg Q15M  PRN IM DECREASED GLUCOSE;  Start 12/1/17 at 23:00


Glucose (Glutose) 15 gm Q15M  PRN BUCCAL DECREASED GLUCOSE;  Start 12/1/17 at 23

:00


Fish Oil (Fish Oil) 1,000 mg BID PO  Last administered on 12/6/17at 09:11; 

Admin Dose 1,000 MG;  Start 12/2/17 at 09:00


Clindamycin HCl (Cleocin) 300 mg Q6 PO  Last administered on 12/6/17at 12:22; 

Admin Dose 300 MG;  Start 12/2/17 at 06:00;  Stop 12/12/17 at 05:59


Lactobacillus Acidophilus (Florajen3 Capsule) 1 each BID PO  Last administered 

on 12/6/17at 09:47; Admin Dose 1 EACH;  Start 12/2/17 at 09:00


Tamsulosin HCl (Flomax) 0.4 mg DAILY PO  Last administered on 12/6/17at 09:08; 

Admin Dose 0.4 MG;  Start 12/3/17 at 09:00


Terazosin HCl (Hytrin) 1 mg HS PO  Last administered on 12/5/17at 21:06; Admin 

Dose 1 MG;  Start 12/2/17 at 21:00


Nifedipine (Procardia Xl) 30 mg DAILY PO  Last administered on 12/6/17at 09:10; 

Admin Dose 30 MG;  Start 12/4/17 at 09:00


Silver Sulfadiazine 1 applic 1 applic BID TOP  Last administered on 12/6/17at 09

:11; Admin Dose 1 APPLIC;  Start 12/5/17 at 21:00


Sodium Chloride (NS) 1,000 ml @  80 mls/hr [id number] IV  Last administered on 12/5 /17at 17:42; Admin Dose 80 MLS/HR;  Start 12/5/17 at 17:00


Docusate Sodium (Colace) 100 mg BID  PRN PO Constipation Last administered on 12 /5/17at 21:08; Admin Dose 100 MG;  Start 12/5/17 at 18:00


Senna (Senokot) 1 tab BID  PRN PO Constipation Last administered on 12/5/17at 21

:08; Admin Dose 1 TAB;  Start 12/5/17 at 18:00











SANTIAGO MADISON MD Dec 6, 2017 17:06

## 2017-12-06 NOTE — PN
Date/Time of Note


Date/Time of Note


DATE: 12/6/17 


TIME: 09:51





Assessment/Plan


VTE Prophylaxis


VTE Prophylaxis Intervention:  heparin





Lines/Catheters


IV Catheter Type (from Nrs):  Peripheral IV


Urinary Cath still in place:  Yes





Assessment/Plan


Chief Complaint/Hosp Course


Assessment and plan





1.  Acute kidney injury..  Nephrologist following.  Medications to be renally 

dosed.  Monitor for improvement.  Continue with nephrologist 

recommendations.Patient did have renal ultrasound showing moderate enlarged 

prostate with impression on posterior inferior bladder with bladder distention.

  f/u urologist recs 





2.  Left lower extremity cellulitis.  Continue on antibiotics per ID 

recommendations. improving





3.  BPH.  Continue Flomax and terazosin.  Schroeder catheter in place.  continue 

with urologist recs 





4.  Normocytic normochromic anemia.  Noted with iron deficiency.  Continue iron 

supplement.





5.  Hypertension.  Continue antihypertensives and adjust needed.





6.  Diabetes.  Continue insulin regimen.  A1c noted at 6.3.





7.  Hypothyroidism.  Continue Synthroid





8.  Dyslipidemia.  Continue on statin medication





9.  Diabetic neuropathy.  Continue on Neurontin





10. Hyponatremia. monitor trend. IVF per nephrology





Disposition and plan: continue schroeder per urology. monitor for improvement of 

renal status. 





Discussed plan of care with Dr. Giraldo


Problems:  





Subjective


24 Hr Interval Summary


Free Text/Dictation


No apparent signs of distress noted at this time.  Left lower extremity 

cellulitis on leg appears to be improving





Exam/Review of Systems


Vital Signs


Vitals





 Vital Signs








  Date Time  Temp Pulse Resp B/P Pulse Ox O2 Delivery O2 Flow Rate FiO2


 


12/6/17 08:08  65      


 


12/6/17 07:42 97.8  20 134/71 98   














 Intake and Output   


 


 12/5/17 12/5/17 12/6/17





 15:00 23:00 07:00


 


Intake Total  900 ml 1800 ml


 


Output Total  600 ml 2400 ml


 


Balance  300 ml -600 ml











Exam


Constitutional:  alert, oriented


Psych:  nl mood/affect


Head:  normocephalic


Eyes:  nl conjunctiva


Neck:  non-tender, supple


Respiratory:  clear to auscultation, normal air movement


Cardiovascular:  regular rate and rhythm


Gastrointestinal:  non-tender, soft


: schroeder in place


Musculoskeletal:  No swelling


Neurological:  CNS II-XII intact, nl mental status, nl speech


Skin: less redness LLE, improving





Results


Result Diagram:  


12/6/17 0717 12/6/17 0717





Results 24 hrs





Laboratory Tests








Test


  12/5/17


12:11 12/5/17


17:30 12/5/17


21:04 12/6/17


07:17


 


Bedside Glucose 137   133   119   


 


White Blood Count    7.9  


 


Red Blood Count    3.09  L


 


Hemoglobin    9.3  L


 


Hematocrit    25.8  L


 


Mean Corpuscular Volume    83.5  


 


Mean Corpuscular Hemoglobin    30.1  


 


Mean Corpuscular Hemoglobin


Concent 


  


  


  36.0  


 


 


Red Cell Distribution Width    12.0  


 


Platelet Count    147  


 


Mean Platelet Volume    12.4  H


 


Neutrophils %    61.6  


 


Lymphocytes %    22.4  


 


Monocytes %    8.9  


 


Eosinophils %    6.3  


 


Basophils %    0.5  


 


Nucleated Red Blood Cells %    0.0  


 


Neutrophils #    4.9  


 


Lymphocytes #    1.8  


 


Monocytes #    0.7  


 


Eosinophils #    0.5  


 


Basophils #    0.0  


 


Nucleated Red Blood Cells #    0.0  


 


Sodium Level    126  L


 


Potassium Level    4.5  


 


Chloride Level    97  


 


Carbon Dioxide Level    22  


 


Anion Gap    12  


 


Blood Urea Nitrogen    43  H


 


Creatinine    3.75  H


 


Glucose Level    102  


 


Calcium Level    8.3  L














Test


  12/6/17


08:28 


  


  


 


 


Bedside Glucose 115     











Medications


Medications





 Current Medications


Ondansetron HCl (Zofran Tab) 4 mg Q6H  PRN PO NAUSEA AND/OR VOMITING;  Start 12/ 1/17 at 23:00


Acetaminophen (Tylenol Tab) 650 mg Q6H  PRN PO PAIN LEVEL 1-3 OR FEVER;  Start 

12/1/17 at 23:00


Acetaminophen/ Hydrocodone Bitart (Norco (5/325)) 1 tab Q6H  PRN PO PAIN LEVEL 4

-6;  Start 12/1/17 at 23:00


Heparin Sodium (Porcine) (Heparin  (5000 Units/0.5 ml)) 5,000 unit Q8 SC  Last 

administered on 12/6/17at 05:46; Admin Dose 5,000 UNIT;  Start 12/1/17 at 23:00


Atorvastatin Calcium (Lipitor) 40 mg QHS PO  Last administered on 12/5/17at 21:

06; Admin Dose 40 MG;  Start 12/1/17 at 23:00


Carvedilol (Coreg) 12.5 mg BID PO  Last administered on 12/6/17at 09:10; Admin 

Dose 12.5 MG;  Start 12/1/17 at 23:00


Gabapentin (Neurontin) 600 mg TID PO  Last administered on 12/6/17at 09:09; 

Admin Dose 600 MG;  Start 12/2/17 at 09:00


Hydralazine HCl (Apresoline) 10 mg Q4H  PRN IV ELEVATED SYSTOLIC BP Last 

administered on 12/2/17at 08:41; Admin Dose 10 MG;  Start 12/1/17 at 23:00


Diagnostic Test (Pha) (Accu-Chek) 1 ea 02 XX  Last administered on 12/3/17at 02:

49; Admin Dose 1 EA;  Start 12/2/17 at 02:00


Miscellaneous Information 1 ea NOTE XX ;  Start 12/1/17 at 23:00


Glucose (Glutose) 15 gm Q15M  PRN PO DECREASED GLUCOSE;  Start 12/1/17 at 23:00


Glucose (Glutose) 22.5 gm Q15M  PRN PO DECREASED GLUCOSE;  Start 12/1/17 at 23:

00


Dextrose (D50w Syringe) 25 ml Q15M  PRN IV DECREASED GLUCOSE;  Start 12/1/17 at 

23:00


Dextrose (D50w Syringe) 50 ml Q15M  PRN IV DECREASED GLUCOSE;  Start 12/1/17 at 

23:00


Glucagon (Glucagen) 1 mg Q15M  PRN IM DECREASED GLUCOSE;  Start 12/1/17 at 23:00


Glucose (Glutose) 15 gm Q15M  PRN BUCCAL DECREASED GLUCOSE;  Start 12/1/17 at 23

:00


Fish Oil (Fish Oil) 1,000 mg BID PO  Last administered on 12/6/17at 09:11; 

Admin Dose 1,000 MG;  Start 12/2/17 at 09:00


Clindamycin HCl (Cleocin) 300 mg Q6 PO  Last administered on 12/6/17at 05:44; 

Admin Dose 300 MG;  Start 12/2/17 at 06:00;  Stop 12/12/17 at 05:59


Lactobacillus Acidophilus (Florajen3 Capsule) 1 each BID PO  Last administered 

on 12/6/17at 09:47; Admin Dose 1 EACH;  Start 12/2/17 at 09:00


Tamsulosin HCl (Flomax) 0.4 mg DAILY PO  Last administered on 12/6/17at 09:08; 

Admin Dose 0.4 MG;  Start 12/3/17 at 09:00


Terazosin HCl (Hytrin) 1 mg HS PO  Last administered on 12/5/17at 21:06; Admin 

Dose 1 MG;  Start 12/2/17 at 21:00


Nifedipine (Procardia Xl) 30 mg DAILY PO  Last administered on 12/6/17at 09:10; 

Admin Dose 30 MG;  Start 12/4/17 at 09:00


Silver Sulfadiazine 1 applic 1 applic BID TOP  Last administered on 12/6/17at 09

:11; Admin Dose 1 APPLIC;  Start 12/5/17 at 21:00


Sodium Chloride (NS) 1,000 ml @  80 mls/hr [id number] IV  Last administered on 12/5 /17at 17:42; Admin Dose 80 MLS/HR;  Start 12/5/17 at 17:00


Docusate Sodium (Colace) 100 mg BID  PRN PO Constipation Last administered on 12 /5/17at 21:08; Admin Dose 100 MG;  Start 12/5/17 at 18:00


Senna (Senokot) 1 tab BID  PRN PO Constipation Last administered on 12/5/17at 21

:08; Admin Dose 1 TAB;  Start 12/5/17 at 18:00











MARGIE WALTON Dec 6, 2017 09:53

## 2017-12-06 NOTE — CONS
Date/Time of Note


Date/Time of Note


DATE: 12/6/17 


TIME: 12:59





Assessment/Plan


Assessment/Plan


Chief Complaint/Hosp Course


SUBJECTIVE:  No acute changes.  The patient is alert, feels good. Looks 

comfortable.  No fevers.  


  


ANTIMICROBIALS:  Clindamycin.


 


PHYSICAL EXAMINATION:


GENERAL:  Well-developed, middle-aged  man who is alert, in no distress.


HEENT:  Head atraumatic, normocephalic.  Sclerae anicteric.  Buccal mucosa pink.


NECK:  Supple.


CHEST:  Rise symmetrical.  Breath sounds clear.


HEART:  S1, S2.


ABDOMEN:  Soft.  Bowel tones present.


EXTREMITIES:  Left lower extremity, resolving erythema.


 


ASSESSMENT:


1.  Left lower extremity cellulitis, improving


2.  Acute kidney injury, possibly on chronic kidney disease.


3.  BPH.


4.  Diabetes.


5.  Hypertension.


 


PLAN:  Patient remains stable. LLE looks better, continue abx, Silverdine 

topical, renal rec-s.





DW staff


Problems:  





Consultation Date/Type/Reason


Admit Date/Time


Dec 1, 2017 at 22:22


Initial Consult Date


12/2/17


Type of Consultation:  id


Referring Provider:  MARCOS BOYLE





Exam/Review of Systems


Vital Signs


Vitals





 Vital Signs








  Date Time  Temp Pulse Resp B/P Pulse Ox O2 Delivery O2 Flow Rate FiO2


 


12/6/17 12:16  73      


 


12/6/17 11:47 97.8  20 153/74 98   














 Intake and Output   


 


 12/5/17 12/5/17 12/6/17





 15:00 23:00 07:00


 


Intake Total  900 ml 1800 ml


 


Output Total  600 ml 2400 ml


 


Balance  300 ml -600 ml











Results


Result Diagram:  


12/6/17 0717                                                                   

             12/6/17 0717





Results 24 hrs





Laboratory Tests








Test


  12/5/17


17:30 12/5/17


21:04 12/6/17


07:17 12/6/17


08:28


 


Bedside Glucose 133   119    115  


 


White Blood Count   7.9   


 


Red Blood Count   3.09  L 


 


Hemoglobin   9.3  L 


 


Hematocrit   25.8  L 


 


Mean Corpuscular Volume   83.5   


 


Mean Corpuscular Hemoglobin   30.1   


 


Mean Corpuscular Hemoglobin


Concent 


  


  36.0  


  


 


 


Red Cell Distribution Width   12.0   


 


Platelet Count   147   


 


Mean Platelet Volume   12.4  H 


 


Neutrophils %   61.6   


 


Lymphocytes %   22.4   


 


Monocytes %   8.9   


 


Eosinophils %   6.3   


 


Basophils %   0.5   


 


Nucleated Red Blood Cells %   0.0   


 


Neutrophils #   4.9   


 


Lymphocytes #   1.8   


 


Monocytes #   0.7   


 


Eosinophils #   0.5   


 


Basophils #   0.0   


 


Nucleated Red Blood Cells #   0.0   


 


Sodium Level   126  L 


 


Potassium Level   4.5   


 


Chloride Level   97   


 


Carbon Dioxide Level   22   


 


Anion Gap   12   


 


Blood Urea Nitrogen   43  H 


 


Creatinine   3.75  H 


 


Glucose Level   102   


 


Calcium Level   8.3  L 














Test


  12/6/17


12:15 


  


  


 


 


Bedside Glucose 135     











Medications


Medications





 Current Medications


Ondansetron HCl (Zofran Tab) 4 mg Q6H  PRN PO NAUSEA AND/OR VOMITING;  Start 12/ 1/17 at 23:00


Acetaminophen (Tylenol Tab) 650 mg Q6H  PRN PO PAIN LEVEL 1-3 OR FEVER;  Start 

12/1/17 at 23:00


Acetaminophen/ Hydrocodone Bitart (Norco (5/325)) 1 tab Q6H  PRN PO PAIN LEVEL 4

-6;  Start 12/1/17 at 23:00


Heparin Sodium (Porcine) (Heparin  (5000 Units/0.5 ml)) 5,000 unit Q8 SC  Last 

administered on 12/6/17at 05:46; Admin Dose 5,000 UNIT;  Start 12/1/17 at 23:00


Atorvastatin Calcium (Lipitor) 40 mg QHS PO  Last administered on 12/5/17at 21:

06; Admin Dose 40 MG;  Start 12/1/17 at 23:00


Carvedilol (Coreg) 12.5 mg BID PO  Last administered on 12/6/17at 09:10; Admin 

Dose 12.5 MG;  Start 12/1/17 at 23:00


Gabapentin (Neurontin) 600 mg TID PO  Last administered on 12/6/17at 12:22; 

Admin Dose 600 MG;  Start 12/2/17 at 09:00


Hydralazine HCl (Apresoline) 10 mg Q4H  PRN IV ELEVATED SYSTOLIC BP Last 

administered on 12/2/17at 08:41; Admin Dose 10 MG;  Start 12/1/17 at 23:00


Diagnostic Test (Pha) (Accu-Chek) 1 ea 02 XX  Last administered on 12/3/17at 02:

49; Admin Dose 1 EA;  Start 12/2/17 at 02:00


Miscellaneous Information 1 ea NOTE XX ;  Start 12/1/17 at 23:00


Glucose (Glutose) 15 gm Q15M  PRN PO DECREASED GLUCOSE;  Start 12/1/17 at 23:00


Glucose (Glutose) 22.5 gm Q15M  PRN PO DECREASED GLUCOSE;  Start 12/1/17 at 23:

00


Dextrose (D50w Syringe) 25 ml Q15M  PRN IV DECREASED GLUCOSE;  Start 12/1/17 at 

23:00


Dextrose (D50w Syringe) 50 ml Q15M  PRN IV DECREASED GLUCOSE;  Start 12/1/17 at 

23:00


Glucagon (Glucagen) 1 mg Q15M  PRN IM DECREASED GLUCOSE;  Start 12/1/17 at 23:00


Glucose (Glutose) 15 gm Q15M  PRN BUCCAL DECREASED GLUCOSE;  Start 12/1/17 at 23

:00


Fish Oil (Fish Oil) 1,000 mg BID PO  Last administered on 12/6/17at 09:11; 

Admin Dose 1,000 MG;  Start 12/2/17 at 09:00


Clindamycin HCl (Cleocin) 300 mg Q6 PO  Last administered on 12/6/17at 12:22; 

Admin Dose 300 MG;  Start 12/2/17 at 06:00;  Stop 12/12/17 at 05:59


Lactobacillus Acidophilus (Florajen3 Capsule) 1 each BID PO  Last administered 

on 12/6/17at 09:47; Admin Dose 1 EACH;  Start 12/2/17 at 09:00


Tamsulosin HCl (Flomax) 0.4 mg DAILY PO  Last administered on 12/6/17at 09:08; 

Admin Dose 0.4 MG;  Start 12/3/17 at 09:00


Terazosin HCl (Hytrin) 1 mg HS PO  Last administered on 12/5/17at 21:06; Admin 

Dose 1 MG;  Start 12/2/17 at 21:00


Nifedipine (Procardia Xl) 30 mg DAILY PO  Last administered on 12/6/17at 09:10; 

Admin Dose 30 MG;  Start 12/4/17 at 09:00


Silver Sulfadiazine 1 applic 1 applic BID TOP  Last administered on 12/6/17at 09

:11; Admin Dose 1 APPLIC;  Start 12/5/17 at 21:00


Sodium Chloride (NS) 1,000 ml @  80 mls/hr [id number] IV  Last administered on 12/5 /17at 17:42; Admin Dose 80 MLS/HR;  Start 12/5/17 at 17:00


Docusate Sodium (Colace) 100 mg BID  PRN PO Constipation Last administered on 12 /5/17at 21:08; Admin Dose 100 MG;  Start 12/5/17 at 18:00


Senna (Senokot) 1 tab BID  PRN PO Constipation Last administered on 12/5/17at 21

:08; Admin Dose 1 TAB;  Start 12/5/17 at 18:00











YASMIN BOURNE NP Dec 6, 2017 13:00

## 2017-12-07 VITALS — RESPIRATION RATE: 20 BRPM | SYSTOLIC BLOOD PRESSURE: 122 MMHG | DIASTOLIC BLOOD PRESSURE: 76 MMHG

## 2017-12-07 VITALS — RESPIRATION RATE: 20 BRPM | DIASTOLIC BLOOD PRESSURE: 65 MMHG | SYSTOLIC BLOOD PRESSURE: 120 MMHG

## 2017-12-07 VITALS — HEART RATE: 85 BPM

## 2017-12-07 VITALS — RESPIRATION RATE: 20 BRPM | DIASTOLIC BLOOD PRESSURE: 69 MMHG | SYSTOLIC BLOOD PRESSURE: 132 MMHG

## 2017-12-07 VITALS — DIASTOLIC BLOOD PRESSURE: 69 MMHG | RESPIRATION RATE: 20 BRPM | SYSTOLIC BLOOD PRESSURE: 119 MMHG

## 2017-12-07 VITALS — HEART RATE: 72 BPM

## 2017-12-07 VITALS — DIASTOLIC BLOOD PRESSURE: 72 MMHG | SYSTOLIC BLOOD PRESSURE: 139 MMHG | RESPIRATION RATE: 16 BRPM

## 2017-12-07 VITALS — HEART RATE: 76 BPM

## 2017-12-07 VITALS — HEART RATE: 73 BPM

## 2017-12-07 VITALS — HEART RATE: 67 BPM

## 2017-12-07 VITALS — SYSTOLIC BLOOD PRESSURE: 119 MMHG | RESPIRATION RATE: 20 BRPM | DIASTOLIC BLOOD PRESSURE: 61 MMHG

## 2017-12-07 VITALS — HEART RATE: 64 BPM

## 2017-12-07 LAB
ALT SERPL-CCNC: 51 IU/L (ref 13–69)
ANION GAP SERPL CALC-SCNC: 12 MMOL/L (ref 8–16)
AST SERPL-CCNC: 70 IU/L (ref 15–46)
BASOPHILS # BLD AUTO: 0 10^3/UL (ref 0–0.1)
BASOPHILS NFR BLD: 0.4 % (ref 0–2)
BUN SERPL-MCNC: 45 MG/DL (ref 7–20)
CALCIUM SERPL-MCNC: 7.8 MG/DL (ref 8.4–10.2)
CHLORIDE SERPL-SCNC: 97 MMOL/L (ref 97–110)
CO2 SERPL-SCNC: 20 MMOL/L (ref 21–31)
CREAT SERPL-MCNC: 3.56 MG/DL (ref 0.61–1.24)
EOSINOPHIL # BLD: 0.4 10^3/UL (ref 0–0.5)
EOSINOPHIL NFR BLD: 6 % (ref 0–7)
ERYTHROCYTE [DISTWIDTH] IN BLOOD BY AUTOMATED COUNT: 12.1 % (ref 11.5–14.5)
GLUCOSE SERPL-MCNC: 101 MG/DL (ref 70–220)
HCT VFR BLD CALC: 25 % (ref 42–52)
HGB BLD-MCNC: 9.2 G/DL (ref 14–18)
LYMPHOCYTES # BLD AUTO: 1.9 10^3/UL (ref 0.8–2.9)
LYMPHOCYTES NFR BLD AUTO: 28 % (ref 15–51)
MCH RBC QN AUTO: 31 PG (ref 29–33)
MCHC RBC AUTO-ENTMCNC: 36.8 G/DL (ref 32–37)
MCV RBC AUTO: 84.2 FL (ref 82–101)
MONOCYTES # BLD: 0.7 10^3/UL (ref 0.3–0.9)
MONOCYTES NFR BLD: 9.6 % (ref 0–11)
NEUTROPHILS # BLD: 3.8 10^3/UL (ref 1.6–7.5)
NEUTROPHILS NFR BLD AUTO: 55.6 % (ref 39–77)
NRBC # BLD MANUAL: 0 10^3/UL (ref 0–0)
NRBC BLD AUTO-RTO: 0 /100WBC (ref 0–0)
PLATELET # BLD: 140 10^3/UL (ref 140–415)
PMV BLD AUTO: 12.7 FL (ref 7.4–10.4)
POTASSIUM SERPL-SCNC: 4.4 MMOL/L (ref 3.5–5.1)
RBC # BLD AUTO: 2.97 10^6/UL (ref 4.7–6.1)
SODIUM SERPL-SCNC: 120 MMOL/L (ref 135–144)
SODIUM SERPL-SCNC: 125 MMOL/L (ref 135–144)
WBC # BLD AUTO: 6.9 10^3/UL (ref 4.8–10.8)

## 2017-12-07 RX ADMIN — CLINDAMYCIN HYDROCHLORIDE SCH MG: 300 CAPSULE ORAL at 12:16

## 2017-12-07 RX ADMIN — HEPARIN SODIUM SCH UNIT: 5000 INJECTION, SOLUTION INTRAVENOUS; SUBCUTANEOUS at 22:01

## 2017-12-07 RX ADMIN — HEPARIN SODIUM SCH UNIT: 5000 INJECTION, SOLUTION INTRAVENOUS; SUBCUTANEOUS at 05:44

## 2017-12-07 RX ADMIN — NIFEDIPINE SCH MG: 60 TABLET, FILM COATED, EXTENDED RELEASE ORAL at 08:11

## 2017-12-07 RX ADMIN — GABAPENTIN SCH MG: 300 CAPSULE ORAL at 21:57

## 2017-12-07 RX ADMIN — DOXYCYCLINE HYCLATE SCH MG: 100 TABLET, FILM COATED ORAL at 21:58

## 2017-12-07 RX ADMIN — TERAZOSIN HYDROCHLORIDE ANHYDROUS SCH MG: 1 CAPSULE ORAL at 22:00

## 2017-12-07 RX ADMIN — Medication SCH EACH: at 08:12

## 2017-12-07 RX ADMIN — CLINDAMYCIN HYDROCHLORIDE SCH MG: 300 CAPSULE ORAL at 00:36

## 2017-12-07 RX ADMIN — GABAPENTIN SCH MG: 300 CAPSULE ORAL at 12:16

## 2017-12-07 RX ADMIN — ATORVASTATIN CALCIUM SCH MG: 40 TABLET, FILM COATED ORAL at 21:57

## 2017-12-07 RX ADMIN — HEPARIN SODIUM SCH UNIT: 5000 INJECTION, SOLUTION INTRAVENOUS; SUBCUTANEOUS at 15:14

## 2017-12-07 RX ADMIN — SILVER SULFADIAZINE SCH APPLIC: 10 CREAM TOPICAL at 21:58

## 2017-12-07 RX ADMIN — LEVOTHYROXINE SODIUM SCH MCG: 75 TABLET ORAL at 05:43

## 2017-12-07 RX ADMIN — FOLIC ACID SCH MLS/HR: 5 INJECTION, SOLUTION INTRAMUSCULAR; INTRAVENOUS; SUBCUTANEOUS at 22:06

## 2017-12-07 RX ADMIN — SILVER SULFADIAZINE SCH APPLIC: 10 CREAM TOPICAL at 08:12

## 2017-12-07 RX ADMIN — OMEGA-3 FATTY ACIDS CAP DELAYED RELEASE 1000 MG SCH MG: 1000 CAPSULE DELAYED RELEASE at 08:12

## 2017-12-07 RX ADMIN — TAMSULOSIN HYDROCHLORIDE SCH MG: 0.4 CAPSULE ORAL at 08:11

## 2017-12-07 RX ADMIN — OMEGA-3 FATTY ACIDS CAP DELAYED RELEASE 1000 MG SCH MG: 1000 CAPSULE DELAYED RELEASE at 21:58

## 2017-12-07 RX ADMIN — CLINDAMYCIN HYDROCHLORIDE SCH MG: 300 CAPSULE ORAL at 05:43

## 2017-12-07 RX ADMIN — GABAPENTIN SCH MG: 300 CAPSULE ORAL at 08:11

## 2017-12-07 RX ADMIN — Medication SCH EACH: at 21:58

## 2017-12-07 NOTE — CONS
Date/Time of Note


Date/Time of Note


DATE: 12/7/17 


TIME: 14:05





Assessment/Plan


Assessment/Plan


Chief Complaint/Hosp Course


SUBJECTIVE:  No acute changes.  The patient is alert, feels good. Looks 

comfortable.  No fevers.  


  


ANTIMICROBIALS:  Clindamycin.


 


PHYSICAL EXAMINATION:


GENERAL:  Well-developed, middle-aged  man who is alert, in no distress.


HEENT:  Head atraumatic, normocephalic.  Sclerae anicteric.  Buccal mucosa pink.


NECK:  Supple.


CHEST:  Rise symmetrical.  Breath sounds clear.


HEART:  S1, S2.


ABDOMEN:  Soft.  Bowel tones present.


EXTREMITIES:  Left lower extremity, resolving erythema.


 


ASSESSMENT:


1.  Left lower extremity cellulitis, improving


2.  Acute kidney injury, possibly on chronic kidney disease.


3.  BPH.


4.  Diabetes.


5.  Hypertension.


 


PLAN:  Patient remains stable. Will change Clindamycin to Doxycycline, continue 

topical Silverdine, f/u renal rec-s.





DW staff


Problems:  





Consultation Date/Type/Reason


Admit Date/Time


Dec 1, 2017 at 22:22


Initial Consult Date


12/2/17


Type of Consultation:  id


Referring Provider:  MARCOS BOYLE





Exam/Review of Systems


Vital Signs


Vitals





 Vital Signs








  Date Time  Temp Pulse Resp B/P Pulse Ox O2 Delivery O2 Flow Rate FiO2


 


12/7/17 12:17  64      


 


12/7/17 11:52 98.0  20 119/61 99   














 Intake and Output   


 


 12/6/17 12/6/17 12/7/17





 15:00 23:00 07:00


 


Intake Total  1200 ml 400 ml


 


Output Total  2400 ml 1400 ml


 


Balance  -1200 ml -1000 ml











Results


Result Diagram:  


12/7/17 0757                                                                   

             12/7/17 0757





Results 24 hrs





Laboratory Tests








Test


  12/6/17


17:15 12/6/17


21:12 12/6/17


23:30 12/7/17


07:57


 


Bedside Glucose 128   132    


 


Urine Osmolality   182  L 


 


Urine Random Sodium   39   


 


White Blood Count    6.9  


 


Red Blood Count    2.97  L


 


Hemoglobin    9.2  L


 


Hematocrit    25.0  L


 


Mean Corpuscular Volume    84.2  


 


Mean Corpuscular Hemoglobin    31.0  


 


Mean Corpuscular Hemoglobin


Concent 


  


  


  36.8  


 


 


Red Cell Distribution Width    12.1  


 


Platelet Count    140  


 


Mean Platelet Volume    12.7  H


 


Neutrophils %    55.6  


 


Lymphocytes %    28.0  


 


Monocytes %    9.6  


 


Eosinophils %    6.0  


 


Basophils %    0.4  


 


Nucleated Red Blood Cells %    0.0  


 


Neutrophils #    3.8  


 


Lymphocytes #    1.9  


 


Monocytes #    0.7  


 


Eosinophils #    0.4  


 


Basophils #    0.0  


 


Nucleated Red Blood Cells #    0.0  


 


Sodium Level    125  L


 


Potassium Level    4.4  


 


Chloride Level    97  


 


Carbon Dioxide Level    20  L


 


Anion Gap    12  


 


Blood Urea Nitrogen    45  H


 


Creatinine    3.56  H


 


Glucose Level    101  


 


Osmolality    274  L


 


Calcium Level    7.8  L














Test


  12/7/17


08:09 12/7/17


12:13 


  


 


 


Bedside Glucose 109   149    











Medications


Medications





 Current Medications


Ondansetron HCl (Zofran Tab) 4 mg Q6H  PRN PO NAUSEA AND/OR VOMITING;  Start 12/ 1/17 at 23:00


Acetaminophen (Tylenol Tab) 650 mg Q6H  PRN PO PAIN LEVEL 1-3 OR FEVER;  Start 

12/1/17 at 23:00


Acetaminophen/ Hydrocodone Bitart (Norco (5/325)) 1 tab Q6H  PRN PO PAIN LEVEL 4

-6;  Start 12/1/17 at 23:00


Heparin Sodium (Porcine) (Heparin  (5000 Units/0.5 ml)) 5,000 unit Q8 SC  Last 

administered on 12/7/17at 05:44; Admin Dose 5,000 UNIT;  Start 12/1/17 at 23:00


Atorvastatin Calcium (Lipitor) 40 mg QHS PO  Last administered on 12/6/17at 21:

13; Admin Dose 40 MG;  Start 12/1/17 at 23:00


Carvedilol (Coreg) 12.5 mg BID PO  Last administered on 12/7/17at 08:11; Admin 

Dose 12.5 MG;  Start 12/1/17 at 23:00


Gabapentin (Neurontin) 600 mg TID PO  Last administered on 12/7/17at 12:16; 

Admin Dose 600 MG;  Start 12/2/17 at 09:00


Hydralazine HCl (Apresoline) 10 mg Q4H  PRN IV ELEVATED SYSTOLIC BP Last 

administered on 12/2/17at 08:41; Admin Dose 10 MG;  Start 12/1/17 at 23:00


Diagnostic Test (Pha) (Accu-Chek) 1 ea 02 XX  Last administered on 12/3/17at 02:

49; Admin Dose 1 EA;  Start 12/2/17 at 02:00


Miscellaneous Information 1 ea NOTE XX ;  Start 12/1/17 at 23:00


Glucose (Glutose) 15 gm Q15M  PRN PO DECREASED GLUCOSE;  Start 12/1/17 at 23:00


Glucose (Glutose) 22.5 gm Q15M  PRN PO DECREASED GLUCOSE;  Start 12/1/17 at 23:

00


Dextrose (D50w Syringe) 25 ml Q15M  PRN IV DECREASED GLUCOSE;  Start 12/1/17 at 

23:00


Dextrose (D50w Syringe) 50 ml Q15M  PRN IV DECREASED GLUCOSE;  Start 12/1/17 at 

23:00


Glucagon (Glucagen) 1 mg Q15M  PRN IM DECREASED GLUCOSE;  Start 12/1/17 at 23:00


Glucose (Glutose) 15 gm Q15M  PRN BUCCAL DECREASED GLUCOSE;  Start 12/1/17 at 23

:00


Fish Oil (Fish Oil) 1,000 mg BID PO  Last administered on 12/7/17at 08:12; 

Admin Dose 1,000 MG;  Start 12/2/17 at 09:00


Clindamycin HCl (Cleocin) 300 mg Q6 PO  Last administered on 12/7/17at 12:16; 

Admin Dose 300 MG;  Start 12/2/17 at 06:00;  Stop 12/12/17 at 05:59


Lactobacillus Acidophilus (Florajen3 Capsule) 1 each BID PO  Last administered 

on 12/7/17at 08:12; Admin Dose 1 EACH;  Start 12/2/17 at 09:00


Tamsulosin HCl (Flomax) 0.4 mg DAILY PO  Last administered on 12/7/17at 08:11; 

Admin Dose 0.4 MG;  Start 12/3/17 at 09:00


Terazosin HCl (Hytrin) 1 mg HS PO  Last administered on 12/6/17at 21:13; Admin 

Dose 1 MG;  Start 12/2/17 at 21:00


Nifedipine (Procardia Xl) 30 mg DAILY PO  Last administered on 12/7/17at 08:11; 

Admin Dose 30 MG;  Start 12/4/17 at 09:00


Silver Sulfadiazine (Thermazene 1% 25 Gm) 1 applic BID TOP  Last administered 

on 12/7/17at 08:12; Admin Dose 1 APPLIC;  Start 12/5/17 at 21:00


Docusate Sodium (Colace) 100 mg BID  PRN PO Constipation Last administered on 12

/5/17at 21:08; Admin Dose 100 MG;  Start 12/5/17 at 18:00


Senna (Senokot) 1 tab BID  PRN PO Constipation Last administered on 12/5/17at 21

:08; Admin Dose 1 TAB;  Start 12/5/17 at 18:00











YASMIN BOURNE NP Dec 7, 2017 14:06

## 2017-12-07 NOTE — PN
Date/Time of Note


Date/Time of Note


DATE: 12/7/17 


TIME: 09:16





Assessment/Plan


VTE Prophylaxis


VTE Prophylaxis Intervention:  heparin





Lines/Catheters


IV Catheter Type (from [facility]):  Peripheral IV


Urinary Cath still in place:  Yes





Assessment/Plan


Chief Complaint/Hosp Course


Assessment and plan





1.  Acute kidney injury..  Nephrologist following.  Medications to be renally 

dosed.  Monitor for improvement.  Continue with nephrologist 

recommendations.Patient did have renal ultrasound showing moderate enlarged 

prostate with impression on posterior inferior bladder with bladder distention.

  f/u urologist recs 





2.  Left lower extremity cellulitis.  Continue on antibiotics per ID 

recommendations. improving





3.  BPH.  Continue Flomax and terazosin.  Schroeder catheter in place.  continue 

with urologist recs 





4.  Normocytic normochromic anemia.  Noted with iron deficiency.  Continue iron 

supplement.





5.  Hypertension.  Continue antihypertensives and adjust needed.stable





6.  Diabetes.  Continue insulin regimen.  A1c noted at 6.3. stable





7.  Hypothyroidism.  Continue Synthroid





8.  Dyslipidemia.  Continue on statin medication





9.  Diabetic neuropathy.  Continue on Neurontin





10. Hyponatremia. monitor trend. IVF per nephrology. 





Disposition and plan:d/c schroeder per urology. Awaiting AM labs. Will follow up 

renal panel. d/c when medically stable and cleared by consultants 





Discussed plan of care with Dr. Giraldo


Problems:  





Subjective


24 Hr Interval Summary


Free Text/Dictation


No signs or symptoms of distress.  No other specific complaints.





Exam/Review of Systems


Vital Signs


Vitals





 Vital Signs








  Date Time  Temp Pulse Resp B/P Pulse Ox O2 Delivery O2 Flow Rate FiO2


 


12/7/17 08:02  72      


 


12/7/17 07:46 98.5  20 119/69 96   














 Intake and Output   


 


 12/6/17 12/6/17 12/7/17





 15:00 23:00 07:00


 


Intake Total  1200 ml 400 ml


 


Output Total  2400 ml 1400 ml


 


Balance  -1200 ml -1000 ml











Exam


Constitutional:  alert, oriented


Psych:  nl mood/affect


Head:  normocephalic


Eyes:  nl conjunctiva


Neck:  non-tender, supple


Respiratory:  clear to auscultation, normal air movement


Cardiovascular:  regular rate and rhythm


Gastrointestinal:  non-tender, soft


: schroeder in place


Musculoskeletal:  No swelling


Neurological:  CNS II-XII intact, nl mental status, nl speech


Skin: less redness LLE, improving





Results


Result Diagram:  


12/7/17 0757                                                                   

             12/6/17 0717





Results 24 hrs





Laboratory Tests








Test


  12/6/17


12:15 12/6/17


17:15 12/6/17


21:12 12/6/17


23:30


 


Bedside Glucose 135   128   132   


 


Urine Osmolality    182  L


 


Urine Random Sodium    39  














Test


  12/7/17


07:57 12/7/17


08:09 


  


 


 


White Blood Count 6.9     


 


Red Blood Count 2.97  L   


 


Hemoglobin 9.2  L   


 


Hematocrit 25.0  L   


 


Mean Corpuscular Volume 84.2     


 


Mean Corpuscular Hemoglobin 31.0     


 


Mean Corpuscular Hemoglobin


Concent 36.8  


  


  


  


 


 


Red Cell Distribution Width 12.1     


 


Platelet Count 140     


 


Mean Platelet Volume 12.7  H   


 


Neutrophils % 55.6     


 


Lymphocytes % 28.0     


 


Monocytes % 9.6     


 


Eosinophils % 6.0     


 


Basophils % 0.4     


 


Nucleated Red Blood Cells % 0.0     


 


Neutrophils # 3.8     


 


Lymphocytes # 1.9     


 


Monocytes # 0.7     


 


Eosinophils # 0.4     


 


Basophils # 0.0     


 


Nucleated Red Blood Cells # 0.0     


 


Bedside Glucose  109    











Medications


Medications





 Current Medications


Ondansetron HCl (Zofran Tab) 4 mg Q6H  PRN PO NAUSEA AND/OR VOMITING;  Start 12/ 1/17 at 23:00


Acetaminophen (Tylenol Tab) 650 mg Q6H  PRN PO PAIN LEVEL 1-3 OR FEVER;  Start 

12/1/17 at 23:00


Acetaminophen/ Hydrocodone Bitart (Norco (5/325)) 1 tab Q6H  PRN PO PAIN LEVEL 4

-6;  Start 12/1/17 at 23:00


Heparin Sodium (Porcine) (Heparin  (5000 Units/0.5 ml)) 5,000 unit Q8 SC  Last 

administered on 12/7/17at 05:44; Admin Dose 5,000 UNIT;  Start 12/1/17 at 23:00


Atorvastatin Calcium (Lipitor) 40 mg QHS PO  Last administered on 12/6/17at 21:

13; Admin Dose 40 MG;  Start 12/1/17 at 23:00


Carvedilol (Coreg) 12.5 mg BID PO  Last administered on 12/7/17at 08:11; Admin 

Dose 12.5 MG;  Start 12/1/17 at 23:00


Gabapentin (Neurontin) 600 mg TID PO  Last administered on 12/7/17at 08:11; 

Admin Dose 600 MG;  Start 12/2/17 at 09:00


Hydralazine HCl (Apresoline) 10 mg Q4H  PRN IV ELEVATED SYSTOLIC BP Last 

administered on 12/2/17at 08:41; Admin Dose 10 MG;  Start 12/1/17 at 23:00


Diagnostic Test (Pha) (Accu-Chek) 1 ea 02 XX  Last administered on 12/3/17at 02:

49; Admin Dose 1 EA;  Start 12/2/17 at 02:00


Miscellaneous Information 1 ea NOTE XX ;  Start 12/1/17 at 23:00


Glucose (Glutose) 15 gm Q15M  PRN PO DECREASED GLUCOSE;  Start 12/1/17 at 23:00


Glucose (Glutose) 22.5 gm Q15M  PRN PO DECREASED GLUCOSE;  Start 12/1/17 at 23:

00


Dextrose (D50w Syringe) 25 ml Q15M  PRN IV DECREASED GLUCOSE;  Start 12/1/17 at 

23:00


Dextrose (D50w Syringe) 50 ml Q15M  PRN IV DECREASED GLUCOSE;  Start 12/1/17 at 

23:00


Glucagon (Glucagen) 1 mg Q15M  PRN IM DECREASED GLUCOSE;  Start 12/1/17 at 23:00


Glucose (Glutose) 15 gm Q15M  PRN BUCCAL DECREASED GLUCOSE;  Start 12/1/17 at 23

:00


Fish Oil (Fish Oil) 1,000 mg BID PO  Last administered on 12/7/17at 08:12; 

Admin Dose 1,000 MG;  Start 12/2/17 at 09:00


Clindamycin HCl (Cleocin) 300 mg Q6 PO  Last administered on 12/7/17at 05:43; 

Admin Dose 300 MG;  Start 12/2/17 at 06:00;  Stop 12/12/17 at 05:59


Lactobacillus Acidophilus (Florajen3 Capsule) 1 each BID PO  Last administered 

on 12/7/17at 08:12; Admin Dose 1 EACH;  Start 12/2/17 at 09:00


Tamsulosin HCl (Flomax) 0.4 mg DAILY PO  Last administered on 12/7/17at 08:11; 

Admin Dose 0.4 MG;  Start 12/3/17 at 09:00


Terazosin HCl (Hytrin) 1 mg HS PO  Last administered on 12/6/17at 21:13; Admin 

Dose 1 MG;  Start 12/2/17 at 21:00


Nifedipine (Procardia Xl) 30 mg DAILY PO  Last administered on 12/7/17at 08:11; 

Admin Dose 30 MG;  Start 12/4/17 at 09:00


Silver Sulfadiazine (Thermazene 1% 25 Gm) 1 applic BID TOP  Last administered 

on 12/7/17at 08:12; Admin Dose 1 APPLIC;  Start 12/5/17 at 21:00


Docusate Sodium (Colace) 100 mg BID  PRN PO Constipation Last administered on 12 /5/17at 21:08; Admin Dose 100 MG;  Start 12/5/17 at 18:00


Senna (Senokot) 1 tab BID  PRN PO Constipation Last administered on 12/5/17at 21

:08; Admin Dose 1 TAB;  Start 12/5/17 at 18:00











MARGIE WALTON Dec 7, 2017 09:19

## 2017-12-07 NOTE — CONS
Date/Time of Note


Date/Time of Note


DATE: 12/7/17 


TIME: 17:25





Assessment/Plan


Assessment/Plan


Additional Assessment/Plan


1. Non Oliguric Acute kidney injury vs acute kidney injury on CKD 


2. BPH with urinary retention s/p Schroeder catheter placement 


4. H/o possible CKD due to DM nephropathy, pt is not aware about it or has not 

seen any nephrologist as outpatient, I doubt it 


5. DM II


6. HTN


7. Anemia combinatino of iron deficiency anemia and anemia of chronic renal 

disease 


8. LE significant edmea, US negative for DVT, ECHO showed EF 60% with stage I 

diastoilc dysfunction 


9. Accelerated HTN





Plan:


BUN/Cr still remains elevated, , Na dropped to 125 dsepite IVF 1 liter, will 

give tolvaptan 15mg pO x 1 dose now 


Bp stable with  Procardia Xl to 30mg po daily and use IV hydralazine prn SBP> 

150 mm Hg 


we will continue to follow





Consultation Date/Type/Reason


Admit Date/Time


Dec 1, 2017 at 22:22


Initial Consult Date


12/2/17


Type of Consultation:  NEPHROLOGY


Referring Provider:  MARCOS BOYLE





24 HR Interval Summary


Free Text/Dictation


Na dropped to 125, BP stable





Exam/Review of Systems


Vital Signs


Vitals





 Vital Signs








  Date Time  Temp Pulse Resp B/P Pulse Ox O2 Delivery O2 Flow Rate FiO2


 


12/7/17 16:09  67      


 


12/7/17 15:41 97.9  20 122/76 95   














 Intake and Output   


 


 12/6/17 12/6/17 12/7/17





 15:00 23:00 07:00


 


Intake Total  1200 ml 400 ml


 


Output Total  2400 ml 1400 ml


 


Balance  -1200 ml -1000 ml











Exam


Constitutional:  alert


Respiratory:  clear to auscultation, diminished breath sounds, normal air 

movement


Cardiovascular:  nl pulses, regular rate and rhythm


Gastrointestinal:  nl liver, spleen, non-tender, soft, + schroeder catheter 


Musculoskeletal:  nl extremities to inspection


Extremities:  normal pulses


Neurological:  CNS II-XII intact, nl mental status, nl speech, nl strength





Results


Result Diagram:  


12/7/17 0757                                                                   

             12/7/17 0757





Results 24 hrs





Laboratory Tests








Test


  12/6/17


21:12 12/6/17


23:30 12/7/17


07:57 12/7/17


08:09


 


Bedside Glucose 132     109  


 


Urine Osmolality  182  L  


 


Urine Random Sodium  39    


 


White Blood Count   6.9   


 


Red Blood Count   2.97  L 


 


Hemoglobin   9.2  L 


 


Hematocrit   25.0  L 


 


Mean Corpuscular Volume   84.2   


 


Mean Corpuscular Hemoglobin   31.0   


 


Mean Corpuscular Hemoglobin


Concent 


  


  36.8  


  


 


 


Red Cell Distribution Width   12.1   


 


Platelet Count   140   


 


Mean Platelet Volume   12.7  H 


 


Neutrophils %   55.6   


 


Lymphocytes %   28.0   


 


Monocytes %   9.6   


 


Eosinophils %   6.0   


 


Basophils %   0.4   


 


Nucleated Red Blood Cells %   0.0   


 


Neutrophils #   3.8   


 


Lymphocytes #   1.9   


 


Monocytes #   0.7   


 


Eosinophils #   0.4   


 


Basophils #   0.0   


 


Nucleated Red Blood Cells #   0.0   


 


Sodium Level   125  L 


 


Potassium Level   4.4   


 


Chloride Level   97   


 


Carbon Dioxide Level   20  L 


 


Anion Gap   12   


 


Blood Urea Nitrogen   45  H 


 


Creatinine   3.56  H 


 


Glucose Level   101   


 


Osmolality   274  L 


 


Calcium Level   7.8  L 














Test


  12/7/17


12:13 12/7/17


17:12 


  


 


 


Bedside Glucose 149   138    











Medications


Medications





 Current Medications


Ondansetron HCl (Zofran Tab) 4 mg Q6H  PRN PO NAUSEA AND/OR VOMITING;  Start 12/ 1/17 at 23:00


Acetaminophen (Tylenol Tab) 650 mg Q6H  PRN PO PAIN LEVEL 1-3 OR FEVER;  Start 

12/1/17 at 23:00


Acetaminophen/ Hydrocodone Bitart (Norco (5/325)) 1 tab Q6H  PRN PO PAIN LEVEL 4

-6;  Start 12/1/17 at 23:00


Heparin Sodium (Porcine) (Heparin  (5000 Units/0.5 ml)) 5,000 unit Q8 SC  Last 

administered on 12/7/17at 15:14; Admin Dose 5,000 UNIT;  Start 12/1/17 at 23:00


Atorvastatin Calcium (Lipitor) 40 mg QHS PO  Last administered on 12/6/17at 21:

13; Admin Dose 40 MG;  Start 12/1/17 at 23:00


Carvedilol (Coreg) 12.5 mg BID PO  Last administered on 12/7/17at 08:11; Admin 

Dose 12.5 MG;  Start 12/1/17 at 23:00


Gabapentin (Neurontin) 600 mg TID PO  Last administered on 12/7/17at 12:16; 

Admin Dose 600 MG;  Start 12/2/17 at 09:00


Hydralazine HCl (Apresoline) 10 mg Q4H  PRN IV ELEVATED SYSTOLIC BP Last 

administered on 12/2/17at 08:41; Admin Dose 10 MG;  Start 12/1/17 at 23:00


Diagnostic Test (Pha) (Accu-Chek) 1 ea 02 XX  Last administered on 12/3/17at 02:

49; Admin Dose 1 EA;  Start 12/2/17 at 02:00


Miscellaneous Information 1 ea NOTE XX ;  Start 12/1/17 at 23:00


Glucose (Glutose) 15 gm Q15M  PRN PO DECREASED GLUCOSE;  Start 12/1/17 at 23:00


Glucose (Glutose) 22.5 gm Q15M  PRN PO DECREASED GLUCOSE;  Start 12/1/17 at 23:

00


Dextrose (D50w Syringe) 25 ml Q15M  PRN IV DECREASED GLUCOSE;  Start 12/1/17 at 

23:00


Dextrose (D50w Syringe) 50 ml Q15M  PRN IV DECREASED GLUCOSE;  Start 12/1/17 at 

23:00


Glucagon (Glucagen) 1 mg Q15M  PRN IM DECREASED GLUCOSE;  Start 12/1/17 at 23:00


Glucose (Glutose) 15 gm Q15M  PRN BUCCAL DECREASED GLUCOSE;  Start 12/1/17 at 23

:00


Fish Oil (Fish Oil) 1,000 mg BID PO  Last administered on 12/7/17at 08:12; 

Admin Dose 1,000 MG;  Start 12/2/17 at 09:00


Lactobacillus Acidophilus (Florajen3 Capsule) 1 each BID PO  Last administered 

on 12/7/17at 08:12; Admin Dose 1 EACH;  Start 12/2/17 at 09:00


Tamsulosin HCl (Flomax) 0.4 mg DAILY PO  Last administered on 12/7/17at 08:11; 

Admin Dose 0.4 MG;  Start 12/3/17 at 09:00


Terazosin HCl (Hytrin) 1 mg HS PO  Last administered on 12/6/17at 21:13; Admin 

Dose 1 MG;  Start 12/2/17 at 21:00


Nifedipine (Procardia Xl) 30 mg DAILY PO  Last administered on 12/7/17at 08:11; 

Admin Dose 30 MG;  Start 12/4/17 at 09:00


Silver Sulfadiazine (Thermazene 1% 25 Gm) 1 applic BID TOP  Last administered 

on 12/7/17at 08:12; Admin Dose 1 APPLIC;  Start 12/5/17 at 21:00


Docusate Sodium (Colace) 100 mg BID  PRN PO Constipation Last administered on 12 /5/17at 21:08; Admin Dose 100 MG;  Start 12/5/17 at 18:00


Senna (Senokot) 1 tab BID  PRN PO Constipation Last administered on 12/5/17at 21

:08; Admin Dose 1 TAB;  Start 12/5/17 at 18:00


Doxycycline Hyclate (Vibramycin) 100 mg BID PO ;  Start 12/7/17 at 21:00











SANTIAGO MADISON MD Dec 7, 2017 17:27

## 2017-12-08 VITALS — DIASTOLIC BLOOD PRESSURE: 75 MMHG | SYSTOLIC BLOOD PRESSURE: 135 MMHG | RESPIRATION RATE: 16 BRPM

## 2017-12-08 VITALS — RESPIRATION RATE: 18 BRPM | DIASTOLIC BLOOD PRESSURE: 71 MMHG | SYSTOLIC BLOOD PRESSURE: 139 MMHG

## 2017-12-08 VITALS — HEART RATE: 74 BPM

## 2017-12-08 VITALS — DIASTOLIC BLOOD PRESSURE: 70 MMHG | SYSTOLIC BLOOD PRESSURE: 126 MMHG | RESPIRATION RATE: 18 BRPM

## 2017-12-08 VITALS — SYSTOLIC BLOOD PRESSURE: 155 MMHG | DIASTOLIC BLOOD PRESSURE: 73 MMHG | RESPIRATION RATE: 18 BRPM

## 2017-12-08 VITALS — RESPIRATION RATE: 20 BRPM | SYSTOLIC BLOOD PRESSURE: 144 MMHG | DIASTOLIC BLOOD PRESSURE: 69 MMHG

## 2017-12-08 VITALS — HEART RATE: 75 BPM

## 2017-12-08 VITALS — HEART RATE: 73 BPM

## 2017-12-08 VITALS — SYSTOLIC BLOOD PRESSURE: 139 MMHG | DIASTOLIC BLOOD PRESSURE: 74 MMHG | RESPIRATION RATE: 20 BRPM

## 2017-12-08 VITALS — HEART RATE: 76 BPM

## 2017-12-08 VITALS — HEART RATE: 66 BPM

## 2017-12-08 LAB
ANION GAP SERPL CALC-SCNC: 12 MMOL/L (ref 8–16)
BASOPHILS # BLD AUTO: 0 10^3/UL (ref 0–0.1)
BASOPHILS NFR BLD: 0.6 % (ref 0–2)
BUN SERPL-MCNC: 42 MG/DL (ref 7–20)
CALCIUM SERPL-MCNC: 8.2 MG/DL (ref 8.4–10.2)
CHLORIDE SERPL-SCNC: 101 MMOL/L (ref 97–110)
CO2 SERPL-SCNC: 18 MMOL/L (ref 21–31)
CREAT SERPL-MCNC: 3.37 MG/DL (ref 0.61–1.24)
EOSINOPHIL # BLD: 0.3 10^3/UL (ref 0–0.5)
EOSINOPHIL NFR BLD: 4.6 % (ref 0–7)
ERYTHROCYTE [DISTWIDTH] IN BLOOD BY AUTOMATED COUNT: 12.1 % (ref 11.5–14.5)
GLUCOSE SERPL-MCNC: 98 MG/DL (ref 70–220)
HCT VFR BLD CALC: 24.4 % (ref 42–52)
HGB BLD-MCNC: 9 G/DL (ref 14–18)
LYMPHOCYTES # BLD AUTO: 1.8 10^3/UL (ref 0.8–2.9)
LYMPHOCYTES NFR BLD AUTO: 24.7 % (ref 15–51)
MCH RBC QN AUTO: 30.8 PG (ref 29–33)
MCHC RBC AUTO-ENTMCNC: 36.9 G/DL (ref 32–37)
MCV RBC AUTO: 83.6 FL (ref 82–101)
MONOCYTES # BLD: 0.8 10^3/UL (ref 0.3–0.9)
MONOCYTES NFR BLD: 10.8 % (ref 0–11)
NEUTROPHILS # BLD: 4.2 10^3/UL (ref 1.6–7.5)
NEUTROPHILS NFR BLD AUTO: 58.9 % (ref 39–77)
NRBC # BLD MANUAL: 0 10^3/UL (ref 0–0)
NRBC BLD AUTO-RTO: 0 /100WBC (ref 0–0)
PLATELET # BLD: 139 10^3/UL (ref 140–415)
PMV BLD AUTO: 12.5 FL (ref 7.4–10.4)
POTASSIUM SERPL-SCNC: 4.4 MMOL/L (ref 3.5–5.1)
RBC # BLD AUTO: 2.92 10^6/UL (ref 4.7–6.1)
SODIUM SERPL-SCNC: 127 MMOL/L (ref 135–144)
WBC # BLD AUTO: 7.1 10^3/UL (ref 4.8–10.8)

## 2017-12-08 RX ADMIN — SODIUM CITRATE AND CITRIC ACID MONOHYDRATE SCH ML: 500; 334 SOLUTION ORAL at 20:56

## 2017-12-08 RX ADMIN — Medication SCH EACH: at 09:09

## 2017-12-08 RX ADMIN — TERAZOSIN HYDROCHLORIDE ANHYDROUS SCH MG: 1 CAPSULE ORAL at 20:57

## 2017-12-08 RX ADMIN — SILVER SULFADIAZINE SCH APPLIC: 10 CREAM TOPICAL at 21:03

## 2017-12-08 RX ADMIN — HEPARIN SODIUM SCH UNIT: 5000 INJECTION, SOLUTION INTRAVENOUS; SUBCUTANEOUS at 06:19

## 2017-12-08 RX ADMIN — OMEGA-3 FATTY ACIDS CAP DELAYED RELEASE 1000 MG SCH MG: 1000 CAPSULE DELAYED RELEASE at 09:09

## 2017-12-08 RX ADMIN — Medication SCH EACH: at 21:00

## 2017-12-08 RX ADMIN — GABAPENTIN SCH MG: 300 CAPSULE ORAL at 13:05

## 2017-12-08 RX ADMIN — NIFEDIPINE SCH MG: 60 TABLET, FILM COATED, EXTENDED RELEASE ORAL at 09:09

## 2017-12-08 RX ADMIN — TAMSULOSIN HYDROCHLORIDE SCH MG: 0.4 CAPSULE ORAL at 09:09

## 2017-12-08 RX ADMIN — FOLIC ACID SCH MLS/HR: 5 INJECTION, SOLUTION INTRAMUSCULAR; INTRAVENOUS; SUBCUTANEOUS at 05:42

## 2017-12-08 RX ADMIN — FOLIC ACID SCH MLS/HR: 5 INJECTION, SOLUTION INTRAMUSCULAR; INTRAVENOUS; SUBCUTANEOUS at 16:35

## 2017-12-08 RX ADMIN — HEPARIN SODIUM SCH UNIT: 5000 INJECTION, SOLUTION INTRAVENOUS; SUBCUTANEOUS at 21:09

## 2017-12-08 RX ADMIN — DOXYCYCLINE HYCLATE SCH MG: 100 TABLET, FILM COATED ORAL at 20:58

## 2017-12-08 RX ADMIN — ATORVASTATIN CALCIUM SCH MG: 40 TABLET, FILM COATED ORAL at 20:58

## 2017-12-08 RX ADMIN — LEVOTHYROXINE SODIUM SCH MCG: 75 TABLET ORAL at 06:18

## 2017-12-08 RX ADMIN — DOXYCYCLINE HYCLATE SCH MG: 100 TABLET, FILM COATED ORAL at 09:09

## 2017-12-08 RX ADMIN — GABAPENTIN SCH MG: 300 CAPSULE ORAL at 09:13

## 2017-12-08 RX ADMIN — OMEGA-3 FATTY ACIDS CAP DELAYED RELEASE 1000 MG SCH MG: 1000 CAPSULE DELAYED RELEASE at 20:58

## 2017-12-08 RX ADMIN — HEPARIN SODIUM SCH UNIT: 5000 INJECTION, SOLUTION INTRAVENOUS; SUBCUTANEOUS at 13:05

## 2017-12-08 RX ADMIN — GABAPENTIN SCH MG: 300 CAPSULE ORAL at 20:58

## 2017-12-08 RX ADMIN — SILVER SULFADIAZINE SCH APPLIC: 10 CREAM TOPICAL at 09:09

## 2017-12-08 NOTE — PN
Date/Time of Note


Date/Time of Note


DATE: 12/8/17 


TIME: 17:14





Assessment/Plan


VTE Prophylaxis


VTE Prophylaxis Intervention:  heparin





Lines/Catheters


IV Catheter Type (from Nrsg):  Peripheral IV


Urinary Cath still in place:  Yes


Reason Cath still needed:  other (indicate)





Assessment/Plan


Assessment/Plan


1. Left lower extremity cellulitis, improving, on doxycycline


2. Acute on chronic renal failure, follow up with BMP


3. Hyponatremia, improving


4. Diabetes, on ISS only


5. Hypertension, controlled


6. Hypothyroidism, on synthroid


7. Normocytic anemia, likely CKD related, follow up with H/H


8.  BPH, stable, on hytrin


8. DVT prophylaxis: heparin





Subjective


24 Hr Interval Summary


Free Text/Dictation


afebrile





Exam/Review of Systems


Vital Signs


Vitals





 Vital Signs








  Date Time  Temp Pulse Resp B/P Pulse Ox O2 Delivery O2 Flow Rate FiO2


 


12/8/17 16:36 98.0 69 18 126/70 98   














 Intake and Output   


 


 12/7/17 12/7/17 12/8/17





 15:00 23:00 07:00


 


Intake Total  1200 ml 560 ml


 


Output Total  800 ml 700 ml


 


Balance  400 ml -140 ml











Exam


Constitutional:  alert, oriented, well developed


Psych:  nl mood/affect, no complaints


Head:  atraumatic, normocephalic


Eyes:  EOMI, PERRL, nl conjunctiva, nl lids


ENMT:  nl external ears & nose, nl lips & teeth, nl nasal mucosa & septum


Neck:  non-tender, supple


Respiratory:  clear to auscultation, normal air movement, 


   No congested cough, No crackles/rales, No diminished breath sounds, No 

intercostal retraction, No labored breathing, No other, No respirations, No 

tactile fremitus, No wheezing


Cardiovascular:  nl pulses, regular rate and rhythm, 


   No S3, No S4, No bruits, No diastolic murmur, No edema, No gallop, No 

irregular rhythm, No jugular venous distention (JVD), No murmurs/extra sounds, 

No other, No rub, No systolic murmur


Gastrointestinal:  nl liver, spleen, non-tender, soft


Musculoskeletal:  nl extremities to inspection


Extremities:  other (left lower extremity skin lesion with redness and some 

swelling. )


Neurological:  CNS II-XII intact, nl speech, nl strength





Results


Result Diagram:  


12/8/17 0733                                                                   

             12/8/17 0733





Results 24 hrs





Laboratory Tests








Test


  12/7/17


17:41 12/7/17


22:20 12/8/17


00:52 12/8/17


07:33


 


Sodium Level 120  L  123  L 127  L


 


Aspartate Amino Transf


(AST/SGOT) 70  H


  


  


  


 


 


Alanine Aminotransferase


(ALT/SGPT) 51  


  


  


  


 


 


Bedside Glucose  130    


 


White Blood Count    7.1  


 


Red Blood Count    2.92  L


 


Hemoglobin    9.0  L


 


Hematocrit    24.4  L


 


Mean Corpuscular Volume    83.6  


 


Mean Corpuscular Hemoglobin    30.8  


 


Mean Corpuscular Hemoglobin


Concent 


  


  


  36.9  


 


 


Red Cell Distribution Width    12.1  


 


Platelet Count    139  L


 


Mean Platelet Volume    12.5  H


 


Neutrophils %    58.9  


 


Lymphocytes %    24.7  


 


Monocytes %    10.8  


 


Eosinophils %    4.6  


 


Basophils %    0.6  


 


Nucleated Red Blood Cells %    0.0  


 


Neutrophils #    4.2  


 


Lymphocytes #    1.8  


 


Monocytes #    0.8  


 


Eosinophils #    0.3  


 


Basophils #    0.0  


 


Nucleated Red Blood Cells #    0.0  


 


Potassium Level    4.4  


 


Chloride Level    101  


 


Carbon Dioxide Level    18  L


 


Anion Gap    12  


 


Blood Urea Nitrogen    42  H


 


Creatinine    3.37  H


 


Glucose Level    98  


 


Calcium Level    8.2  L














Test


  12/8/17


07:58 12/8/17


11:56 


  


 


 


Bedside Glucose 107   141    











Medications


Medications





 Current Medications


Ondansetron HCl (Zofran Tab) 4 mg Q6H  PRN PO NAUSEA AND/OR VOMITING;  Start 12/ 1/17 at 23:00


Acetaminophen (Tylenol Tab) 650 mg Q6H  PRN PO PAIN LEVEL 1-3 OR FEVER;  Start 

12/1/17 at 23:00


Acetaminophen/ Hydrocodone Bitart (Norco (5/325)) 1 tab Q6H  PRN PO PAIN LEVEL 4

-6;  Start 12/1/17 at 23:00


Heparin Sodium (Porcine) (Heparin  (5000 Units/0.5 ml)) 5,000 unit Q8 SC  Last 

administered on 12/8/17at 13:05; Admin Dose 5,000 UNIT;  Start 12/1/17 at 23:00


Atorvastatin Calcium (Lipitor) 40 mg QHS PO  Last administered on 12/7/17at 21:

57; Admin Dose 40 MG;  Start 12/1/17 at 23:00


Carvedilol (Coreg) 12.5 mg BID PO  Last administered on 12/8/17at 09:09; Admin 

Dose 12.5 MG;  Start 12/1/17 at 23:00


Gabapentin (Neurontin) 600 mg TID PO  Last administered on 12/8/17at 13:05; 

Admin Dose 300 MG;  Start 12/2/17 at 09:00


Hydralazine HCl (Apresoline) 10 mg Q4H  PRN IV ELEVATED SYSTOLIC BP Last 

administered on 12/2/17at 08:41; Admin Dose 10 MG;  Start 12/1/17 at 23:00


Diagnostic Test (Pha) (Accu-Chek) 1 ea 02 XX  Last administered on 12/3/17at 02:

49; Admin Dose 1 EA;  Start 12/2/17 at 02:00


Miscellaneous Information 1 ea NOTE XX ;  Start 12/1/17 at 23:00


Glucose (Glutose) 15 gm Q15M  PRN PO DECREASED GLUCOSE;  Start 12/1/17 at 23:00


Glucose (Glutose) 22.5 gm Q15M  PRN PO DECREASED GLUCOSE;  Start 12/1/17 at 23:

00


Dextrose (D50w Syringe) 25 ml Q15M  PRN IV DECREASED GLUCOSE;  Start 12/1/17 at 

23:00


Dextrose (D50w Syringe) 50 ml Q15M  PRN IV DECREASED GLUCOSE;  Start 12/1/17 at 

23:00


Glucagon (Glucagen) 1 mg Q15M  PRN IM DECREASED GLUCOSE;  Start 12/1/17 at 23:00


Glucose (Glutose) 15 gm Q15M  PRN BUCCAL DECREASED GLUCOSE;  Start 12/1/17 at 23

:00


Fish Oil (Fish Oil) 1,000 mg BID PO  Last administered on 12/8/17at 09:09; 

Admin Dose 1,000 MG;  Start 12/2/17 at 09:00


Lactobacillus Acidophilus (Florajen3 Capsule) 1 each BID PO  Last administered 

on 12/8/17at 09:09; Admin Dose 1 EACH;  Start 12/2/17 at 09:00


Tamsulosin HCl (Flomax) 0.4 mg DAILY PO  Last administered on 12/8/17at 09:09; 

Admin Dose 0.4 MG;  Start 12/3/17 at 09:00


Terazosin HCl (Hytrin) 1 mg HS PO  Last administered on 12/7/17at 22:00; Admin 

Dose 1 MG;  Start 12/2/17 at 21:00


Nifedipine (Procardia Xl) 30 mg DAILY PO  Last administered on 12/8/17at 09:09; 

Admin Dose 30 MG;  Start 12/4/17 at 09:00


Silver Sulfadiazine (Thermazene 1% 25 Gm) 1 applic BID TOP  Last administered 

on 12/8/17at 09:09; Admin Dose 1 APPLIC;  Start 12/5/17 at 21:00


Docusate Sodium (Colace) 100 mg BID  PRN PO Constipation Last administered on 12 /5/17at 21:08; Admin Dose 100 MG;  Start 12/5/17 at 18:00


Senna (Senokot) 1 tab BID  PRN PO Constipation Last administered on 12/5/17at 21

:08; Admin Dose 1 TAB;  Start 12/5/17 at 18:00


Doxycycline Hyclate 100 mg 100 mg BID PO  Last administered on 12/8/17at 09:09; 

Admin Dose 100 MG;  Start 12/7/17 at 21:00


Sodium Chloride (NS) 1,000 ml @  100 mls/hr Q10H IV  Last administered on 12/8/ 17at 16:35; Admin Dose 100 MLS/HR;  Start 12/7/17 at 19:30











KORIN MELARA MD Dec 8, 2017 17:25

## 2017-12-08 NOTE — CONS
Date/Time of Note


Date/Time of Note


DATE: 12/8/17 


TIME: 19:30





Consult Date/Type/Reason


Admit Date/Time


Dec 1, 2017 at 22:22


Initial Consult Date


12/5/17


Type of Consultation:  Urology


Reason for Consultation


Urinary retention


Ordering Provider:  MARCOS BOYLE





Subjective


Patient states that he is comfortable, has no pain





Objective





 Vital Signs








  Date Time  Temp Pulse Resp B/P Pulse Ox O2 Delivery O2 Flow Rate FiO2


 


12/8/17 16:36 98.0 69 18 126/70 98   














 Intake and Output   


 


 12/7/17 12/7/17 12/8/17





 15:00 23:00 07:00


 


Intake Total  1200 ml 560 ml


 


Output Total  800 ml 700 ml


 


Balance  400 ml -140 ml








Exam


Abdomen is soft there is no abdominal tenderness the Shaw catheter is draining 

clear urine





Results/Medications


Result Diagram:  


12/8/17 0733                                                                   

             12/8/17 0733





Results 24 hrs





Laboratory Tests








Test


  12/7/17


22:20 12/8/17


00:52 12/8/17


07:33 12/8/17


07:58


 


Bedside Glucose 130     107  


 


Sodium Level  123  L 127  L 


 


White Blood Count   7.1   


 


Red Blood Count   2.92  L 


 


Hemoglobin   9.0  L 


 


Hematocrit   24.4  L 


 


Mean Corpuscular Volume   83.6   


 


Mean Corpuscular Hemoglobin   30.8   


 


Mean Corpuscular Hemoglobin


Concent 


  


  36.9  


  


 


 


Red Cell Distribution Width   12.1   


 


Platelet Count   139  L 


 


Mean Platelet Volume   12.5  H 


 


Neutrophils %   58.9   


 


Lymphocytes %   24.7   


 


Monocytes %   10.8   


 


Eosinophils %   4.6   


 


Basophils %   0.6   


 


Nucleated Red Blood Cells %   0.0   


 


Neutrophils #   4.2   


 


Lymphocytes #   1.8   


 


Monocytes #   0.8   


 


Eosinophils #   0.3   


 


Basophils #   0.0   


 


Nucleated Red Blood Cells #   0.0   


 


Potassium Level   4.4   


 


Chloride Level   101   


 


Carbon Dioxide Level   18  L 


 


Anion Gap   12   


 


Blood Urea Nitrogen   42  H 


 


Creatinine   3.37  H 


 


Glucose Level   98   


 


Calcium Level   8.2  L 














Test


  12/8/17


11:56 12/8/17


17:20 


  


 


 


Bedside Glucose 141   123    








Medications





 Current Medications


Ondansetron HCl (Zofran Tab) 4 mg Q6H  PRN PO NAUSEA AND/OR VOMITING;  Start 12/ 1/17 at 23:00


Acetaminophen (Tylenol Tab) 650 mg Q6H  PRN PO PAIN LEVEL 1-3 OR FEVER;  Start 

12/1/17 at 23:00


Acetaminophen/ Hydrocodone Bitart (Norco (5/325)) 1 tab Q6H  PRN PO PAIN LEVEL 4

-6;  Start 12/1/17 at 23:00


Heparin Sodium (Porcine) (Heparin  (5000 Units/0.5 ml)) 5,000 unit Q8 SC  Last 

administered on 12/8/17at 13:05; Admin Dose 5,000 UNIT;  Start 12/1/17 at 23:00


Atorvastatin Calcium (Lipitor) 40 mg QHS PO  Last administered on 12/7/17at 21:

57; Admin Dose 40 MG;  Start 12/1/17 at 23:00


Carvedilol (Coreg) 12.5 mg BID PO  Last administered on 12/8/17at 09:09; Admin 

Dose 12.5 MG;  Start 12/1/17 at 23:00


Hydralazine HCl (Apresoline) 10 mg Q4H  PRN IV ELEVATED SYSTOLIC BP Last 

administered on 12/2/17at 08:41; Admin Dose 10 MG;  Start 12/1/17 at 23:00


Diagnostic Test (Pha) (Accu-Chek) 1 ea 02 XX  Last administered on 12/3/17at 02:

49; Admin Dose 1 EA;  Start 12/2/17 at 02:00


Miscellaneous Information 1 ea NOTE XX ;  Start 12/1/17 at 23:00


Glucose (Glutose) 15 gm Q15M  PRN PO DECREASED GLUCOSE;  Start 12/1/17 at 23:00


Glucose (Glutose) 22.5 gm Q15M  PRN PO DECREASED GLUCOSE;  Start 12/1/17 at 23:

00


Dextrose (D50w Syringe) 25 ml Q15M  PRN IV DECREASED GLUCOSE;  Start 12/1/17 at 

23:00


Dextrose (D50w Syringe) 50 ml Q15M  PRN IV DECREASED GLUCOSE;  Start 12/1/17 at 

23:00


Glucagon (Glucagen) 1 mg Q15M  PRN IM DECREASED GLUCOSE;  Start 12/1/17 at 23:00


Glucose (Glutose) 15 gm Q15M  PRN BUCCAL DECREASED GLUCOSE;  Start 12/1/17 at 23

:00


Fish Oil (Fish Oil) 1,000 mg BID PO  Last administered on 12/8/17at 09:09; 

Admin Dose 1,000 MG;  Start 12/2/17 at 09:00


Lactobacillus Acidophilus (Florajen3 Capsule) 1 each BID PO  Last administered 

on 12/8/17at 09:09; Admin Dose 1 EACH;  Start 12/2/17 at 09:00


Tamsulosin HCl (Flomax) 0.4 mg DAILY PO  Last administered on 12/8/17at 09:09; 

Admin Dose 0.4 MG;  Start 12/3/17 at 09:00


Terazosin HCl (Hytrin) 1 mg HS PO  Last administered on 12/7/17at 22:00; Admin 

Dose 1 MG;  Start 12/2/17 at 21:00


Nifedipine (Procardia Xl) 30 mg DAILY PO  Last administered on 12/8/17at 09:09; 

Admin Dose 30 MG;  Start 12/4/17 at 09:00


Silver Sulfadiazine (Thermazene 1% 25 Gm) 1 applic BID TOP  Last administered 

on 12/8/17at 09:09; Admin Dose 1 APPLIC;  Start 12/5/17 at 21:00


Docusate Sodium (Colace) 100 mg BID  PRN PO Constipation Last administered on 12 /5/17at 21:08; Admin Dose 100 MG;  Start 12/5/17 at 18:00


Senna (Senokot) 1 tab BID  PRN PO Constipation Last administered on 12/5/17at 21

:08; Admin Dose 1 TAB;  Start 12/5/17 at 18:00


Doxycycline Hyclate (Vibramycin) 100 mg BID PO  Last administered on 12/8/17at 

09:09; Admin Dose 100 MG;  Start 12/7/17 at 21:00


Citric Acid/ Sodium Citrate (Bicitra) 30 ml BID PO ;  Start 12/8/17 at 21:00


Gabapentin (Neurontin) 300 mg TID PO ;  Start 12/8/17 at 21:00





Assessment/Plan


Chief Complaint/Hosp Course


57-year-old male has an enlarged prostate urinary retention and acute on 

chronic renal failure. After the ultrasound of the kidney was done it 

demonstrated that the patient may have been in urinary retention, a Shaw 

catheter was put in and 500 mL drained out. he had the Shaw catheter now for 4 

days however his renal function did not improve and his creatinine did go up 

instead of coming down.  We will keep the Shaw catheter in to give him more 

chance and see if the renal function will improve and I also ordered a serum 

PSA on him.  PSA came back 3.0  He is already on tamsulosin with the hope that 

when the catheter is removed he will be able to urinate and empty his bladder 

better.  The urine  culture showed no growth in 48 hours


Problems:  











PALOMA COOK MD Dec 8, 2017 19:35

## 2017-12-08 NOTE — CONS
Date/Time of Note


Date/Time of Note


DATE: 12/8/17 


TIME: 14:20





Assessment/Plan


Assessment/Plan


Chief Complaint/Hosp Course


SUBJECTIVE:  No acute changes. Looks comfortable.  No fevers.  


  


ANTIMICROBIALS:  Doxycycline.


 


PHYSICAL EXAMINATION:


GENERAL:  Well-developed, middle-aged  man who is  in no distress.


HEENT:  Head atraumatic, normocephalic.  Sclerae anicteric.  Buccal mucosa pink.


NECK:  Supple.


CHEST:  Rise symmetrical.  Breath sounds clear.


HEART:  S1, S2.


ABDOMEN:  Soft.  Bowel tones present.


EXTREMITIES:  Left lower extremity, resolving erythema.


 


ASSESSMENT:


1.  Left lower extremity cellulitis, improving


2.  Acute kidney injury, possibly on chronic kidney disease.


3.  BPH.


4.  Diabetes.


5.  Hypertension.


 


PLAN:  Patient remains stable. Continue abx, topical [place], f/u renal rec-

s.





DW staff


Problems:  





Consultation Date/Type/Reason


Admit Date/Time


Dec 1, 2017 at 22:22


Initial Consult Date


12/2/17


Type of Consultation:  id


Referring Provider:  MARCOS BOYLE





Exam/Review of Systems


Vital Signs


Vitals





 Vital Signs








  Date Time  Temp Pulse Resp B/P Pulse Ox O2 Delivery O2 Flow Rate FiO2


 


12/8/17 12:39  73      


 


12/8/17 12:21 98.0  18 139/71 100   














 Intake and Output   


 


 12/7/17 12/7/17 12/8/17





 14:59 22:59 06:59


 


Intake Total  1200 ml 560 ml


 


Output Total  800 ml 700 ml


 


Balance  400 ml -140 ml











Results


Result Diagram:  


12/8/17 0733                                                                   

             12/8/17 0733





Results 24 hrs





Laboratory Tests








Test


  12/7/17


17:12 12/7/17


17:41 12/7/17


22:20 12/8/17


00:52


 


Bedside Glucose 138    130   


 


Sodium Level  120  L  123  L


 


Aspartate Amino Transf


(AST/SGOT) 


  70  H


  


  


 


 


Alanine Aminotransferase


(ALT/SGPT) 


  51  


  


  


 














Test


  12/8/17


07:33 12/8/17


07:58 12/8/17


11:56 


 


 


White Blood Count 7.1     


 


Red Blood Count 2.92  L   


 


Hemoglobin 9.0  L   


 


Hematocrit 24.4  L   


 


Mean Corpuscular Volume 83.6     


 


Mean Corpuscular Hemoglobin 30.8     


 


Mean Corpuscular Hemoglobin


Concent 36.9  


  


  


  


 


 


Red Cell Distribution Width 12.1     


 


Platelet Count 139  L   


 


Mean Platelet Volume 12.5  H   


 


Neutrophils % 58.9     


 


Lymphocytes % 24.7     


 


Monocytes % 10.8     


 


Eosinophils % 4.6     


 


Basophils % 0.6     


 


Nucleated Red Blood Cells % 0.0     


 


Neutrophils # 4.2     


 


Lymphocytes # 1.8     


 


Monocytes # 0.8     


 


Eosinophils # 0.3     


 


Basophils # 0.0     


 


Nucleated Red Blood Cells # 0.0     


 


Sodium Level 127  L   


 


Potassium Level 4.4     


 


Chloride Level 101     


 


Carbon Dioxide Level 18  L   


 


Anion Gap 12     


 


Blood Urea Nitrogen 42  H   


 


Creatinine 3.37  H   


 


Glucose Level 98     


 


Calcium Level 8.2  L   


 


Bedside Glucose  107   141   











Medications


Medications





 Current Medications


Ondansetron HCl (Zofran Tab) 4 mg Q6H  PRN PO NAUSEA AND/OR VOMITING;  Start 12/ 1/17 at 23:00


Acetaminophen (Tylenol Tab) 650 mg Q6H  PRN PO PAIN LEVEL 1-3 OR FEVER;  Start 

12/1/17 at 23:00


Acetaminophen/ Hydrocodone Bitart (Norco (5/325)) 1 tab Q6H  PRN PO PAIN LEVEL 4

-6;  Start 12/1/17 at 23:00


Heparin Sodium (Porcine) (Heparin  (5000 Units/0.5 ml)) 5,000 unit Q8 SC  Last 

administered on 12/8/17at 13:05; Admin Dose 5,000 UNIT;  Start 12/1/17 at 23:00


Atorvastatin Calcium (Lipitor) 40 mg QHS PO  Last administered on 12/7/17at 21:

57; Admin Dose 40 MG;  Start 12/1/17 at 23:00


Carvedilol (Coreg) 12.5 mg BID PO  Last administered on 12/8/17at 09:09; Admin 

Dose 12.5 MG;  Start 12/1/17 at 23:00


Gabapentin (Neurontin) 600 mg TID PO  Last administered on 12/8/17at 13:05; 

Admin Dose 300 MG;  Start 12/2/17 at 09:00


Hydralazine HCl (Apresoline) 10 mg Q4H  PRN IV ELEVATED SYSTOLIC BP Last 

administered on 12/2/17at 08:41; Admin Dose 10 MG;  Start 12/1/17 at 23:00


Diagnostic Test (Pha) (Accu-Chek) 1 ea 02 XX  Last administered on 12/3/17at 02:

49; Admin Dose 1 EA;  Start 12/2/17 at 02:00


Miscellaneous Information 1 ea NOTE XX ;  Start 12/1/17 at 23:00


Glucose (Glutose) 15 gm Q15M  PRN PO DECREASED GLUCOSE;  Start 12/1/17 at 23:00


Glucose (Glutose) 22.5 gm Q15M  PRN PO DECREASED GLUCOSE;  Start 12/1/17 at 23:

00


Dextrose (D50w Syringe) 25 ml Q15M  PRN IV DECREASED GLUCOSE;  Start 12/1/17 at 

23:00


Dextrose (D50w Syringe) 50 ml Q15M  PRN IV DECREASED GLUCOSE;  Start 12/1/17 at 

23:00


Glucagon (Glucagen) 1 mg Q15M  PRN IM DECREASED GLUCOSE;  Start 12/1/17 at 23:00


Glucose (Glutose) 15 gm Q15M  PRN BUCCAL DECREASED GLUCOSE;  Start 12/1/17 at 23

:00


Fish Oil (Fish Oil) 1,000 mg BID PO  Last administered on 12/8/17at 09:09; 

Admin Dose 1,000 MG;  Start 12/2/17 at 09:00


Lactobacillus Acidophilus (Florajen3 Capsule) 1 each BID PO  Last administered 

on 12/8/17at 09:09; Admin Dose 1 EACH;  Start 12/2/17 at 09:00


Tamsulosin HCl (Flomax) 0.4 mg DAILY PO  Last administered on 12/8/17at 09:09; 

Admin Dose 0.4 MG;  Start 12/3/17 at 09:00


Terazosin HCl (Hytrin) 1 mg HS PO  Last administered on 12/7/17at 22:00; Admin 

Dose 1 MG;  Start 12/2/17 at 21:00


Nifedipine (Procardia Xl) 30 mg DAILY PO  Last administered on 12/8/17at 09:09; 

Admin Dose 30 MG;  Start 12/4/17 at 09:00


Silver Sulfadiazine (Thermazene 1% 25 Gm) 1 applic BID TOP  Last administered 

on 12/8/17at 09:09; Admin Dose 1 APPLIC;  Start 12/5/17 at 21:00


Docusate Sodium (Colace) 100 mg BID  PRN PO Constipation Last administered on 12 /5/17at 21:08; Admin Dose 100 MG;  Start 12/5/17 at 18:00


Senna (Senokot) 1 tab BID  PRN PO Constipation Last administered on 12/5/17at 21

:08; Admin Dose 1 TAB;  Start 12/5/17 at 18:00


Doxycycline Hyclate 100 mg 100 mg BID PO  Last administered on 12/8/17at 09:09; 

Admin Dose 100 MG;  Start 12/7/17 at 21:00


Sodium Chloride (NS) 1,000 ml @  100 mls/hr Q10H IV  Last administered on 12/8/ 17at 05:42; Admin Dose 100 MLS/HR;  Start 12/7/17 at 19:30











YASMIN BOURNE NP Dec 8, 2017 14:21

## 2017-12-08 NOTE — CONS
Date/Time of Note


Date/Time of Note


DATE: 12/8/17 


TIME: 17:51





Assessment/Plan


Assessment/Plan


Additional Assessment/Plan


1. Non Oliguric Acute kidney injury vs acute kidney injury on CKD 


2. BPH with urinary retention s/p Schroeder catheter placement 


4. H/o possible CKD due to DM nephropathy, pt is not aware about it or has not 

seen any nephrologist as outpatient, I doubt it 


5. DM II


6. HTN


7. Anemia combinatino of iron deficiency anemia and anemia of chronic renal 

disease 


8. LE significant edmea, US negative for DVT, ECHO showed EF 60% with stage I 

diastoilc dysfunction 


9. Accelerated HTN





Plan:


BUN/Cr still remains elevated, , s/p tolvaptan 15mg pO x 1 dose yesterday, D/c 

IVF  today 


monitor Renal function and electrolyes 


Bp stable with  Procardia Xl to 30mg po daily and use IV hydralazine prn SBP> 

150 mm Hg 


we will continue to follow





Consultation Date/Type/Reason


Admit Date/Time


Dec 1, 2017 at 22:22


Initial Consult Date


12/2/17


Type of Consultation:  NEPHROLOGY


Referring Provider:  MARCOS BOYLE





24 HR Interval Summary


Free Text/Dictation


s/p Tolvaptan 15mg PO x1 yesterday , Na 127,BUn/Cr still high,





Exam/Review of Systems


Vital Signs


Vitals





 Vital Signs








  Date Time  Temp Pulse Resp B/P Pulse Ox O2 Delivery O2 Flow Rate FiO2


 


12/8/17 16:36 98.0 69 18 126/70 98   














 Intake and Output   


 


 12/7/17 12/7/17 12/8/17





 14:59 22:59 06:59


 


Intake Total  1200 ml 560 ml


 


Output Total  800 ml 700 ml


 


Balance  400 ml -140 ml











Exam


Constitutional:  alert


Respiratory:  clear to auscultation, diminished breath sounds, normal air 

movement


Cardiovascular:  nl pulses, regular rate and rhythm


Gastrointestinal:  nl liver, spleen, non-tender, soft, + schroeder catheter 


Musculoskeletal:  nl extremities to inspection


Extremities:  normal pulses


Neurological:  CNS II-XII intact, nl mental status, nl speech, nl strength





Results


Result Diagram:  


12/8/17 0733                                                                   

             12/8/17 0733





Results 24 hrs





Laboratory Tests








Test


  12/7/17


22:20 12/8/17


00:52 12/8/17


07:33 12/8/17


07:58


 


Bedside Glucose 130     107  


 


Sodium Level  123  L 127  L 


 


White Blood Count   7.1   


 


Red Blood Count   2.92  L 


 


Hemoglobin   9.0  L 


 


Hematocrit   24.4  L 


 


Mean Corpuscular Volume   83.6   


 


Mean Corpuscular Hemoglobin   30.8   


 


Mean Corpuscular Hemoglobin


Concent 


  


  36.9  


  


 


 


Red Cell Distribution Width   12.1   


 


Platelet Count   139  L 


 


Mean Platelet Volume   12.5  H 


 


Neutrophils %   58.9   


 


Lymphocytes %   24.7   


 


Monocytes %   10.8   


 


Eosinophils %   4.6   


 


Basophils %   0.6   


 


Nucleated Red Blood Cells %   0.0   


 


Neutrophils #   4.2   


 


Lymphocytes #   1.8   


 


Monocytes #   0.8   


 


Eosinophils #   0.3   


 


Basophils #   0.0   


 


Nucleated Red Blood Cells #   0.0   


 


Potassium Level   4.4   


 


Chloride Level   101   


 


Carbon Dioxide Level   18  L 


 


Anion Gap   12   


 


Blood Urea Nitrogen   42  H 


 


Creatinine   3.37  H 


 


Glucose Level   98   


 


Calcium Level   8.2  L 














Test


  12/8/17


11:56 12/8/17


17:20 


  


 


 


Bedside Glucose 141   123    











Medications


Medications





 Current Medications


Ondansetron HCl (Zofran Tab) 4 mg Q6H  PRN PO NAUSEA AND/OR VOMITING;  Start 12/ 1/17 at 23:00


Acetaminophen (Tylenol Tab) 650 mg Q6H  PRN PO PAIN LEVEL 1-3 OR FEVER;  Start 

12/1/17 at 23:00


Acetaminophen/ Hydrocodone Bitart (Norco (5/325)) 1 tab Q6H  PRN PO PAIN LEVEL 4

-6;  Start 12/1/17 at 23:00


Heparin Sodium (Porcine) (Heparin  (5000 Units/0.5 ml)) 5,000 unit Q8 SC  Last 

administered on 12/8/17at 13:05; Admin Dose 5,000 UNIT;  Start 12/1/17 at 23:00


Atorvastatin Calcium (Lipitor) 40 mg QHS PO  Last administered on 12/7/17at 21:

57; Admin Dose 40 MG;  Start 12/1/17 at 23:00


Carvedilol (Coreg) 12.5 mg BID PO  Last administered on 12/8/17at 09:09; Admin 

Dose 12.5 MG;  Start 12/1/17 at 23:00


Gabapentin (Neurontin) 600 mg TID PO  Last administered on 12/8/17at 13:05; 

Admin Dose 300 MG;  Start 12/2/17 at 09:00


Hydralazine HCl (Apresoline) 10 mg Q4H  PRN IV ELEVATED SYSTOLIC BP Last 

administered on 12/2/17at 08:41; Admin Dose 10 MG;  Start 12/1/17 at 23:00


Diagnostic Test (Pha) (Accu-Chek) 1 ea 02 XX  Last administered on 12/3/17at 02:

49; Admin Dose 1 EA;  Start 12/2/17 at 02:00


Miscellaneous Information 1 ea NOTE XX ;  Start 12/1/17 at 23:00


Glucose (Glutose) 15 gm Q15M  PRN PO DECREASED GLUCOSE;  Start 12/1/17 at 23:00


Glucose (Glutose) 22.5 gm Q15M  PRN PO DECREASED GLUCOSE;  Start 12/1/17 at 23:

00


Dextrose (D50w Syringe) 25 ml Q15M  PRN IV DECREASED GLUCOSE;  Start 12/1/17 at 

23:00


Dextrose (D50w Syringe) 50 ml Q15M  PRN IV DECREASED GLUCOSE;  Start 12/1/17 at 

23:00


Glucagon (Glucagen) 1 mg Q15M  PRN IM DECREASED GLUCOSE;  Start 12/1/17 at 23:00


Glucose (Glutose) 15 gm Q15M  PRN BUCCAL DECREASED GLUCOSE;  Start 12/1/17 at 23

:00


Fish Oil (Fish Oil) 1,000 mg BID PO  Last administered on 12/8/17at 09:09; 

Admin Dose 1,000 MG;  Start 12/2/17 at 09:00


Lactobacillus Acidophilus (Florajen3 Capsule) 1 each BID PO  Last administered 

on 12/8/17at 09:09; Admin Dose 1 EACH;  Start 12/2/17 at 09:00


Tamsulosin HCl (Flomax) 0.4 mg DAILY PO  Last administered on 12/8/17at 09:09; 

Admin Dose 0.4 MG;  Start 12/3/17 at 09:00


Terazosin HCl (Hytrin) 1 mg HS PO  Last administered on 12/7/17at 22:00; Admin 

Dose 1 MG;  Start 12/2/17 at 21:00


Nifedipine (Procardia Xl) 30 mg DAILY PO  Last administered on 12/8/17at 09:09; 

Admin Dose 30 MG;  Start 12/4/17 at 09:00


Silver Sulfadiazine (Thermazene 1% 25 Gm) 1 applic BID TOP  Last administered 

on 12/8/17at 09:09; Admin Dose 1 APPLIC;  Start 12/5/17 at 21:00


Docusate Sodium (Colace) 100 mg BID  PRN PO Constipation Last administered on 12 /5/17at 21:08; Admin Dose 100 MG;  Start 12/5/17 at 18:00


Senna (Senokot) 1 tab BID  PRN PO Constipation Last administered on 12/5/17at 21

:08; Admin Dose 1 TAB;  Start 12/5/17 at 18:00


Doxycycline Hyclate 100 mg 100 mg BID PO  Last administered on 12/8/17at 09:09; 

Admin Dose 100 MG;  Start 12/7/17 at 21:00


Sodium Chloride (NS) 1,000 ml @  100 mls/hr Q10H IV  Last administered on 12/8/ 17at 16:35; Admin Dose 100 MLS/HR;  Start 12/7/17 at 19:30











SANTIAGO MADISON MD Dec 8, 2017 17:52

## 2017-12-09 VITALS — DIASTOLIC BLOOD PRESSURE: 72 MMHG | SYSTOLIC BLOOD PRESSURE: 149 MMHG | RESPIRATION RATE: 21 BRPM

## 2017-12-09 VITALS — RESPIRATION RATE: 21 BRPM | DIASTOLIC BLOOD PRESSURE: 76 MMHG | SYSTOLIC BLOOD PRESSURE: 125 MMHG

## 2017-12-09 VITALS — HEART RATE: 74 BPM

## 2017-12-09 VITALS — HEART RATE: 72 BPM

## 2017-12-09 VITALS — DIASTOLIC BLOOD PRESSURE: 73 MMHG | RESPIRATION RATE: 21 BRPM | HEART RATE: 74 BPM | SYSTOLIC BLOOD PRESSURE: 146 MMHG

## 2017-12-09 VITALS — DIASTOLIC BLOOD PRESSURE: 75 MMHG | SYSTOLIC BLOOD PRESSURE: 133 MMHG | RESPIRATION RATE: 16 BRPM

## 2017-12-09 VITALS — SYSTOLIC BLOOD PRESSURE: 120 MMHG | RESPIRATION RATE: 16 BRPM | DIASTOLIC BLOOD PRESSURE: 70 MMHG

## 2017-12-09 VITALS — HEART RATE: 73 BPM

## 2017-12-09 VITALS — HEART RATE: 66 BPM

## 2017-12-09 VITALS — HEART RATE: 71 BPM

## 2017-12-09 LAB
ANION GAP SERPL CALC-SCNC: 11 MMOL/L (ref 8–16)
BASOPHILS # BLD AUTO: 0 10^3/UL (ref 0–0.1)
BASOPHILS NFR BLD: 0.6 % (ref 0–2)
BUN SERPL-MCNC: 38 MG/DL (ref 7–20)
CALCIUM SERPL-MCNC: 8.4 MG/DL (ref 8.4–10.2)
CHLORIDE SERPL-SCNC: 110 MMOL/L (ref 97–110)
CO2 SERPL-SCNC: 20 MMOL/L (ref 21–31)
CREAT SERPL-MCNC: 3.37 MG/DL (ref 0.61–1.24)
EOSINOPHIL # BLD: 0.4 10^3/UL (ref 0–0.5)
EOSINOPHIL NFR BLD: 6.1 % (ref 0–7)
ERYTHROCYTE [DISTWIDTH] IN BLOOD BY AUTOMATED COUNT: 12.7 % (ref 11.5–14.5)
GLUCOSE SERPL-MCNC: 86 MG/DL (ref 70–220)
HCT VFR BLD CALC: 23.3 % (ref 42–52)
HGB BLD-MCNC: 8.4 G/DL (ref 14–18)
LYMPHOCYTES # BLD AUTO: 2.1 10^3/UL (ref 0.8–2.9)
LYMPHOCYTES NFR BLD AUTO: 31.9 % (ref 15–51)
MCH RBC QN AUTO: 30.8 PG (ref 29–33)
MCHC RBC AUTO-ENTMCNC: 36.1 G/DL (ref 32–37)
MCV RBC AUTO: 85.3 FL (ref 82–101)
MONOCYTES # BLD: 0.6 10^3/UL (ref 0.3–0.9)
MONOCYTES NFR BLD: 9.1 % (ref 0–11)
NEUTROPHILS # BLD: 3.4 10^3/UL (ref 1.6–7.5)
NEUTROPHILS NFR BLD AUTO: 52 % (ref 39–77)
NRBC # BLD MANUAL: 0 10^3/UL (ref 0–0)
NRBC BLD AUTO-RTO: 0 /100WBC (ref 0–0)
PLATELET # BLD: 149 10^3/UL (ref 140–415)
PMV BLD AUTO: 12.5 FL (ref 7.4–10.4)
POTASSIUM SERPL-SCNC: 4.5 MMOL/L (ref 3.5–5.1)
RBC # BLD AUTO: 2.73 10^6/UL (ref 4.7–6.1)
SODIUM SERPL-SCNC: 136 MMOL/L (ref 135–144)
WBC # BLD AUTO: 6.6 10^3/UL (ref 4.8–10.8)

## 2017-12-09 RX ADMIN — DOXYCYCLINE HYCLATE SCH MG: 100 TABLET, FILM COATED ORAL at 21:57

## 2017-12-09 RX ADMIN — OMEGA-3 FATTY ACIDS CAP DELAYED RELEASE 1000 MG SCH MG: 1000 CAPSULE DELAYED RELEASE at 21:58

## 2017-12-09 RX ADMIN — GABAPENTIN SCH MG: 300 CAPSULE ORAL at 21:00

## 2017-12-09 RX ADMIN — NIFEDIPINE SCH MG: 60 TABLET, FILM COATED, EXTENDED RELEASE ORAL at 08:58

## 2017-12-09 RX ADMIN — HEPARIN SODIUM SCH UNIT: 5000 INJECTION, SOLUTION INTRAVENOUS; SUBCUTANEOUS at 14:41

## 2017-12-09 RX ADMIN — DOXYCYCLINE HYCLATE SCH MG: 100 TABLET, FILM COATED ORAL at 08:59

## 2017-12-09 RX ADMIN — HEPARIN SODIUM SCH UNIT: 5000 INJECTION, SOLUTION INTRAVENOUS; SUBCUTANEOUS at 05:28

## 2017-12-09 RX ADMIN — SILVER SULFADIAZINE SCH APPLIC: 10 CREAM TOPICAL at 09:04

## 2017-12-09 RX ADMIN — Medication SCH EACH: at 08:59

## 2017-12-09 RX ADMIN — SODIUM CITRATE AND CITRIC ACID MONOHYDRATE SCH ML: 500; 334 SOLUTION ORAL at 22:00

## 2017-12-09 RX ADMIN — Medication SCH EACH: at 21:57

## 2017-12-09 RX ADMIN — LEVOTHYROXINE SODIUM SCH MCG: 75 TABLET ORAL at 06:18

## 2017-12-09 RX ADMIN — TAMSULOSIN HYDROCHLORIDE SCH MG: 0.4 CAPSULE ORAL at 08:58

## 2017-12-09 RX ADMIN — GABAPENTIN SCH MG: 300 CAPSULE ORAL at 08:57

## 2017-12-09 RX ADMIN — HEPARIN SODIUM SCH UNIT: 5000 INJECTION, SOLUTION INTRAVENOUS; SUBCUTANEOUS at 22:11

## 2017-12-09 RX ADMIN — ATORVASTATIN CALCIUM SCH MG: 40 TABLET, FILM COATED ORAL at 21:57

## 2017-12-09 RX ADMIN — OMEGA-3 FATTY ACIDS CAP DELAYED RELEASE 1000 MG SCH MG: 1000 CAPSULE DELAYED RELEASE at 08:59

## 2017-12-09 RX ADMIN — GABAPENTIN SCH MG: 300 CAPSULE ORAL at 12:22

## 2017-12-09 RX ADMIN — TERAZOSIN HYDROCHLORIDE ANHYDROUS SCH MG: 1 CAPSULE ORAL at 21:58

## 2017-12-09 RX ADMIN — SILVER SULFADIAZINE SCH APPLIC: 10 CREAM TOPICAL at 22:00

## 2017-12-09 RX ADMIN — SODIUM CITRATE AND CITRIC ACID MONOHYDRATE SCH ML: 500; 334 SOLUTION ORAL at 08:57

## 2017-12-09 NOTE — CONS
Date/Time of Note


Date/Time of Note


DATE: 12/9/17 


TIME: 22:07





Consult Date/Type/Reason


Admit Date/Time


Dec 1, 2017 at 22:22


Initial Consult Date


12/5/17


Type of Consultation:  urology


Reason for Consultation


Urinary retention


Ordering Provider:  MARCOS BOYLE





Subjective


patient states that his urine is clear. The schroeder was removed earlier and he 

has voided and the post void residual was about 150ml





Objective





 Vital Signs








  Date Time  Temp Pulse Resp B/P Pulse Ox O2 Delivery O2 Flow Rate FiO2


 


12/9/17 20:10  72      


 


12/9/17 16:27 97.8  21 125/76 99   














 Intake and Output   


 


 12/8/17 12/8/17 12/9/17





 15:00 23:00 07:00


 


Intake Total  800 ml 600 ml


 


Output Total  3300 ml 2200 ml


 


Balance  -2500 ml -1600 ml











Results/Medications


Result Diagram:  


12/9/17 0458                                                                   

             12/9/17 0458





Results 24 hrs





Laboratory Tests








Test


  12/9/17


04:58 12/9/17


07:57 12/9/17


11:31 12/9/17


17:33


 


White Blood Count 6.6     


 


Red Blood Count 2.73  L   


 


Hemoglobin 8.4  L   


 


Hematocrit 23.3  L   


 


Mean Corpuscular Volume 85.3     


 


Mean Corpuscular Hemoglobin 30.8     


 


Mean Corpuscular Hemoglobin


Concent 36.1  


  


  


  


 


 


Red Cell Distribution Width 12.7     


 


Platelet Count 149     


 


Mean Platelet Volume 12.5  H   


 


Neutrophils % 52.0     


 


Lymphocytes % 31.9     


 


Monocytes % 9.1     


 


Eosinophils % 6.1     


 


Basophils % 0.6     


 


Nucleated Red Blood Cells % 0.0     


 


Neutrophils # 3.4     


 


Lymphocytes # 2.1     


 


Monocytes # 0.6     


 


Eosinophils # 0.4     


 


Basophils # 0.0     


 


Nucleated Red Blood Cells # 0.0     


 


Sodium Level 136     


 


Potassium Level 4.5     


 


Chloride Level 110     


 


Carbon Dioxide Level 20  L   


 


Anion Gap 11     


 


Blood Urea Nitrogen 38  H   


 


Creatinine 3.37  H   


 


Glucose Level 86     


 


Calcium Level 8.4     


 


Bedside Glucose  89   137   134  








Medications





 Current Medications


Ondansetron HCl (Zofran Tab) 4 mg Q6H  PRN PO NAUSEA AND/OR VOMITING;  Start 12/ 1/17 at 23:00


Acetaminophen (Tylenol Tab) 650 mg Q6H  PRN PO PAIN LEVEL 1-3 OR FEVER;  Start 

12/1/17 at 23:00


Acetaminophen/ Hydrocodone Bitart (Norco (5/325)) 1 tab Q6H  PRN PO PAIN LEVEL 4

-6;  Start 12/1/17 at 23:00


Heparin Sodium (Porcine) (Heparin  (5000 Units/0.5 ml)) 5,000 unit Q8 SC  Last 

administered on 12/9/17at 14:41; Admin Dose 5,000 UNIT;  Start 12/1/17 at 23:00


Atorvastatin Calcium (Lipitor) 40 mg QHS PO  Last administered on 12/8/17at 20:

58; Admin Dose 40 MG;  Start 12/1/17 at 23:00


Carvedilol (Coreg) 12.5 mg BID PO  Last administered on 12/9/17at 08:59; Admin 

Dose 12.5 MG;  Start 12/1/17 at 23:00


Hydralazine HCl (Apresoline) 10 mg Q4H  PRN IV ELEVATED SYSTOLIC BP Last 

administered on 12/2/17at 08:41; Admin Dose 10 MG;  Start 12/1/17 at 23:00


Diagnostic Test (Pha) (Accu-Chek) 1 ea 02 XX  Last administered on 12/3/17at 02:

49; Admin Dose 1 EA;  Start 12/2/17 at 02:00


Miscellaneous Information 1 ea NOTE XX ;  Start 12/1/17 at 23:00


Glucose (Glutose) 15 gm Q15M  PRN PO DECREASED GLUCOSE;  Start 12/1/17 at 23:00


Glucose (Glutose) 22.5 gm Q15M  PRN PO DECREASED GLUCOSE;  Start 12/1/17 at 23:

00


Dextrose (D50w Syringe) 25 ml Q15M  PRN IV DECREASED GLUCOSE;  Start 12/1/17 at 

23:00


Dextrose (D50w Syringe) 50 ml Q15M  PRN IV DECREASED GLUCOSE;  Start 12/1/17 at 

23:00


Glucagon (Glucagen) 1 mg Q15M  PRN IM DECREASED GLUCOSE;  Start 12/1/17 at 23:00


Glucose (Glutose) 15 gm Q15M  PRN BUCCAL DECREASED GLUCOSE;  Start 12/1/17 at 23

:00


Fish Oil (Fish Oil) 1,000 mg BID PO  Last administered on 12/9/17at 08:59; 

Admin Dose 1,000 MG;  Start 12/2/17 at 09:00


Lactobacillus Acidophilus (Florajen3 Capsule) 1 each BID PO  Last administered 

on 12/9/17at 08:59; Admin Dose 1 EACH;  Start 12/2/17 at 09:00


Tamsulosin HCl (Flomax) 0.4 mg DAILY PO  Last administered on 12/9/17at 08:58; 

Admin Dose 0.4 MG;  Start 12/3/17 at 09:00


Terazosin HCl (Hytrin) 1 mg HS PO  Last administered on 12/8/17at 20:57; Admin 

Dose 1 MG;  Start 12/2/17 at 21:00


Nifedipine (Procardia Xl) 30 mg DAILY PO  Last administered on 12/9/17at 08:58; 

Admin Dose 30 MG;  Start 12/4/17 at 09:00


Silver Sulfadiazine (Thermazene 1% 25 Gm) 1 applic BID TOP  Last administered 

on 12/9/17at 09:04; Admin Dose 1 APPLIC;  Start 12/5/17 at 21:00


Docusate Sodium (Colace) 100 mg BID  PRN PO Constipation Last administered on 12 /5/17at 21:08; Admin Dose 100 MG;  Start 12/5/17 at 18:00


Senna (Senokot) 1 tab BID  PRN PO Constipation Last administered on 12/5/17at 21

:08; Admin Dose 1 TAB;  Start 12/5/17 at 18:00


Doxycycline Hyclate (Vibramycin) 100 mg BID PO  Last administered on 12/9/17at 

08:59; Admin Dose 100 MG;  Start 12/7/17 at 21:00


Citric Acid/ Sodium Citrate (Bicitra) 30 ml BID PO  Last administered on 12/9/ 17at 08:57; Admin Dose 30 ML;  Start 12/8/17 at 21:00


Gabapentin (Neurontin) 300 mg TID PO  Last administered on 12/9/17at 12:22; 

Admin Dose 300 MG;  Start 12/8/17 at 21:00





Assessment/Plan


Chief Complaint/Hosp Course


57-year-old male has an enlarged prostate urinary retention and acute on 

chronic renal failure. After the ultrasound of the kidney was done it 

demonstrated that the patient may have been in urinary retention, a Schroeder 

catheter was put in and 500 mL drained out. he had the Schroeder catheter for 5 

days however his renal function did not improve and his creatinine did go up 

instead of coming down. we'll continue to check the PVR and do straight cath  

for a post void residual of 300ml


Problems:  











PALOMA COOK MD Dec 9, 2017 22:16

## 2017-12-09 NOTE — CONS
Date/Time of Note


Date/Time of Note


DATE: 12/9/17 


TIME: 15:13





Assessment/Plan


Assessment/Plan


Chief Complaint/Hosp Course


ID PROGRESS NOTE


CURRENT ABX:  =>Doxycycline #3


s/p Clindamycin x7 days


* A/A/O, VSS, NAD, denies new health issues, feeling better, family in room, 

tells me he is very happy with status of LLEXT cellulitis improving. 


* MICRO: bcX (-); uRINE cX (-) 


----------------------------------------------------





PHYSICAL EXAMINATION:


GENERAL:  Overweight M, VSS, NAD 


HEENT: Unremarkable 


NECK:  Supple, trach midline


CHEST:  Equal chest rise bilaterally, no distress


HEART: RRR pulse 


ABDOMEN:  Soft


EXT: Warm, no edema, moves extremities 


SKIN: No rash, no diaphoresis 





ID ASSESSMENT:


56 yo obese M admit with:


1.  Left lower extremity cellulitis superimposed on LLEXT edema => IMPROVING 


* Continue topical Silvadene 


* 12/1/17 Venous US: No sonographic evidence for deep venous thrombosis.


2.  Acute kidney injury, possibly on chronic kidney disease.


3.  BPH w/lower urinary tract sxs=> Urinary retention, started on Flomax 


4.  Diabetes.


5.  Hypertension.


ABX ALLERGIES: NKDA


INVASIVES: PIV 


CURRENT ABX:  =>Doxycycline #3


s/p Clindamycin x7 days


ID RECOMMENDATIONS/PLAN:  


1.  Patient reports cellulitis is improving => When cleared for DC, may DC home 

w/Rx Doxycycline 100mg PO BID x 7 days 





.





.


Problems:  





Consultation Date/Type/Reason


Admit Date/Time


Dec 1, 2017 at 22:22


Initial Consult Date


12/5/17


Type of Consultation:  ID


Referring Provider:  MARCOS BOYLE





Exam/Review of Systems


Vital Signs


Vitals





 Vital Signs








  Date Time  Temp Pulse Resp B/P Pulse Ox O2 Delivery O2 Flow Rate FiO2


 


12/9/17 12:00  74      


 


12/9/17 12:00 98.0  21 146/73 98   














 Intake and Output   


 


 12/8/17 12/8/17 12/9/17





 15:00 23:00 07:00


 


Intake Total  800 ml 600 ml


 


Output Total  3300 ml 2200 ml


 


Balance  -2500 ml -1600 ml











Results


Result Diagram:  


12/9/17 0458                                                                   

             12/9/17 0458





Results 24 hrs





Laboratory Tests








Test


  12/8/17


17:20 12/8/17


20:14 12/9/17


04:58 12/9/17


07:57


 


Bedside Glucose 123   124    89  


 


White Blood Count   6.6   


 


Red Blood Count   2.73  L 


 


Hemoglobin   8.4  L 


 


Hematocrit   23.3  L 


 


Mean Corpuscular Volume   85.3   


 


Mean Corpuscular Hemoglobin   30.8   


 


Mean Corpuscular Hemoglobin


Concent 


  


  36.1  


  


 


 


Red Cell Distribution Width   12.7   


 


Platelet Count   149   


 


Mean Platelet Volume   12.5  H 


 


Neutrophils %   52.0   


 


Lymphocytes %   31.9   


 


Monocytes %   9.1   


 


Eosinophils %   6.1   


 


Basophils %   0.6   


 


Nucleated Red Blood Cells %   0.0   


 


Neutrophils #   3.4   


 


Lymphocytes #   2.1   


 


Monocytes #   0.6   


 


Eosinophils #   0.4   


 


Basophils #   0.0   


 


Nucleated Red Blood Cells #   0.0   


 


Sodium Level   136   


 


Potassium Level   4.5   


 


Chloride Level   110   


 


Carbon Dioxide Level   20  L 


 


Anion Gap   11   


 


Blood Urea Nitrogen   38  H 


 


Creatinine   3.37  H 


 


Glucose Level   86   


 


Calcium Level   8.4   














Test


  12/9/17


11:31 


  


  


 


 


Bedside Glucose 137     











Medications


Medications





 Current Medications


Ondansetron HCl (Zofran Tab) 4 mg Q6H  PRN PO NAUSEA AND/OR VOMITING;  Start 12/ 1/17 at 23:00


Acetaminophen (Tylenol Tab) 650 mg Q6H  PRN PO PAIN LEVEL 1-3 OR FEVER;  Start 

12/1/17 at 23:00


Acetaminophen/ Hydrocodone Bitart (Norco (5/325)) 1 tab Q6H  PRN PO PAIN LEVEL 4

-6;  Start 12/1/17 at 23:00


Heparin Sodium (Porcine) (Heparin  (5000 Units/0.5 ml)) 5,000 unit Q8 SC  Last 

administered on 12/9/17at 14:41; Admin Dose 5,000 UNIT;  Start 12/1/17 at 23:00


Atorvastatin Calcium (Lipitor) 40 mg QHS PO  Last administered on 12/8/17at 20:

58; Admin Dose 40 MG;  Start 12/1/17 at 23:00


Carvedilol (Coreg) 12.5 mg BID PO  Last administered on 12/9/17at 08:59; Admin 

Dose 12.5 MG;  Start 12/1/17 at 23:00


Hydralazine HCl (Apresoline) 10 mg Q4H  PRN IV ELEVATED SYSTOLIC BP Last 

administered on 12/2/17at 08:41; Admin Dose 10 MG;  Start 12/1/17 at 23:00


Diagnostic Test (Pha) (Accu-Chek) 1 ea 02 XX  Last administered on 12/3/17at 02:

49; Admin Dose 1 EA;  Start 12/2/17 at 02:00


Miscellaneous Information 1 ea NOTE XX ;  Start 12/1/17 at 23:00


Glucose (Glutose) 15 gm Q15M  PRN PO DECREASED GLUCOSE;  Start 12/1/17 at 23:00


Glucose (Glutose) 22.5 gm Q15M  PRN PO DECREASED GLUCOSE;  Start 12/1/17 at 23:

00


Dextrose (D50w Syringe) 25 ml Q15M  PRN IV DECREASED GLUCOSE;  Start 12/1/17 at 

23:00


Dextrose (D50w Syringe) 50 ml Q15M  PRN IV DECREASED GLUCOSE;  Start 12/1/17 at 

23:00


Glucagon (Glucagen) 1 mg Q15M  PRN IM DECREASED GLUCOSE;  Start 12/1/17 at 23:00


Glucose (Glutose) 15 gm Q15M  PRN BUCCAL DECREASED GLUCOSE;  Start 12/1/17 at 23

:00


Fish Oil (Fish Oil) 1,000 mg BID PO  Last administered on 12/9/17at 08:59; 

Admin Dose 1,000 MG;  Start 12/2/17 at 09:00


Lactobacillus Acidophilus (Florajen3 Capsule) 1 each BID PO  Last administered 

on 12/9/17at 08:59; Admin Dose 1 EACH;  Start 12/2/17 at 09:00


Tamsulosin HCl (Flomax) 0.4 mg DAILY PO  Last administered on 12/9/17at 08:58; 

Admin Dose 0.4 MG;  Start 12/3/17 at 09:00


Terazosin HCl (Hytrin) 1 mg HS PO  Last administered on 12/8/17at 20:57; Admin 

Dose 1 MG;  Start 12/2/17 at 21:00


Nifedipine (Procardia Xl) 30 mg DAILY PO  Last administered on 12/9/17at 08:58; 

Admin Dose 30 MG;  Start 12/4/17 at 09:00


Silver Sulfadiazine (Thermazene 1% 25 Gm) 1 applic BID TOP  Last administered 

on 12/9/17at 09:04; Admin Dose 1 APPLIC;  Start 12/5/17 at 21:00


Docusate Sodium (Colace) 100 mg BID  PRN PO Constipation Last administered on 12 /5/17at 21:08; Admin Dose 100 MG;  Start 12/5/17 at 18:00


Senna (Senokot) 1 tab BID  PRN PO Constipation Last administered on 12/5/17at 21

:08; Admin Dose 1 TAB;  Start 12/5/17 at 18:00


Doxycycline Hyclate (Vibramycin) 100 mg BID PO  Last administered on 12/9/17at 

08:59; Admin Dose 100 MG;  Start 12/7/17 at 21:00


Citric Acid/ Sodium Citrate (Bicitra) 30 ml BID PO  Last administered on 12/9/ 17at 08:57; Admin Dose 30 ML;  Start 12/8/17 at 21:00


Gabapentin (Neurontin) 300 mg TID PO  Last administered on 12/9/17at 12:22; 

Admin Dose 300 MG;  Start 12/8/17 at 21:00











FLORI PRESLEY NP Dec 9, 2017 15:24

## 2017-12-09 NOTE — PN
Date/Time of Note


Date/Time of Note


DATE: 12/9/17 


TIME: 11:25





Assessment/Plan


VTE Prophylaxis


VTE Prophylaxis Intervention:  SCD's





Lines/Catheters


IV Catheter Type (from Nrs):  Peripheral IV


Urinary Cath still in place:  Yes





Assessment/Plan


Chief Complaint/Hosp Course


Assessment and plan





1.  Acute kidney injury..  Nephrologist following.  Medications to be renally 

dosed.  Monitor for improvement.  Continue with nephrologist 

recommendations.Patient did have renal ultrasound showing moderate enlarged 

prostate with impression on posterior inferior bladder with bladder distention.

  f/u urologist recs. plan for d/c today





2.  Left lower extremity cellulitis.  Continue on antibiotics per ID 

recommendations. improving





3.  BPH.  Continue Flomax and terazosin.  Schroeder catheter in place.  continue 

with urologist recs 





4.  Normocytic normochromic anemia.  Noted with iron deficiency.  Continue iron 

supplement. stable 





5.  Hypertension.  Continue antihypertensives and adjust needed.stable





6.  Diabetes.  Continue insulin regimen.  A1c noted at 6.3. stable





7.  Hypothyroidism.  Continue Synthroid





8.  Dyslipidemia.  Continue on statin medication





9.  Diabetic neuropathy.  Continue on Neurontin





10. Hyponatremia. monitor trend. IVF per nephrology. 





Disposition and plan: plan for schroeder d/c. monitor renal panel. Monitor to see 

if can void. d/c when cleared by consultants  





Discussed plan of care with Dr. Giraldo


Problems:  





Subjective


24 Hr Interval Summary


Free Text/Dictation


no s/s of distress. resting at this time





Exam/Review of Systems


Vital Signs


Vitals





 Vital Signs








  Date Time  Temp Pulse Resp B/P Pulse Ox O2 Delivery O2 Flow Rate FiO2


 


12/9/17 08:50 98.6 75 21 149/72 99   














 Intake and Output   


 


 12/8/17 12/8/17 12/9/17





 15:00 23:00 07:00


 


Intake Total  800 ml 600 ml


 


Output Total  3300 ml 2200 ml


 


Balance  -2500 ml -1600 ml











Exam


Constitutional:  alert, oriented


Psych:  nl mood/affect


Head:  normocephalic


Eyes:  nl conjunctiva


Neck:  non-tender, supple


Respiratory:  clear to auscultation, normal air movement


Cardiovascular:  regular rate and rhythm


Gastrointestinal:  non-tender, soft


: schroeder in place


Musculoskeletal:  No swelling


Neurological:  CNS II-XII intact, nl mental status, nl speech


Skin: less redness LLE, improving





Results


Result Diagram:  


12/9/17 0458                                                                   

             12/9/17 0458





Results 24 hrs





Laboratory Tests








Test


  12/8/17


11:56 12/8/17


17:20 12/8/17


20:14 12/9/17


04:58


 


Bedside Glucose 141   123   124   


 


White Blood Count    6.6  


 


Red Blood Count    2.73  L


 


Hemoglobin    8.4  L


 


Hematocrit    23.3  L


 


Mean Corpuscular Volume    85.3  


 


Mean Corpuscular Hemoglobin    30.8  


 


Mean Corpuscular Hemoglobin


Concent 


  


  


  36.1  


 


 


Red Cell Distribution Width    12.7  


 


Platelet Count    149  


 


Mean Platelet Volume    12.5  H


 


Neutrophils %    52.0  


 


Lymphocytes %    31.9  


 


Monocytes %    9.1  


 


Eosinophils %    6.1  


 


Basophils %    0.6  


 


Nucleated Red Blood Cells %    0.0  


 


Neutrophils #    3.4  


 


Lymphocytes #    2.1  


 


Monocytes #    0.6  


 


Eosinophils #    0.4  


 


Basophils #    0.0  


 


Nucleated Red Blood Cells #    0.0  


 


Sodium Level    136  


 


Potassium Level    4.5  


 


Chloride Level    110  


 


Carbon Dioxide Level    20  L


 


Anion Gap    11  


 


Blood Urea Nitrogen    38  H


 


Creatinine    3.37  H


 


Glucose Level    86  


 


Calcium Level    8.4  














Test


  12/9/17


07:57 


  


  


 


 


Bedside Glucose 89     











Medications


Medications





 Current Medications


Ondansetron HCl (Zofran Tab) 4 mg Q6H  PRN PO NAUSEA AND/OR VOMITING;  Start 12/ 1/17 at 23:00


Acetaminophen (Tylenol Tab) 650 mg Q6H  PRN PO PAIN LEVEL 1-3 OR FEVER;  Start 

12/1/17 at 23:00


Acetaminophen/ Hydrocodone Bitart (Norco (5/325)) 1 tab Q6H  PRN PO PAIN LEVEL 4

-6;  Start 12/1/17 at 23:00


Heparin Sodium (Porcine) (Heparin  (5000 Units/0.5 ml)) 5,000 unit Q8 SC  Last 

administered on 12/9/17at 05:28; Admin Dose 5,000 UNIT;  Start 12/1/17 at 23:00


Atorvastatin Calcium (Lipitor) 40 mg QHS PO  Last administered on 12/8/17at 20:

58; Admin Dose 40 MG;  Start 12/1/17 at 23:00


Carvedilol (Coreg) 12.5 mg BID PO  Last administered on 12/9/17at 08:59; Admin 

Dose 12.5 MG;  Start 12/1/17 at 23:00


Hydralazine HCl (Apresoline) 10 mg Q4H  PRN IV ELEVATED SYSTOLIC BP Last 

administered on 12/2/17at 08:41; Admin Dose 10 MG;  Start 12/1/17 at 23:00


Diagnostic Test (Pha) (Accu-Chek) 1 ea 02 XX  Last administered on 12/3/17at 02:

49; Admin Dose 1 EA;  Start 12/2/17 at 02:00


Miscellaneous Information 1 ea NOTE XX ;  Start 12/1/17 at 23:00


Glucose (Glutose) 15 gm Q15M  PRN PO DECREASED GLUCOSE;  Start 12/1/17 at 23:00


Glucose (Glutose) 22.5 gm Q15M  PRN PO DECREASED GLUCOSE;  Start 12/1/17 at 23:

00


Dextrose (D50w Syringe) 25 ml Q15M  PRN IV DECREASED GLUCOSE;  Start 12/1/17 at 

23:00


Dextrose (D50w Syringe) 50 ml Q15M  PRN IV DECREASED GLUCOSE;  Start 12/1/17 at 

23:00


Glucagon (Glucagen) 1 mg Q15M  PRN IM DECREASED GLUCOSE;  Start 12/1/17 at 23:00


Glucose (Glutose) 15 gm Q15M  PRN BUCCAL DECREASED GLUCOSE;  Start 12/1/17 at 23

:00


Fish Oil (Fish Oil) 1,000 mg BID PO  Last administered on 12/9/17at 08:59; 

Admin Dose 1,000 MG;  Start 12/2/17 at 09:00


Lactobacillus Acidophilus (Florajen3 Capsule) 1 each BID PO  Last administered 

on 12/9/17at 08:59; Admin Dose 1 EACH;  Start 12/2/17 at 09:00


Tamsulosin HCl (Flomax) 0.4 mg DAILY PO  Last administered on 12/9/17at 08:58; 

Admin Dose 0.4 MG;  Start 12/3/17 at 09:00


Terazosin HCl (Hytrin) 1 mg HS PO  Last administered on 12/8/17at 20:57; Admin 

Dose 1 MG;  Start 12/2/17 at 21:00


Nifedipine (Procardia Xl) 30 mg DAILY PO  Last administered on 12/9/17at 08:58; 

Admin Dose 30 MG;  Start 12/4/17 at 09:00


Silver Sulfadiazine (Thermazene 1% 25 Gm) 1 applic BID TOP  Last administered 

on 12/9/17at 09:04; Admin Dose 1 APPLIC;  Start 12/5/17 at 21:00


Docusate Sodium (Colace) 100 mg BID  PRN PO Constipation Last administered on 12 /5/17at 21:08; Admin Dose 100 MG;  Start 12/5/17 at 18:00


Senna (Senokot) 1 tab BID  PRN PO Constipation Last administered on 12/5/17at 21

:08; Admin Dose 1 TAB;  Start 12/5/17 at 18:00


Doxycycline Hyclate (Vibramycin) 100 mg BID PO  Last administered on 12/9/17at 

08:59; Admin Dose 100 MG;  Start 12/7/17 at 21:00


Citric Acid/ Sodium Citrate (Bicitra) 30 ml BID PO  Last administered on 12/9/ 17at 08:57; Admin Dose 30 ML;  Start 12/8/17 at 21:00


Gabapentin (Neurontin) 300 mg TID PO  Last administered on 12/9/17at 08:57; 

Admin Dose 300 MG;  Start 12/8/17 at 21:00











MARGIE WALTON Dec 9, 2017 11:42

## 2017-12-09 NOTE — CONS
Date/Time of Note


Date/Time of Note


DATE: 12/9/17 


TIME: 10:49





Assessment/Plan


Assessment/Plan


Additional Assessment/Plan


1. Non Oliguric Acute kidney injury vs acute kidney injury on CKD 


2. BPH with urinary retention s/p Shaw catheter placement 


4. H/o possible CKD due to DM nephropathy, pt is not aware about it or has not 

seen any nephrologist as outpatient, I doubt it 


5. DM II


6. HTN


7. Anemia combination of iron deficiency anemia and anemia of chronic renal 

disease 


8. LE significant edema, US negative for DVT, ECHO showed EF 60% with stage I 

diastoilc dysfunction 


9. Accelerated HTN





Plan:


-BUN/Cr still remains elevated-38/3.37


-s/p tolvaptan 15mg pO x 1 dose yesterday- Na 136 TODAY 


-monitor Renal function and electrolyes 


-Bp stable with  Procardia Xl to 30mg po daily and use IV hydralazine prn SBP> 

150 mm Hg 


-we will continue to follow








s/p Tolvaptan 15mg PO x1 yesterday , Na 127,BUn/Cr still high,





Consultation Date/Type/Reason


Admit Date/Time


Dec 1, 2017 at 22:22


Initial Consult Date


12/5/17


Type of Consultation:  Urology


Referring Provider:  MARCOS BOYLE





24 HR Interval Summary


Free Text/Dictation


- nad


- afebrile


-feels better, denies any complaints


- BUN/Cr still remains elevated-38/3.37- Fc will be dcd today- staff will check 

PVR, Dr Corral follows


- no new issues reported overnight


Dw Dr ADAN Tomas/staff.





Detailed Summary


Respiratory:  no complaints


Cardiovascular:  no complaints


Gastrointestinal:  no complaints


Genitourinary:  no complaints





Exam/Review of Systems


Vital Signs


Vitals





 Vital Signs








  Date Time  Temp Pulse Resp B/P Pulse Ox O2 Delivery O2 Flow Rate FiO2


 


12/9/17 08:50 98.6 75 21 149/72 99   














 Intake and Output   


 


 12/8/17 12/8/17 12/9/17





 15:00 23:00 07:00


 


Intake Total  800 ml 600 ml


 


Output Total  3300 ml 2200 ml


 


Balance  -2500 ml -1600 ml











Exam


Constitutional:  alert, oriented, well developed


Respiratory:  clear to auscultation, normal air movement


Cardiovascular:  nl pulses


Gastrointestinal:  non-tender, soft


Musculoskeletal:  nl extremities to inspection


Extremities:  normal pulses


Neurological:  nl mental status, nl speech





Results


Result Diagram:  


12/9/17 0458                                                                   

             12/9/17 0458





Results 24 hrs





Laboratory Tests








Test


  12/8/17


11:56 12/8/17


17:20 12/8/17


20:14 12/9/17


04:58


 


Bedside Glucose 141   123   124   


 


White Blood Count    6.6  


 


Red Blood Count    2.73  L


 


Hemoglobin    8.4  L


 


Hematocrit    23.3  L


 


Mean Corpuscular Volume    85.3  


 


Mean Corpuscular Hemoglobin    30.8  


 


Mean Corpuscular Hemoglobin


Concent 


  


  


  36.1  


 


 


Red Cell Distribution Width    12.7  


 


Platelet Count    149  


 


Mean Platelet Volume    12.5  H


 


Neutrophils %    52.0  


 


Lymphocytes %    31.9  


 


Monocytes %    9.1  


 


Eosinophils %    6.1  


 


Basophils %    0.6  


 


Nucleated Red Blood Cells %    0.0  


 


Neutrophils #    3.4  


 


Lymphocytes #    2.1  


 


Monocytes #    0.6  


 


Eosinophils #    0.4  


 


Basophils #    0.0  


 


Nucleated Red Blood Cells #    0.0  


 


Sodium Level    136  


 


Potassium Level    4.5  


 


Chloride Level    110  


 


Carbon Dioxide Level    20  L


 


Anion Gap    11  


 


Blood Urea Nitrogen    38  H


 


Creatinine    3.37  H


 


Glucose Level    86  


 


Calcium Level    8.4  














Test


  12/9/17


07:57 


  


  


 


 


Bedside Glucose 89     











Medications


Medications





 Current Medications


Ondansetron HCl (Zofran Tab) 4 mg Q6H  PRN PO NAUSEA AND/OR VOMITING;  Start 12/ 1/17 at 23:00


Acetaminophen (Tylenol Tab) 650 mg Q6H  PRN PO PAIN LEVEL 1-3 OR FEVER;  Start 

12/1/17 at 23:00


Acetaminophen/ Hydrocodone Bitart (Norco (5/325)) 1 tab Q6H  PRN PO PAIN LEVEL 4

-6;  Start 12/1/17 at 23:00


Heparin Sodium (Porcine) (Heparin  (5000 Units/0.5 ml)) 5,000 unit Q8 SC  Last 

administered on 12/9/17at 05:28; Admin Dose 5,000 UNIT;  Start 12/1/17 at 23:00


Atorvastatin Calcium (Lipitor) 40 mg QHS PO  Last administered on 12/8/17at 20:

58; Admin Dose 40 MG;  Start 12/1/17 at 23:00


Carvedilol (Coreg) 12.5 mg BID PO  Last administered on 12/9/17at 08:59; Admin 

Dose 12.5 MG;  Start 12/1/17 at 23:00


Hydralazine HCl (Apresoline) 10 mg Q4H  PRN IV ELEVATED SYSTOLIC BP Last 

administered on 12/2/17at 08:41; Admin Dose 10 MG;  Start 12/1/17 at 23:00


Diagnostic Test (Pha) (Accu-Chek) 1 ea 02 XX  Last administered on 12/3/17at 02:

49; Admin Dose 1 EA;  Start 12/2/17 at 02:00


Miscellaneous Information 1 ea NOTE XX ;  Start 12/1/17 at 23:00


Glucose (Glutose) 15 gm Q15M  PRN PO DECREASED GLUCOSE;  Start 12/1/17 at 23:00


Glucose (Glutose) 22.5 gm Q15M  PRN PO DECREASED GLUCOSE;  Start 12/1/17 at 23:

00


Dextrose (D50w Syringe) 25 ml Q15M  PRN IV DECREASED GLUCOSE;  Start 12/1/17 at 

23:00


Dextrose (D50w Syringe) 50 ml Q15M  PRN IV DECREASED GLUCOSE;  Start 12/1/17 at 

23:00


Glucagon (Glucagen) 1 mg Q15M  PRN IM DECREASED GLUCOSE;  Start 12/1/17 at 23:00


Glucose (Glutose) 15 gm Q15M  PRN BUCCAL DECREASED GLUCOSE;  Start 12/1/17 at 23

:00


Fish Oil (Fish Oil) 1,000 mg BID PO  Last administered on 12/9/17at 08:59; 

Admin Dose 1,000 MG;  Start 12/2/17 at 09:00


Lactobacillus Acidophilus (Florajen3 Capsule) 1 each BID PO  Last administered 

on 12/9/17at 08:59; Admin Dose 1 EACH;  Start 12/2/17 at 09:00


Tamsulosin HCl (Flomax) 0.4 mg DAILY PO  Last administered on 12/9/17at 08:58; 

Admin Dose 0.4 MG;  Start 12/3/17 at 09:00


Terazosin HCl (Hytrin) 1 mg HS PO  Last administered on 12/8/17at 20:57; Admin 

Dose 1 MG;  Start 12/2/17 at 21:00


Nifedipine (Procardia Xl) 30 mg DAILY PO  Last administered on 12/9/17at 08:58; 

Admin Dose 30 MG;  Start 12/4/17 at 09:00


Silver Sulfadiazine (Thermazene 1% 25 Gm) 1 applic BID TOP  Last administered 

on 12/9/17at 09:04; Admin Dose 1 APPLIC;  Start 12/5/17 at 21:00


Docusate Sodium (Colace) 100 mg BID  PRN PO Constipation Last administered on 12 /5/17at 21:08; Admin Dose 100 MG;  Start 12/5/17 at 18:00


Senna (Senokot) 1 tab BID  PRN PO Constipation Last administered on 12/5/17at 21

:08; Admin Dose 1 TAB;  Start 12/5/17 at 18:00


Doxycycline Hyclate (Vibramycin) 100 mg BID PO  Last administered on 12/9/17at 

08:59; Admin Dose 100 MG;  Start 12/7/17 at 21:00


Citric Acid/ Sodium Citrate (Bicitra) 30 ml BID PO  Last administered on 12/9/ 17at 08:57; Admin Dose 30 ML;  Start 12/8/17 at 21:00


Gabapentin (Neurontin) 300 mg TID PO  Last administered on 12/9/17at 08:57; 

Admin Dose 300 MG;  Start 12/8/17 at 21:00











NEERAJ SERNA Dec 9, 2017 10:59

## 2017-12-10 VITALS — SYSTOLIC BLOOD PRESSURE: 140 MMHG | RESPIRATION RATE: 20 BRPM | HEART RATE: 71 BPM | DIASTOLIC BLOOD PRESSURE: 75 MMHG

## 2017-12-10 VITALS — RESPIRATION RATE: 19 BRPM | SYSTOLIC BLOOD PRESSURE: 116 MMHG | DIASTOLIC BLOOD PRESSURE: 64 MMHG

## 2017-12-10 VITALS — DIASTOLIC BLOOD PRESSURE: 79 MMHG | RESPIRATION RATE: 19 BRPM | SYSTOLIC BLOOD PRESSURE: 135 MMHG

## 2017-12-10 VITALS — RESPIRATION RATE: 19 BRPM | SYSTOLIC BLOOD PRESSURE: 145 MMHG | DIASTOLIC BLOOD PRESSURE: 77 MMHG

## 2017-12-10 VITALS — HEART RATE: 76 BPM

## 2017-12-10 VITALS — RESPIRATION RATE: 19 BRPM | SYSTOLIC BLOOD PRESSURE: 133 MMHG | DIASTOLIC BLOOD PRESSURE: 69 MMHG

## 2017-12-10 VITALS — DIASTOLIC BLOOD PRESSURE: 75 MMHG | RESPIRATION RATE: 20 BRPM | SYSTOLIC BLOOD PRESSURE: 140 MMHG

## 2017-12-10 VITALS — HEART RATE: 71 BPM

## 2017-12-10 VITALS — HEART RATE: 75 BPM

## 2017-12-10 VITALS — HEART RATE: 69 BPM

## 2017-12-10 VITALS — HEART RATE: 64 BPM

## 2017-12-10 LAB
ANION GAP SERPL CALC-SCNC: 13 MMOL/L (ref 8–16)
BASOPHILS # BLD AUTO: 0.1 10^3/UL (ref 0–0.1)
BASOPHILS NFR BLD: 0.7 % (ref 0–2)
BUN SERPL-MCNC: 34 MG/DL (ref 7–20)
CALCIUM SERPL-MCNC: 8.9 MG/DL (ref 8.4–10.2)
CHLORIDE SERPL-SCNC: 112 MMOL/L (ref 97–110)
CO2 SERPL-SCNC: 21 MMOL/L (ref 21–31)
CREAT SERPL-MCNC: 3.17 MG/DL (ref 0.61–1.24)
EOSINOPHIL # BLD: 0.6 10^3/UL (ref 0–0.5)
EOSINOPHIL NFR BLD: 7.8 % (ref 0–7)
ERYTHROCYTE [DISTWIDTH] IN BLOOD BY AUTOMATED COUNT: 13.2 % (ref 11.5–14.5)
GLUCOSE SERPL-MCNC: 98 MG/DL (ref 70–220)
HCT VFR BLD CALC: 27.3 % (ref 42–52)
HGB BLD-MCNC: 9.7 G/DL (ref 14–18)
LYMPHOCYTES # BLD AUTO: 2.6 10^3/UL (ref 0.8–2.9)
LYMPHOCYTES NFR BLD AUTO: 34.4 % (ref 15–51)
MCH RBC QN AUTO: 30.8 PG (ref 29–33)
MCHC RBC AUTO-ENTMCNC: 35.5 G/DL (ref 32–37)
MCV RBC AUTO: 86.7 FL (ref 82–101)
MONOCYTES # BLD: 0.7 10^3/UL (ref 0.3–0.9)
MONOCYTES NFR BLD: 8.8 % (ref 0–11)
NEUTROPHILS # BLD: 3.6 10^3/UL (ref 1.6–7.5)
NEUTROPHILS NFR BLD AUTO: 47.9 % (ref 39–77)
NRBC # BLD MANUAL: 0 10^3/UL (ref 0–0)
NRBC BLD AUTO-RTO: 0 /100WBC (ref 0–0)
PLATELET # BLD: 183 10^3/UL (ref 140–415)
PMV BLD AUTO: 12.2 FL (ref 7.4–10.4)
POTASSIUM SERPL-SCNC: 4.6 MMOL/L (ref 3.5–5.1)
RBC # BLD AUTO: 3.15 10^6/UL (ref 4.7–6.1)
SODIUM SERPL-SCNC: 141 MMOL/L (ref 135–144)
WBC # BLD AUTO: 7.5 10^3/UL (ref 4.8–10.8)

## 2017-12-10 RX ADMIN — SODIUM CITRATE AND CITRIC ACID MONOHYDRATE SCH ML: 500; 334 SOLUTION ORAL at 08:17

## 2017-12-10 RX ADMIN — SILVER SULFADIAZINE SCH APPLIC: 10 CREAM TOPICAL at 20:58

## 2017-12-10 RX ADMIN — LEVOTHYROXINE SODIUM SCH MCG: 75 TABLET ORAL at 06:03

## 2017-12-10 RX ADMIN — GABAPENTIN SCH MG: 300 CAPSULE ORAL at 12:28

## 2017-12-10 RX ADMIN — Medication SCH EACH: at 08:17

## 2017-12-10 RX ADMIN — DOXYCYCLINE HYCLATE SCH MG: 100 TABLET, FILM COATED ORAL at 20:43

## 2017-12-10 RX ADMIN — HEPARIN SODIUM SCH UNIT: 5000 INJECTION, SOLUTION INTRAVENOUS; SUBCUTANEOUS at 14:55

## 2017-12-10 RX ADMIN — GABAPENTIN SCH MG: 300 CAPSULE ORAL at 08:17

## 2017-12-10 RX ADMIN — GABAPENTIN SCH MG: 300 CAPSULE ORAL at 20:44

## 2017-12-10 RX ADMIN — Medication SCH EACH: at 20:51

## 2017-12-10 RX ADMIN — SODIUM CITRATE AND CITRIC ACID MONOHYDRATE SCH ML: 500; 334 SOLUTION ORAL at 20:45

## 2017-12-10 RX ADMIN — NIFEDIPINE SCH MG: 60 TABLET, FILM COATED, EXTENDED RELEASE ORAL at 08:18

## 2017-12-10 RX ADMIN — HEPARIN SODIUM SCH UNIT: 5000 INJECTION, SOLUTION INTRAVENOUS; SUBCUTANEOUS at 20:57

## 2017-12-10 RX ADMIN — HEPARIN SODIUM SCH UNIT: 5000 INJECTION, SOLUTION INTRAVENOUS; SUBCUTANEOUS at 06:10

## 2017-12-10 RX ADMIN — OMEGA-3 FATTY ACIDS CAP DELAYED RELEASE 1000 MG SCH MG: 1000 CAPSULE DELAYED RELEASE at 08:17

## 2017-12-10 RX ADMIN — TAMSULOSIN HYDROCHLORIDE SCH MG: 0.4 CAPSULE ORAL at 08:17

## 2017-12-10 RX ADMIN — TERAZOSIN HYDROCHLORIDE ANHYDROUS SCH MG: 1 CAPSULE ORAL at 21:00

## 2017-12-10 RX ADMIN — OMEGA-3 FATTY ACIDS CAP DELAYED RELEASE 1000 MG SCH MG: 1000 CAPSULE DELAYED RELEASE at 20:43

## 2017-12-10 RX ADMIN — ATORVASTATIN CALCIUM SCH MG: 40 TABLET, FILM COATED ORAL at 20:44

## 2017-12-10 RX ADMIN — SILVER SULFADIAZINE SCH APPLIC: 10 CREAM TOPICAL at 08:22

## 2017-12-10 RX ADMIN — DOXYCYCLINE HYCLATE SCH MG: 100 TABLET, FILM COATED ORAL at 08:17

## 2017-12-10 NOTE — PN
Date/Time of Note


Date/Time of Note


DATE: 12/10/17 


TIME: 09:38





Assessment/Plan


VTE Prophylaxis


VTE Prophylaxis Intervention:  SCD's





Lines/Catheters


IV Catheter Type (from [facility]):  Saline Lock


Urinary Cath still in place:  Yes





Assessment/Plan


Chief Complaint/Hosp Course


Assessment and plan





1.  Acute kidney injury..  Nephrologist following.  Medications to be renally 

dosed.  Monitor for improvement.  Continue with nephrologist 

recommendations.Patient did have renal ultrasound showing moderate enlarged 

prostate with impression on posterior inferior bladder with bladder distention.

  f/u urologist recs. schroeder  was d/c but still with residual. 





2.  Left lower extremity cellulitis.  Continue on antibiotics per ID 

recommendations. improved





3.  BPH.  Continue Flomax and terazosin.  Schroeder catheter in place.  continue 

with urologist recs 





4.  Normocytic normochromic anemia.  Noted with iron deficiency.  Continue iron 

supplement. stable 





5.  Hypertension.  Continue antihypertensives and adjust needed.stable





6.  Diabetes.  Continue insulin regimen.  A1c noted at 6.3. stable





7.  Hypothyroidism.  Continue Synthroid





8.  Dyslipidemia.  Continue on statin medication





9.  Diabetic neuropathy.  Continue on Neurontin





10. Hyponatremia. monitor trend. IVF per nephrology





Disposition and plan:  still with some residual after schroeder d/c. f/u urology 

recs. renal function slowly improving. d/c when medically stable and cleared by 

consultants 





Discussed plan of care with Dr. Giraldo


Problems:  





Subjective


24 Hr Interval Summary


Free Text/Dictation


reportedly not voiding. no other specific complaints.





Exam/Review of Systems


Vital Signs


Vitals





 Vital Signs








  Date Time  Temp Pulse Resp B/P Pulse Ox O2 Delivery O2 Flow Rate FiO2


 


12/10/17 08:21 98.1 74 19 145/77 98   














 Intake and Output   


 


 12/9/17 12/9/17 12/10/17





 14:59 22:59 06:59


 


Intake Total  1600 ml 350 ml


 


Output Total 2250 ml 781 ml 1450 ml


 


Balance -2250 ml 819 ml -1100 ml











Exam


Constitutional:  alert, oriented


Psych:  nl mood/affect


Head:  normocephalic


Eyes:  nl conjunctiva


Neck:  non-tender, supple


Respiratory:  clear to auscultation, normal air movement


Cardiovascular:  regular rate and rhythm


Gastrointestinal:  non-tender, soft


: schroeder in place


Musculoskeletal:  No swelling


Neurological:  CNS II-XII intact, nl mental status, nl speech


Skin: less redness LLE, improving





Results


Result Diagram:  


12/10/17 0544                                                                  

              12/10/17 0544





Results 24 hrs





Laboratory Tests








Test


  12/9/17


11:31 12/9/17


17:33 12/9/17


22:01 12/10/17


05:44


 


Bedside Glucose 137   134   118   


 


White Blood Count    7.5  


 


Red Blood Count    3.15  L


 


Hemoglobin    9.7  L


 


Hematocrit    27.3  L


 


Mean Corpuscular Volume    86.7  


 


Mean Corpuscular Hemoglobin    30.8  


 


Mean Corpuscular Hemoglobin


Concent 


  


  


  35.5  


 


 


Red Cell Distribution Width    13.2  


 


Platelet Count    183  #


 


Mean Platelet Volume    12.2  H


 


Neutrophils %    47.9  


 


Lymphocytes %    34.4  


 


Monocytes %    8.8  


 


Eosinophils %    7.8  H


 


Basophils %    0.7  


 


Nucleated Red Blood Cells %    0.0  


 


Neutrophils #    3.6  


 


Lymphocytes #    2.6  


 


Monocytes #    0.7  


 


Eosinophils #    0.6  H


 


Basophils #    0.1  


 


Nucleated Red Blood Cells #    0.0  


 


Sodium Level    141  


 


Potassium Level    4.6  


 


Chloride Level    112  H


 


Carbon Dioxide Level    21  


 


Anion Gap    13  


 


Blood Urea Nitrogen    34  H


 


Creatinine    3.17  H


 


Glucose Level    98  


 


Calcium Level    8.9  














Test


  12/10/17


07:35 


  


  


 


 


Bedside Glucose 98     











Medications


Medications





 Current Medications


Ondansetron HCl (Zofran Tab) 4 mg Q6H  PRN PO NAUSEA AND/OR VOMITING;  Start 12/ 1/17 at 23:00


Acetaminophen (Tylenol Tab) 650 mg Q6H  PRN PO PAIN LEVEL 1-3 OR FEVER;  Start 

12/1/17 at 23:00


Acetaminophen/ Hydrocodone Bitart (Norco (5/325)) 1 tab Q6H  PRN PO PAIN LEVEL 4

-6;  Start 12/1/17 at 23:00


Heparin Sodium (Porcine) (Heparin  (5000 Units/0.5 ml)) 5,000 unit Q8 SC  Last 

administered on 12/10/17at 06:10; Admin Dose 5,000 UNIT;  Start 12/1/17 at 23:00


Atorvastatin Calcium (Lipitor) 40 mg QHS PO  Last administered on 12/9/17at 21:

57; Admin Dose 40 MG;  Start 12/1/17 at 23:00


Carvedilol (Coreg) 12.5 mg BID PO  Last administered on 12/10/17at 08:18; Admin 

Dose 12.5 MG;  Start 12/1/17 at 23:00


Hydralazine HCl (Apresoline) 10 mg Q4H  PRN IV ELEVATED SYSTOLIC BP Last 

administered on 12/2/17at 08:41; Admin Dose 10 MG;  Start 12/1/17 at 23:00


Diagnostic Test (Pha) (Accu-Chek) 1 ea 02 XX  Last administered on 12/3/17at 02:

49; Admin Dose 1 EA;  Start 12/2/17 at 02:00


Miscellaneous Information 1 ea NOTE XX ;  Start 12/1/17 at 23:00


Glucose (Glutose) 15 gm Q15M  PRN PO DECREASED GLUCOSE;  Start 12/1/17 at 23:00


Glucose (Glutose) 22.5 gm Q15M  PRN PO DECREASED GLUCOSE;  Start 12/1/17 at 23:

00


Dextrose (D50w Syringe) 25 ml Q15M  PRN IV DECREASED GLUCOSE;  Start 12/1/17 at 

23:00


Dextrose (D50w Syringe) 50 ml Q15M  PRN IV DECREASED GLUCOSE;  Start 12/1/17 at 

23:00


Glucagon (Glucagen) 1 mg Q15M  PRN IM DECREASED GLUCOSE;  Start 12/1/17 at 23:00


Glucose (Glutose) 15 gm Q15M  PRN BUCCAL DECREASED GLUCOSE;  Start 12/1/17 at 23

:00


Fish Oil (Fish Oil) 1,000 mg BID PO  Last administered on 12/10/17at 08:17; 

Admin Dose 1,000 MG;  Start 12/2/17 at 09:00


Lactobacillus Acidophilus (Florajen3 Capsule) 1 each BID PO  Last administered 

on 12/10/17at 08:17; Admin Dose 1 EACH;  Start 12/2/17 at 09:00


Tamsulosin HCl (Flomax) 0.4 mg DAILY PO  Last administered on 12/10/17at 08:17; 

Admin Dose 0.4 MG;  Start 12/3/17 at 09:00


Terazosin HCl (Hytrin) 1 mg HS PO  Last administered on 12/9/17at 21:58; Admin 

Dose 1 MG;  Start 12/2/17 at 21:00


Nifedipine (Procardia Xl) 30 mg DAILY PO  Last administered on 12/10/17at 08:18

; Admin Dose 30 MG;  Start 12/4/17 at 09:00


Silver Sulfadiazine (Thermazene 1% 25 Gm) 1 applic BID TOP  Last administered 

on 12/10/17at 08:22; Admin Dose 1 APPLIC;  Start 12/5/17 at 21:00


Docusate Sodium (Colace) 100 mg BID  PRN PO Constipation Last administered on 12 /5/17at 21:08; Admin Dose 100 MG;  Start 12/5/17 at 18:00


Senna (Senokot) 1 tab BID  PRN PO Constipation Last administered on 12/5/17at 21

:08; Admin Dose 1 TAB;  Start 12/5/17 at 18:00


Doxycycline Hyclate (Vibramycin) 100 mg BID PO  Last administered on 12/10/17at 

08:17; Admin Dose 100 MG;  Start 12/7/17 at 21:00


Citric Acid/ Sodium Citrate (Bicitra) 30 ml BID PO  Last administered on 12/10/

17at 08:17; Admin Dose 30 ML;  Start 12/8/17 at 21:00


Gabapentin (Neurontin) 300 mg TID PO  Last administered on 12/10/17at 08:17; 

Admin Dose 300 MG;  Start 12/8/17 at 21:00











MARGIE WALTON Dec 10, 2017 09:40

## 2017-12-10 NOTE — CONS
Date/Time of Note


Date/Time of Note


DATE: 12/10/17 


TIME: 18:41





Assessment/Plan


Assessment/Plan


Chief Complaint/Hosp Course


ID PROGRESS NOTE


CURRENT ABX:  =>Doxycycline #4


s/p Clindamycin x7 days


* Was not able to urinate  after FC DC'd -> It was replaced, now CT ABD on order


* No complaints LLEXT cellulitis improving. 


* MICRO: bcX (-); uRINE cX (-) 


----------------------------------------------------





PHYSICAL EXAMINATION:


GENERAL:  Overweight M, VSS, NAD 


HEENT: Unremarkable 


NECK:  Supple, trach midline


CHEST:  Equal chest rise bilaterally, no distress


HEART: RRR pulse 


ABDOMEN:  Soft


EXT: Warm, no edema, moves extremities 


SKIN: No rash, no diaphoresis 





ID ASSESSMENT:


56 yo obese M admit with:


1.  Left lower extremity cellulitis superimposed on LLEXT edema => IMPROVING 


* Continue topical Silvadene 


* 12/1/17 Venous US: No sonographic evidence for deep venous thrombosis.


2.  Acute kidney injury, possibly on chronic kidney disease.


3.  BPH w/lower urinary tract sxs=> Urinary retention, started on Flomax 


4.  Diabetes.


5.  Hypertension.


ABX ALLERGIES: NKDA


INVASIVES: PIV 


CURRENT ABX:  =>Doxycycline #4


s/p Clindamycin x7 days


ID RECOMMENDATIONS/PLAN:  


1.  Patient reports cellulitis is improving => When cleared for DC, may DC home 

w/Rx Doxycycline 100mg PO BID x 7 days 





.





.


Problems:  





Consultation Date/Type/Reason


Admit Date/Time


Dec 1, 2017 at 22:22


Initial Consult Date


12/5/17


Type of Consultation:  id


Referring Provider:  MARCOS BOYLE





Exam/Review of Systems


Vital Signs


Vitals





 Vital Signs








  Date Time  Temp Pulse Resp B/P Pulse Ox O2 Delivery O2 Flow Rate FiO2


 


12/10/17 16:06 97.9 72 19 133/69 97   














 Intake and Output   


 


 12/9/17 12/9/17 12/10/17





 15:00 23:00 07:00


 


Intake Total  1600 ml 350 ml


 


Output Total 2250 ml 781 ml 1450 ml


 


Balance -2250 ml 819 ml -1100 ml











Results


Result Diagram:  


12/10/17 0544                                                                  

              12/10/17 0544





Results 24 hrs





Laboratory Tests








Test


  12/9/17


22:01 12/10/17


05:44 12/10/17


07:35 12/10/17


11:37


 


Bedside Glucose 118    98   142  


 


White Blood Count  7.5    


 


Red Blood Count  3.15  L  


 


Hemoglobin  9.7  L  


 


Hematocrit  27.3  L  


 


Mean Corpuscular Volume  86.7    


 


Mean Corpuscular Hemoglobin  30.8    


 


Mean Corpuscular Hemoglobin


Concent 


  35.5  


  


  


 


 


Red Cell Distribution Width  13.2    


 


Platelet Count  183  #  


 


Mean Platelet Volume  12.2  H  


 


Neutrophils %  47.9    


 


Lymphocytes %  34.4    


 


Monocytes %  8.8    


 


Eosinophils %  7.8  H  


 


Basophils %  0.7    


 


Nucleated Red Blood Cells %  0.0    


 


Neutrophils #  3.6    


 


Lymphocytes #  2.6    


 


Monocytes #  0.7    


 


Eosinophils #  0.6  H  


 


Basophils #  0.1    


 


Nucleated Red Blood Cells #  0.0    


 


Sodium Level  141    


 


Potassium Level  4.6    


 


Chloride Level  112  H  


 


Carbon Dioxide Level  21    


 


Anion Gap  13    


 


Blood Urea Nitrogen  34  H  


 


Creatinine  3.17  H  


 


Glucose Level  98    


 


Calcium Level  8.9    














Test


  12/10/17


17:18 


  


  


 


 


Bedside Glucose 148     











Medications


Medications





 Current Medications


Ondansetron HCl (Zofran Tab) 4 mg Q6H  PRN PO NAUSEA AND/OR VOMITING;  Start 12/ 1/17 at 23:00


Acetaminophen (Tylenol Tab) 650 mg Q6H  PRN PO PAIN LEVEL 1-3 OR FEVER;  Start 

12/1/17 at 23:00


Acetaminophen/ Hydrocodone Bitart (Norco (5/325)) 1 tab Q6H  PRN PO PAIN LEVEL 4

-6;  Start 12/1/17 at 23:00


Heparin Sodium (Porcine) (Heparin  (5000 Units/0.5 ml)) 5,000 unit Q8 SC  Last 

administered on 12/10/17at 14:55; Admin Dose 5,000 UNIT;  Start 12/1/17 at 23:00


Atorvastatin Calcium (Lipitor) 40 mg QHS PO  Last administered on 12/9/17at 21:

57; Admin Dose 40 MG;  Start 12/1/17 at 23:00


Carvedilol (Coreg) 12.5 mg BID PO  Last administered on 12/10/17at 08:18; Admin 

Dose 12.5 MG;  Start 12/1/17 at 23:00


Hydralazine HCl (Apresoline) 10 mg Q4H  PRN IV ELEVATED SYSTOLIC BP Last 

administered on 12/2/17at 08:41; Admin Dose 10 MG;  Start 12/1/17 at 23:00


Diagnostic Test (Pha) (Accu-Chek) 1 ea 02 XX  Last administered on 12/3/17at 02:

49; Admin Dose 1 EA;  Start 12/2/17 at 02:00


Miscellaneous Information 1 ea NOTE XX ;  Start 12/1/17 at 23:00


Glucose (Glutose) 15 gm Q15M  PRN PO DECREASED GLUCOSE;  Start 12/1/17 at 23:00


Glucose (Glutose) 22.5 gm Q15M  PRN PO DECREASED GLUCOSE;  Start 12/1/17 at 23:

00


Dextrose (D50w Syringe) 25 ml Q15M  PRN IV DECREASED GLUCOSE;  Start 12/1/17 at 

23:00


Dextrose (D50w Syringe) 50 ml Q15M  PRN IV DECREASED GLUCOSE;  Start 12/1/17 at 

23:00


Glucagon (Glucagen) 1 mg Q15M  PRN IM DECREASED GLUCOSE;  Start 12/1/17 at 23:00


Glucose (Glutose) 15 gm Q15M  PRN BUCCAL DECREASED GLUCOSE;  Start 12/1/17 at 23

:00


Fish Oil (Fish Oil) 1,000 mg BID PO  Last administered on 12/10/17at 08:17; 

Admin Dose 1,000 MG;  Start 12/2/17 at 09:00


Lactobacillus Acidophilus (Florajen3 Capsule) 1 each BID PO  Last administered 

on 12/10/17at 08:17; Admin Dose 1 EACH;  Start 12/2/17 at 09:00


Tamsulosin HCl (Flomax) 0.4 mg DAILY PO  Last administered on 12/10/17at 08:17; 

Admin Dose 0.4 MG;  Start 12/3/17 at 09:00


Terazosin HCl (Hytrin) 1 mg HS PO  Last administered on 12/9/17at 21:58; Admin 

Dose 1 MG;  Start 12/2/17 at 21:00


Nifedipine (Procardia Xl) 30 mg DAILY PO  Last administered on 12/10/17at 08:18

; Admin Dose 30 MG;  Start 12/4/17 at 09:00


Silver Sulfadiazine (Thermazene 1% 25 Gm) 1 applic BID TOP  Last administered 

on 12/10/17at 08:22; Admin Dose 1 APPLIC;  Start 12/5/17 at 21:00


Docusate Sodium (Colace) 100 mg BID  PRN PO Constipation Last administered on 12 /5/17at 21:08; Admin Dose 100 MG;  Start 12/5/17 at 18:00


Senna (Senokot) 1 tab BID  PRN PO Constipation Last administered on 12/5/17at 21

:08; Admin Dose 1 TAB;  Start 12/5/17 at 18:00


Doxycycline Hyclate (Vibramycin) 100 mg BID PO  Last administered on 12/10/17at 

08:17; Admin Dose 100 MG;  Start 12/7/17 at 21:00


Citric Acid/ Sodium Citrate (Bicitra) 30 ml BID PO  Last administered on 12/10/

17at 08:17; Admin Dose 30 ML;  Start 12/8/17 at 21:00


Gabapentin (Neurontin) 300 mg TID PO  Last administered on 12/10/17at 12:28; 

Admin Dose 300 MG;  Start 12/8/17 at 21:00











FLORI PRESLEY NP Dec 10, 2017 18:42

## 2017-12-10 NOTE — CONS
Date/Time of Note


Date/Time of Note


DATE: 12/10/17 


TIME: 10:43





Assessment/Plan


Assessment/Plan


Additional Assessment/Plan


1. Non Oliguric Acute kidney injury vs acute kidney injury on CKD 


2. BPH with urinary retention s/p Shaw catheter placement 


4. H/o possible CKD due to DM nephropathy, pt is not aware about it or has not 

seen any nephrologist as outpatient, I doubt it 


5. DM II


6. HTN


7. Anemia combination of iron deficiency anemia and anemia of chronic renal 

disease 


8. LE significant edema, US negative for DVT, ECHO showed EF 60% with stage I 

diastoilc dysfunction 


9. Accelerated HTN





Plan:


-BUN/Cr still remains elevated-34/3.17


-Na 136 TODAY 


-monitor Renal function and electrolyses 


-Bp stable with  Procardia Xl to 30mg po daily and use IV hydralazine prn SBP> 

150 mm Hg 


-we will continue to follow


- Rafy Tomas





Consultation Date/Type/Reason


Admit Date/Time


Dec 1, 2017 at 22:22


Initial Consult Date


12/5/17


Type of Consultation:  urology


Referring Provider:  MARCOS BOYLE





24 HR Interval Summary


Free Text/Dictation


- patient retained almost 900 cc urine last  night per staff- PVR checked few 

times


- staff checked PVR= 200 cc today at 10:00- Fc reinserted today-Dr Corral 

follows-feels better now, denies any complaints


- afebrile


- BUN/Cr still remains elevated-34/3.17





Rafy Tomas/staff.





Detailed Summary


Respiratory:  no complaints


Cardiovascular:  no complaints


Gastrointestinal:  no complaints


Genitourinary:  other (urine retention)


Musculoskeletal:  no complaints


Skin:  no complaints





Exam/Review of Systems


Vital Signs


Vitals





 Vital Signs








  Date Time  Temp Pulse Resp B/P Pulse Ox O2 Delivery O2 Flow Rate FiO2


 


12/10/17 08:21 98.1 74 19 145/77 98   














 Intake and Output   


 


 12/9/17 12/9/17 12/10/17





 14:59 22:59 06:59


 


Intake Total  1600 ml 350 ml


 


Output Total 2250 ml 781 ml 1450 ml


 


Balance -2250 ml 819 ml -1100 ml











Exam


Constitutional:  alert, oriented, well developed


Respiratory:  clear to auscultation, normal air movement


Cardiovascular:  nl pulses, other (SR 60 )


Gastrointestinal:  non-tender, soft


Musculoskeletal:  nl extremities to inspection


Extremities:  normal pulses


Neurological:  nl mental status, nl speech





Results


Result Diagram:  


12/10/17 0544                                                                  

              12/10/17 0544





Results 24 hrs





Laboratory Tests








Test


  12/9/17


11:31 12/9/17


17:33 12/9/17


22:01 12/10/17


05:44


 


Bedside Glucose 137   134   118   


 


White Blood Count    7.5  


 


Red Blood Count    3.15  L


 


Hemoglobin    9.7  L


 


Hematocrit    27.3  L


 


Mean Corpuscular Volume    86.7  


 


Mean Corpuscular Hemoglobin    30.8  


 


Mean Corpuscular Hemoglobin


Concent 


  


  


  35.5  


 


 


Red Cell Distribution Width    13.2  


 


Platelet Count    183  #


 


Mean Platelet Volume    12.2  H


 


Neutrophils %    47.9  


 


Lymphocytes %    34.4  


 


Monocytes %    8.8  


 


Eosinophils %    7.8  H


 


Basophils %    0.7  


 


Nucleated Red Blood Cells %    0.0  


 


Neutrophils #    3.6  


 


Lymphocytes #    2.6  


 


Monocytes #    0.7  


 


Eosinophils #    0.6  H


 


Basophils #    0.1  


 


Nucleated Red Blood Cells #    0.0  


 


Sodium Level    141  


 


Potassium Level    4.6  


 


Chloride Level    112  H


 


Carbon Dioxide Level    21  


 


Anion Gap    13  


 


Blood Urea Nitrogen    34  H


 


Creatinine    3.17  H


 


Glucose Level    98  


 


Calcium Level    8.9  














Test


  12/10/17


07:35 


  


  


 


 


Bedside Glucose 98     











Medications


Medications





 Current Medications


Ondansetron HCl (Zofran Tab) 4 mg Q6H  PRN PO NAUSEA AND/OR VOMITING;  Start 12/ 1/17 at 23:00


Acetaminophen (Tylenol Tab) 650 mg Q6H  PRN PO PAIN LEVEL 1-3 OR FEVER;  Start 

12/1/17 at 23:00


Acetaminophen/ Hydrocodone Bitart (Norco (5/325)) 1 tab Q6H  PRN PO PAIN LEVEL 4

-6;  Start 12/1/17 at 23:00


Heparin Sodium (Porcine) (Heparin  (5000 Units/0.5 ml)) 5,000 unit Q8 SC  Last 

administered on 12/10/17at 06:10; Admin Dose 5,000 UNIT;  Start 12/1/17 at 23:00


Atorvastatin Calcium (Lipitor) 40 mg QHS PO  Last administered on 12/9/17at 21:

57; Admin Dose 40 MG;  Start 12/1/17 at 23:00


Carvedilol (Coreg) 12.5 mg BID PO  Last administered on 12/10/17at 08:18; Admin 

Dose 12.5 MG;  Start 12/1/17 at 23:00


Hydralazine HCl (Apresoline) 10 mg Q4H  PRN IV ELEVATED SYSTOLIC BP Last 

administered on 12/2/17at 08:41; Admin Dose 10 MG;  Start 12/1/17 at 23:00


Diagnostic Test (Pha) (Accu-Chek) 1 ea 02 XX  Last administered on 12/3/17at 02:

49; Admin Dose 1 EA;  Start 12/2/17 at 02:00


Miscellaneous Information 1 ea NOTE XX ;  Start 12/1/17 at 23:00


Glucose (Glutose) 15 gm Q15M  PRN PO DECREASED GLUCOSE;  Start 12/1/17 at 23:00


Glucose (Glutose) 22.5 gm Q15M  PRN PO DECREASED GLUCOSE;  Start 12/1/17 at 23:

00


Dextrose (D50w Syringe) 25 ml Q15M  PRN IV DECREASED GLUCOSE;  Start 12/1/17 at 

23:00


Dextrose (D50w Syringe) 50 ml Q15M  PRN IV DECREASED GLUCOSE;  Start 12/1/17 at 

23:00


Glucagon (Glucagen) 1 mg Q15M  PRN IM DECREASED GLUCOSE;  Start 12/1/17 at 23:00


Glucose (Glutose) 15 gm Q15M  PRN BUCCAL DECREASED GLUCOSE;  Start 12/1/17 at 23

:00


Fish Oil (Fish Oil) 1,000 mg BID PO  Last administered on 12/10/17at 08:17; 

Admin Dose 1,000 MG;  Start 12/2/17 at 09:00


Lactobacillus Acidophilus (Florajen3 Capsule) 1 each BID PO  Last administered 

on 12/10/17at 08:17; Admin Dose 1 EACH;  Start 12/2/17 at 09:00


Tamsulosin HCl (Flomax) 0.4 mg DAILY PO  Last administered on 12/10/17at 08:17; 

Admin Dose 0.4 MG;  Start 12/3/17 at 09:00


Terazosin HCl (Hytrin) 1 mg HS PO  Last administered on 12/9/17at 21:58; Admin 

Dose 1 MG;  Start 12/2/17 at 21:00


Nifedipine (Procardia Xl) 30 mg DAILY PO  Last administered on 12/10/17at 08:18

; Admin Dose 30 MG;  Start 12/4/17 at 09:00


Silver Sulfadiazine (Thermazene 1% 25 Gm) 1 applic BID TOP  Last administered 

on 12/10/17at 08:22; Admin Dose 1 APPLIC;  Start 12/5/17 at 21:00


Docusate Sodium (Colace) 100 mg BID  PRN PO Constipation Last administered on 12 /5/17at 21:08; Admin Dose 100 MG;  Start 12/5/17 at 18:00


Senna (Senokot) 1 tab BID  PRN PO Constipation Last administered on 12/5/17at 21

:08; Admin Dose 1 TAB;  Start 12/5/17 at 18:00


Doxycycline Hyclate (Vibramycin) 100 mg BID PO  Last administered on 12/10/17at 

08:17; Admin Dose 100 MG;  Start 12/7/17 at 21:00


Citric Acid/ Sodium Citrate (Bicitra) 30 ml BID PO  Last administered on 12/10/

17at 08:17; Admin Dose 30 ML;  Start 12/8/17 at 21:00


Gabapentin (Neurontin) 300 mg TID PO  Last administered on 12/10/17at 08:17; 

Admin Dose 300 MG;  Start 12/8/17 at 21:00











NEERAJ SERNA Dec 10, 2017 10:59

## 2017-12-10 NOTE — CONS
Date/Time of Note


Date/Time of Note


DATE: 12/10/17 


TIME: 16:12





Consult Date/Type/Reason


Admit Date/Time


Dec 1, 2017 at 22:22


Initial Consult Date


12/5/17


Type of Consultation:  urology


Reason for Consultation


Urinary retention


Ordering Provider:  MARCOS BOYLE





Subjective


Patient stated that he is comfortable at the present.  One the Shaw catheter 

was removed he was not able to empty his bladder.  He voided small amount and 

the postvoid residual was over 700 mL





Objective





 Vital Signs








  Date Time  Temp Pulse Resp B/P Pulse Ox O2 Delivery O2 Flow Rate FiO2


 


12/10/17 16:06 97.9 72 19 133/69 97   














 Intake and Output   


 


 12/9/17 12/9/17 12/10/17





 15:00 23:00 07:00


 


Intake Total  1600 ml 350 ml


 


Output Total 2250 ml 781 ml 1450 ml


 


Balance -2250 ml 819 ml -1100 ml








Exam


Patient is awake alert, denies having any pain.  The Shaw catheter is in place 

and draining clear urine.  The Shaw catheter was removed earlier and the 

patient was not able to urinate and the bladder scan showed over 700 mL 

therefore a new Shaw catheter was put in and kept in.  The PSA is 3.0.  The 

renal ultrasound that was done showed an enlarged prostate.





Results/Medications


Result Diagram:  


12/10/17 0544                                                                  

              12/10/17 0544





Results 24 hrs





Laboratory Tests








Test


  12/9/17


17:33 12/9/17


22:01 12/10/17


05:44 12/10/17


07:35


 


Bedside Glucose 134   118    98  


 


White Blood Count   7.5   


 


Red Blood Count   3.15  L 


 


Hemoglobin   9.7  L 


 


Hematocrit   27.3  L 


 


Mean Corpuscular Volume   86.7   


 


Mean Corpuscular Hemoglobin   30.8   


 


Mean Corpuscular Hemoglobin


Concent 


  


  35.5  


  


 


 


Red Cell Distribution Width   13.2   


 


Platelet Count   183  # 


 


Mean Platelet Volume   12.2  H 


 


Neutrophils %   47.9   


 


Lymphocytes %   34.4   


 


Monocytes %   8.8   


 


Eosinophils %   7.8  H 


 


Basophils %   0.7   


 


Nucleated Red Blood Cells %   0.0   


 


Neutrophils #   3.6   


 


Lymphocytes #   2.6   


 


Monocytes #   0.7   


 


Eosinophils #   0.6  H 


 


Basophils #   0.1   


 


Nucleated Red Blood Cells #   0.0   


 


Sodium Level   141   


 


Potassium Level   4.6   


 


Chloride Level   112  H 


 


Carbon Dioxide Level   21   


 


Anion Gap   13   


 


Blood Urea Nitrogen   34  H 


 


Creatinine   3.17  H 


 


Glucose Level   98   


 


Calcium Level   8.9   














Test


  12/10/17


11:37 


  


  


 


 


Bedside Glucose 142     








Medications





 Current Medications


Ondansetron HCl (Zofran Tab) 4 mg Q6H  PRN PO NAUSEA AND/OR VOMITING;  Start 12/ 1/17 at 23:00


Acetaminophen (Tylenol Tab) 650 mg Q6H  PRN PO PAIN LEVEL 1-3 OR FEVER;  Start 

12/1/17 at 23:00


Acetaminophen/ Hydrocodone Bitart (Norco (5/325)) 1 tab Q6H  PRN PO PAIN LEVEL 4

-6;  Start 12/1/17 at 23:00


Heparin Sodium (Porcine) (Heparin  (5000 Units/0.5 ml)) 5,000 unit Q8 SC  Last 

administered on 12/10/17at 14:55; Admin Dose 5,000 UNIT;  Start 12/1/17 at 23:00


Atorvastatin Calcium (Lipitor) 40 mg QHS PO  Last administered on 12/9/17at 21:

57; Admin Dose 40 MG;  Start 12/1/17 at 23:00


Carvedilol (Coreg) 12.5 mg BID PO  Last administered on 12/10/17at 08:18; Admin 

Dose 12.5 MG;  Start 12/1/17 at 23:00


Hydralazine HCl (Apresoline) 10 mg Q4H  PRN IV ELEVATED SYSTOLIC BP Last 

administered on 12/2/17at 08:41; Admin Dose 10 MG;  Start 12/1/17 at 23:00


Diagnostic Test (Pha) (Accu-Chek) 1 ea 02 XX  Last administered on 12/3/17at 02:

49; Admin Dose 1 EA;  Start 12/2/17 at 02:00


Miscellaneous Information 1 ea NOTE XX ;  Start 12/1/17 at 23:00


Glucose (Glutose) 15 gm Q15M  PRN PO DECREASED GLUCOSE;  Start 12/1/17 at 23:00


Glucose (Glutose) 22.5 gm Q15M  PRN PO DECREASED GLUCOSE;  Start 12/1/17 at 23:

00


Dextrose (D50w Syringe) 25 ml Q15M  PRN IV DECREASED GLUCOSE;  Start 12/1/17 at 

23:00


Dextrose (D50w Syringe) 50 ml Q15M  PRN IV DECREASED GLUCOSE;  Start 12/1/17 at 

23:00


Glucagon (Glucagen) 1 mg Q15M  PRN IM DECREASED GLUCOSE;  Start 12/1/17 at 23:00


Glucose (Glutose) 15 gm Q15M  PRN BUCCAL DECREASED GLUCOSE;  Start 12/1/17 at 23

:00


Fish Oil (Fish Oil) 1,000 mg BID PO  Last administered on 12/10/17at 08:17; 

Admin Dose 1,000 MG;  Start 12/2/17 at 09:00


Lactobacillus Acidophilus (Florajen3 Capsule) 1 each BID PO  Last administered 

on 12/10/17at 08:17; Admin Dose 1 EACH;  Start 12/2/17 at 09:00


Tamsulosin HCl (Flomax) 0.4 mg DAILY PO  Last administered on 12/10/17at 08:17; 

Admin Dose 0.4 MG;  Start 12/3/17 at 09:00


Terazosin HCl (Hytrin) 1 mg HS PO  Last administered on 12/9/17at 21:58; Admin 

Dose 1 MG;  Start 12/2/17 at 21:00


Nifedipine (Procardia Xl) 30 mg DAILY PO  Last administered on 12/10/17at 08:18

; Admin Dose 30 MG;  Start 12/4/17 at 09:00


Silver Sulfadiazine (Thermazene 1% 25 Gm) 1 applic BID TOP  Last administered 

on 12/10/17at 08:22; Admin Dose 1 APPLIC;  Start 12/5/17 at 21:00


Docusate Sodium (Colace) 100 mg BID  PRN PO Constipation Last administered on 12 /5/17at 21:08; Admin Dose 100 MG;  Start 12/5/17 at 18:00


Senna (Senokot) 1 tab BID  PRN PO Constipation Last administered on 12/5/17at 21

:08; Admin Dose 1 TAB;  Start 12/5/17 at 18:00


Doxycycline Hyclate (Vibramycin) 100 mg BID PO  Last administered on 12/10/17at 

08:17; Admin Dose 100 MG;  Start 12/7/17 at 21:00


Citric Acid/ Sodium Citrate (Bicitra) 30 ml BID PO  Last administered on 12/10/

17at 08:17; Admin Dose 30 ML;  Start 12/8/17 at 21:00


Gabapentin (Neurontin) 300 mg TID PO  Last administered on 12/10/17at 12:28; 

Admin Dose 300 MG;  Start 12/8/17 at 21:00





Assessment/Plan


Chief Complaint/Hosp Course


57-year-old male has an enlarged prostate urinary retention and acute on 

chronic renal failure. After the ultrasound of the kidney was done it 

demonstrated that the patient may have been in urinary retention, a Shaw 

catheter was put in and 500 mL drained out. he had the Shaw catheter for 5 

days however his renal function did not improve and his creatinine did go up 

instead of coming down.  The Shaw catheter was removed.  He voided small 

amount but his postvoid residual was over 700 mL so a new catheter was put in.  

His renal function still did not improve with the Shaw catheter in place.  I 

had a long discussion with him and his wife at his bedside.  Indicated to them 

that the renal function did not improve with a Shaw catheter in and that the 

kidney may have intrinsic renal disease.  Also he does have urinary retention 

and prostate enlargement.  I explained to them that one could do a 

transurethral resection of the prostate with the hope that he could urinate 

after that.  I informed them that no guarantee that he will be able to urinate 

and he may still end needing an indwelling Shaw catheter.  Another option that 

I discussed with them would be for him to learn to do self intermittent 

catheterization every 6 hours.  For now I am ordering a CT scan of the abdomen 

and pelvis without IV contrast and declined the Shaw catheter for 2 hours 

prior to the CT scan so we could see the prostate on the CT scan better and 

then we will decide if to proceed with the surgery or not.  Also that would 

allow him time to think about it and when I see him tomorrow we could probably 

make a decision.


Problems:  











PALOMA COOK MD Dec 10, 2017 16:18

## 2017-12-11 VITALS — DIASTOLIC BLOOD PRESSURE: 75 MMHG | RESPIRATION RATE: 20 BRPM | SYSTOLIC BLOOD PRESSURE: 143 MMHG

## 2017-12-11 VITALS — DIASTOLIC BLOOD PRESSURE: 76 MMHG | RESPIRATION RATE: 20 BRPM | SYSTOLIC BLOOD PRESSURE: 146 MMHG

## 2017-12-11 VITALS — SYSTOLIC BLOOD PRESSURE: 125 MMHG | RESPIRATION RATE: 20 BRPM | DIASTOLIC BLOOD PRESSURE: 73 MMHG

## 2017-12-11 VITALS — RESPIRATION RATE: 20 BRPM | SYSTOLIC BLOOD PRESSURE: 142 MMHG | DIASTOLIC BLOOD PRESSURE: 73 MMHG | HEART RATE: 71 BPM

## 2017-12-11 VITALS — SYSTOLIC BLOOD PRESSURE: 149 MMHG | DIASTOLIC BLOOD PRESSURE: 72 MMHG | RESPIRATION RATE: 20 BRPM

## 2017-12-11 VITALS — HEART RATE: 71 BPM

## 2017-12-11 VITALS — HEART RATE: 70 BPM | SYSTOLIC BLOOD PRESSURE: 140 MMHG | RESPIRATION RATE: 19 BRPM | DIASTOLIC BLOOD PRESSURE: 76 MMHG

## 2017-12-11 VITALS — RESPIRATION RATE: 20 BRPM | SYSTOLIC BLOOD PRESSURE: 134 MMHG | DIASTOLIC BLOOD PRESSURE: 64 MMHG

## 2017-12-11 VITALS — HEART RATE: 72 BPM

## 2017-12-11 VITALS — HEART RATE: 69 BPM

## 2017-12-11 VITALS — HEART RATE: 66 BPM

## 2017-12-11 LAB
ANION GAP SERPL CALC-SCNC: 12 MMOL/L (ref 8–16)
BASOPHILS # BLD AUTO: 0.1 10^3/UL (ref 0–0.1)
BASOPHILS NFR BLD: 0.6 % (ref 0–2)
BUN SERPL-MCNC: 33 MG/DL (ref 7–20)
CALCIUM SERPL-MCNC: 8.7 MG/DL (ref 8.4–10.2)
CHLORIDE SERPL-SCNC: 107 MMOL/L (ref 97–110)
CO2 SERPL-SCNC: 24 MMOL/L (ref 21–31)
CREAT SERPL-MCNC: 3.29 MG/DL (ref 0.61–1.24)
EOSINOPHIL # BLD: 0.5 10^3/UL (ref 0–0.5)
EOSINOPHIL NFR BLD: 6.5 % (ref 0–7)
ERYTHROCYTE [DISTWIDTH] IN BLOOD BY AUTOMATED COUNT: 13.2 % (ref 11.5–14.5)
GLUCOSE SERPL-MCNC: 103 MG/DL (ref 70–220)
HCT VFR BLD CALC: 25.5 % (ref 42–52)
HGB BLD-MCNC: 8.9 G/DL (ref 14–18)
LYMPHOCYTES # BLD AUTO: 2.4 10^3/UL (ref 0.8–2.9)
LYMPHOCYTES NFR BLD AUTO: 30.9 % (ref 15–51)
MCH RBC QN AUTO: 30.7 PG (ref 29–33)
MCHC RBC AUTO-ENTMCNC: 34.9 G/DL (ref 32–37)
MCV RBC AUTO: 87.9 FL (ref 82–101)
MONOCYTES # BLD: 0.7 10^3/UL (ref 0.3–0.9)
MONOCYTES NFR BLD: 8.6 % (ref 0–11)
NEUTROPHILS # BLD: 4.2 10^3/UL (ref 1.6–7.5)
NEUTROPHILS NFR BLD AUTO: 53.1 % (ref 39–77)
NRBC # BLD MANUAL: 0 10^3/UL (ref 0–0)
NRBC BLD AUTO-RTO: 0 /100WBC (ref 0–0)
PLATELET # BLD: 159 10^3/UL (ref 140–415)
PMV BLD AUTO: 11.8 FL (ref 7.4–10.4)
POTASSIUM SERPL-SCNC: 4.4 MMOL/L (ref 3.5–5.1)
RBC # BLD AUTO: 2.9 10^6/UL (ref 4.7–6.1)
SODIUM SERPL-SCNC: 139 MMOL/L (ref 135–144)
WBC # BLD AUTO: 7.9 10^3/UL (ref 4.8–10.8)

## 2017-12-11 RX ADMIN — HEPARIN SODIUM SCH UNIT: 5000 INJECTION, SOLUTION INTRAVENOUS; SUBCUTANEOUS at 06:16

## 2017-12-11 RX ADMIN — OMEGA-3 FATTY ACIDS CAP DELAYED RELEASE 1000 MG SCH MG: 1000 CAPSULE DELAYED RELEASE at 21:40

## 2017-12-11 RX ADMIN — TAMSULOSIN HYDROCHLORIDE SCH MG: 0.4 CAPSULE ORAL at 09:43

## 2017-12-11 RX ADMIN — SILVER SULFADIAZINE SCH APPLIC: 10 CREAM TOPICAL at 21:43

## 2017-12-11 RX ADMIN — GABAPENTIN SCH MG: 300 CAPSULE ORAL at 21:41

## 2017-12-11 RX ADMIN — SENNOSIDES PRN TAB: 8.6 TABLET, FILM COATED ORAL at 14:48

## 2017-12-11 RX ADMIN — ATORVASTATIN CALCIUM SCH MG: 40 TABLET, FILM COATED ORAL at 21:41

## 2017-12-11 RX ADMIN — GABAPENTIN SCH MG: 300 CAPSULE ORAL at 09:43

## 2017-12-11 RX ADMIN — SODIUM CITRATE AND CITRIC ACID MONOHYDRATE SCH ML: 500; 334 SOLUTION ORAL at 09:42

## 2017-12-11 RX ADMIN — SILVER SULFADIAZINE SCH APPLIC: 10 CREAM TOPICAL at 08:05

## 2017-12-11 RX ADMIN — LEVOTHYROXINE SODIUM SCH MCG: 75 TABLET ORAL at 06:10

## 2017-12-11 RX ADMIN — DOXYCYCLINE HYCLATE SCH MG: 100 TABLET, FILM COATED ORAL at 09:43

## 2017-12-11 RX ADMIN — NIFEDIPINE SCH MG: 60 TABLET, FILM COATED, EXTENDED RELEASE ORAL at 09:44

## 2017-12-11 RX ADMIN — DOCUSATE SODIUM PRN MG: 100 CAPSULE, LIQUID FILLED ORAL at 14:48

## 2017-12-11 RX ADMIN — DOXYCYCLINE HYCLATE SCH MG: 100 TABLET, FILM COATED ORAL at 21:44

## 2017-12-11 RX ADMIN — HEPARIN SODIUM SCH UNIT: 5000 INJECTION, SOLUTION INTRAVENOUS; SUBCUTANEOUS at 14:44

## 2017-12-11 RX ADMIN — Medication SCH EACH: at 21:40

## 2017-12-11 RX ADMIN — GABAPENTIN SCH MG: 300 CAPSULE ORAL at 14:43

## 2017-12-11 RX ADMIN — TERAZOSIN HYDROCHLORIDE ANHYDROUS SCH MG: 1 CAPSULE ORAL at 21:42

## 2017-12-11 RX ADMIN — HEPARIN SODIUM SCH UNIT: 5000 INJECTION, SOLUTION INTRAVENOUS; SUBCUTANEOUS at 21:51

## 2017-12-11 RX ADMIN — Medication SCH EACH: at 09:43

## 2017-12-11 RX ADMIN — SODIUM CITRATE AND CITRIC ACID MONOHYDRATE SCH ML: 500; 334 SOLUTION ORAL at 21:41

## 2017-12-11 RX ADMIN — OMEGA-3 FATTY ACIDS CAP DELAYED RELEASE 1000 MG SCH MG: 1000 CAPSULE DELAYED RELEASE at 09:43

## 2017-12-11 NOTE — PN
Date/Time of Note


Date/Time of Note


DATE: 12/11/17 


TIME: 08:57





Assessment/Plan


VTE Prophylaxis


VTE Prophylaxis Intervention:  heparin





Lines/Catheters


IV Catheter Type (from [facility]):  Saline Lock


Urinary Cath still in place:  Yes





Assessment/Plan


Chief Complaint/Hosp Course


Assessment and plan





1.  Acute kidney injury..  Nephrologist following.  Medications to be renally 

dosed.  Monitor for improvement.  Continue with nephrologist 

recommendations.Patient did have renal ultrasound showing moderate enlarged 

prostate with impression on posterior inferior bladder with bladder distention.

  f/u urologist recs. schroeder  was d/c but still with residual. 





2.  Left lower extremity cellulitis.  Continue on antibiotics per ID 

recommendations. improved





3.  BPH.  Continue Flomax and terazosin.  Schroeder catheter in place.  Patient did 

have CT scan of abdomen and pelvis that did show prostate hypertrophy with the 

prostate measuring about 57 mm in transverse dimension exerting mass-effect at 

the posterior inferior urinary bladder.  Follow-up with urologist and patient 

for decision of possible TURP





4.  Normocytic normochromic anemia.  Noted with iron deficiency.  Continue iron 

supplement. stable 





5.  Hypertension.  Continue antihypertensives and adjust needed.stable





6.  Diabetes.  Continue insulin regimen.  A1c noted at 6.3. stable





7.  Hypothyroidism.  Continue Synthroid





8.  Dyslipidemia.  Continue on statin medication





9.  Diabetic neuropathy.  Continue on Neurontin





10. Hyponatremia.  Stable.  Continue with nephrologist recommendations





Disposition and plan: Follow-up for possible TURP.  Monitor renal panel.





Discussed plan of care with Dr. Beck


Problems:  





Subjective


24 Hr Interval Summary


Free Text/Dictation


denies any abdominal pain at this time .comfortable at present





Exam/Review of Systems


Vital Signs


Vitals





 Vital Signs








  Date Time  Temp Pulse Resp B/P Pulse Ox O2 Delivery O2 Flow Rate FiO2


 


12/11/17 08:20      Room Air  


 


12/11/17 08:06  72      


 


12/11/17 07:40 98.1  20 149/72 99   











Exam


Constitutional:  alert, oriented


Psych:  nl mood/affect


Head:  normocephalic


Eyes:  nl conjunctiva


Neck:  non-tender, supple


Respiratory:  clear to auscultation, normal air movement


Cardiovascular:  regular rate and rhythm


Gastrointestinal:  non-tender, soft, unchanged 


: schroeder in place


Musculoskeletal:  No swelling


Neurological:  CNS II-XII intact, nl mental status, nl speech


Skin: less redness LLE, improving





Results


Result Diagram:  


12/11/17 0659                                                                  

              12/11/17 0659





Results 24 hrs





Laboratory Tests








Test


  12/10/17


11:37 12/10/17


17:18 12/10/17


20:33 12/11/17


06:59


 


Bedside Glucose 142   148   150   


 


White Blood Count    7.9  


 


Red Blood Count    2.90  L


 


Hemoglobin    8.9  L


 


Hematocrit    25.5  L


 


Mean Corpuscular Volume    87.9  


 


Mean Corpuscular Hemoglobin    30.7  


 


Mean Corpuscular Hemoglobin


Concent 


  


  


  34.9  


 


 


Red Cell Distribution Width    13.2  


 


Platelet Count    159  


 


Mean Platelet Volume    11.8  H


 


Neutrophils %    53.1  


 


Lymphocytes %    30.9  


 


Monocytes %    8.6  


 


Eosinophils %    6.5  


 


Basophils %    0.6  


 


Nucleated Red Blood Cells %    0.0  


 


Neutrophils #    4.2  


 


Lymphocytes #    2.4  


 


Monocytes #    0.7  


 


Eosinophils #    0.5  


 


Basophils #    0.1  


 


Nucleated Red Blood Cells #    0.0  


 


Sodium Level    139  


 


Potassium Level    4.4  


 


Chloride Level    107  


 


Carbon Dioxide Level    24  


 


Anion Gap    12  


 


Blood Urea Nitrogen    33  H


 


Creatinine    3.29  H


 


Glucose Level    103  


 


Calcium Level    8.7  














Test


  12/11/17


07:40 


  


  


 


 


Bedside Glucose 91     











Medications


Medications





 Current Medications


Ondansetron HCl (Zofran Tab) 4 mg Q6H  PRN PO NAUSEA AND/OR VOMITING;  Start 12/ 1/17 at 23:00


Acetaminophen (Tylenol Tab) 650 mg Q6H  PRN PO PAIN LEVEL 1-3 OR FEVER;  Start 

12/1/17 at 23:00


Acetaminophen/ Hydrocodone Bitart (Norco (5/325)) 1 tab Q6H  PRN PO PAIN LEVEL 4

-6;  Start 12/1/17 at 23:00


Heparin Sodium (Porcine) (Heparin  (5000 Units/0.5 ml)) 5,000 unit Q8 SC  Last 

administered on 12/11/17at 06:16; Admin Dose 5,000 UNIT;  Start 12/1/17 at 23:00


Atorvastatin Calcium (Lipitor) 40 mg QHS PO  Last administered on 12/10/17at 20:

44; Admin Dose 40 MG;  Start 12/1/17 at 23:00


Carvedilol (Coreg) 12.5 mg BID PO  Last administered on 12/10/17at 20:44; Admin 

Dose 12.5 MG;  Start 12/1/17 at 23:00


Hydralazine HCl (Apresoline) 10 mg Q4H  PRN IV ELEVATED SYSTOLIC BP Last 

administered on 12/2/17at 08:41; Admin Dose 10 MG;  Start 12/1/17 at 23:00


Diagnostic Test (Pha) (Accu-Chek) 1 ea 02 XX  Last administered on 12/3/17at 02:

49; Admin Dose 1 EA;  Start 12/2/17 at 02:00


Miscellaneous Information 1 ea NOTE XX ;  Start 12/1/17 at 23:00


Glucose (Glutose) 15 gm Q15M  PRN PO DECREASED GLUCOSE;  Start 12/1/17 at 23:00


Glucose (Glutose) 22.5 gm Q15M  PRN PO DECREASED GLUCOSE;  Start 12/1/17 at 23:

00


Dextrose (D50w Syringe) 25 ml Q15M  PRN IV DECREASED GLUCOSE;  Start 12/1/17 at 

23:00


Dextrose (D50w Syringe) 50 ml Q15M  PRN IV DECREASED GLUCOSE;  Start 12/1/17 at 

23:00


Glucagon (Glucagen) 1 mg Q15M  PRN IM DECREASED GLUCOSE;  Start 12/1/17 at 23:00


Glucose (Glutose) 15 gm Q15M  PRN BUCCAL DECREASED GLUCOSE;  Start 12/1/17 at 23

:00


Fish Oil (Fish Oil) 1,000 mg BID PO  Last administered on 12/10/17at 20:43; 

Admin Dose 1,000 MG;  Start 12/2/17 at 09:00


Lactobacillus Acidophilus (Florajen3 Capsule) 1 each BID PO  Last administered 

on 12/10/17at 20:51; Admin Dose 1 EACH;  Start 12/2/17 at 09:00


Tamsulosin HCl (Flomax) 0.4 mg DAILY PO  Last administered on 12/10/17at 08:17; 

Admin Dose 0.4 MG;  Start 12/3/17 at 09:00


Terazosin HCl (Hytrin) 1 mg HS PO  Last administered on 12/10/17at 21:00; Admin 

Dose 1 MG;  Start 12/2/17 at 21:00


Nifedipine (Procardia Xl) 30 mg DAILY PO  Last administered on 12/10/17at 08:18

; Admin Dose 30 MG;  Start 12/4/17 at 09:00


Silver Sulfadiazine (Thermazene 1% 25 Gm) 1 applic BID TOP  Last administered 

on 12/10/17at 20:58; Admin Dose 1 APPLIC;  Start 12/5/17 at 21:00


Docusate Sodium (Colace) 100 mg BID  PRN PO Constipation Last administered on 12 /5/17at 21:08; Admin Dose 100 MG;  Start 12/5/17 at 18:00


Senna (Senokot) 1 tab BID  PRN PO Constipation Last administered on 12/5/17at 21

:08; Admin Dose 1 TAB;  Start 12/5/17 at 18:00


Doxycycline Hyclate (Vibramycin) 100 mg BID PO  Last administered on 12/10/17at 

20:43; Admin Dose 100 MG;  Start 12/7/17 at 21:00


Citric Acid/ Sodium Citrate (Bicitra) 30 ml BID PO  Last administered on 12/10/

17at 20:45; Admin Dose 30 ML;  Start 12/8/17 at 21:00


Gabapentin (Neurontin) 300 mg TID PO  Last administered on 12/10/17at 20:44; 

Admin Dose 300 MG;  Start 12/8/17 at 21:00











MARGIE WALTON Dec 11, 2017 09:05

## 2017-12-11 NOTE — RADRPT
PROCEDURE:   CT Abdomen and Pelvis without contrast. 

 

CLINICAL INDICATION:  Bilateral hydronephrosis with enlarged prostate.

 

TECHNIQUE:   CT scan of the abdomen and pelvis without contrast was performed on a multidetector hig
h-resolution CT scanner. The patient was scanned without intravenous contrast.  Coronal and sagittal
 reformatted images were obtained from the axial source images. Images were reviewed on a high-resol
[employer] PACS workstation. The total exam CTDI equals 11.14 mGy and the total exam DLP equals 661.86 mG
y-cm.

 

DICOM images are available.

One or more of the following dose reduction techniques were utilized:

1.) Automated exposure control

2.) Adjustment of the mA +/- kV according to patient's size

3.) Use of iterative reconstruction technique.

 

COMPARISON:   None. 

 

FINDINGS:

 

ABNORMAL RESULTS:  SMALL AMOUNT OF PERICARDIAL FLUID AND CT FINDINGS OF ACUTE CHOLECYSTITIS.

 

CT abdomen:

 

Small bilateral pleural effusions with likely degree of loculation. Lung bases are otherwise clear. 
The heart size is normal, with small amount of pericardial fluid, otherwise nonspecific.

 

The liver is normal in size and density without focal mass or intrahepatic biliary dilatation.  The 
spleen is normal in size and homogeneous in density.  The stomach is partially collapsed, but is jenny
ssly unremarkable.  The pancreas as visualized is normal.  The gallbladder demonstrates pericholecys
tic fluid versus wall thickening versus pericholecystic fat inflammatory changes. Recommend ultrasou
nd examination for further evaluation to exclude acute cholecystitis. There are no visualized gallst
ones on this series. The biliary tree is unremarkable and there is no evidence for biliary dilatatio
n.  The adrenal glands are symmetric and normal.  Mild right hydronephrosis and hydroureter is seen 
with mild fat inflammatory changes over the lower right ureter in the pelvis. There is no obstructin
g calculus. The right kidney is unremarkable, without evident hydronephrosis or hydroureter. There i
s no evident renal mass.

 

The aorta is of normal caliber.  Aortic vascular calcifications are present.  There is no retroperit
lee lymphadenopathy.  The howard hepatis region is clear. Mild air throughout the nondilated colon 
small bowel suggesting nonspecific mild small bowel motility disorder. Scattered diverticula in the 
colon. Otherwise, the bowel and mesentery, as visualized, are equally unremarkable. The appendix is 
unremarkable.

 

ABNORMAL RESULTS:  SMALL AMOUNT OF PERICARDIAL FLUID AND CT FINDINGS OF ACUTE CHOLECYSTITIS.

 

CT pelvis:

 

The small bowel loops situated within the pelvis are unremarkable.  The pelvic organs are normal.  T
he pelvic sidewalls and inguinal regions are clear.  The sigmoid colon and rectum are remarkable for
 sigmoid diverticulosis.  No mass, lymphadenopathy, or free fluid is seen.  No acute inflammation is
 seen.  

 

The surrounding osseous structures are remarkable for degenerative spondylosis of the spine.  No ost
eolytic or osteoblastic lesion is detected.  

 

IMPRESSION:

1. Mild left hydroureter and hydronephrosis, without identified obstructing calculus.

2. Right kidney is unremarkable, without evident hydroureter or hydronephrosis.

3. Gallbladder wall thickening versus pericholecystic fluid versus pericholecystic fat inflammatory 
changes, without gallstones.

4. Findings are suspicious for acute cholecystitis and recommend ultrasound correlation.

5. Shaw catheter in place, with mural thickening throughout the urinary bladder compatible with uri
nary outlet obstruction.

6. Prostatic hypertrophy with a prostate measuring up to about 57 mm in transverse dimension, exerti
ng mass effect at the posterior inferior urinary bladder.

7. Small amount of pericardial fluid is nonspecific, and otherwise, the heart is not enlarged.

8. Small bilateral pleural effusions with possible loculation component.

 

ABNORMAL RESULTS:  SMALL AMOUNT OF PERICARDIAL FLUID AND CT FINDINGS OF ACUTE CHOLECYSTITIS.

 

RPTAT: UU

_____________________________________________ 

Physician Basil           Date    Time 

Electronically viewed and signed by Physician Basil on 12/11/2017 02:38 

 

D:  12/11/2017 02:38  T:  12/11/2017 02:38

RS/

## 2017-12-11 NOTE — CONS
Date/Time of Note


Date/Time of Note


DATE: 12/11/17 


TIME: 14:43





Assessment/Plan


Assessment/Plan


Chief Complaint/Hosp Course


SUBJECTIVE:  No acute changes. Alert, denies pain, looks comfortable.  No 

fevers.  


  


ANTIMICROBIALS:  Doxycycline.


 


PHYSICAL EXAMINATION:


GENERAL:  Well-developed, middle-aged  man who is  in no distress.


HEENT:  Head atraumatic, normocephalic.  Sclerae anicteric.  Buccal mucosa pink.


NECK:  Supple.


CHEST:  Rise symmetrical.  Breath sounds clear.


HEART:  S1, S2.


ABDOMEN:  Soft.  Bowel tones present.


EXTREMITIES:  Left lower extremity, resolving erythema.


 


ASSESSMENT:


1.  Left lower extremity cellulitis, improving


2.  Acute kidney injury, possibly on chronic kidney disease.


3.  BPH.


4.  Diabetes.


5.  Hypertension.


 


PLAN:  Patient remains stable. Continue abx, topical [place], f/u renal rec-

s.





DW staff


Problems:  





Consultation Date/Type/Reason


Admit Date/Time


Dec 1, 2017 at 22:22


Initial Consult Date


12/2/17


Type of Consultation:  id


Referring Provider:  MARCOS BOYLE





Exam/Review of Systems


Vital Signs


Vitals





 Vital Signs








  Date Time  Temp Pulse Resp B/P Pulse Ox O2 Delivery O2 Flow Rate FiO2


 


12/11/17 12:02  69      


 


12/11/17 11:57 98.1  20 143/75 99   


 


12/11/17 11:19      Room Air  











Results


Result Diagram:  


12/11/17 0659                                                                  

              12/11/17 0659





Results 24 hrs





Laboratory Tests








Test


  12/10/17


17:18 12/10/17


20:33 12/11/17


06:59 12/11/17


07:40


 


Bedside Glucose 148   150    91  


 


White Blood Count   7.9   


 


Red Blood Count   2.90  L 


 


Hemoglobin   8.9  L 


 


Hematocrit   25.5  L 


 


Mean Corpuscular Volume   87.9   


 


Mean Corpuscular Hemoglobin   30.7   


 


Mean Corpuscular Hemoglobin


Concent 


  


  34.9  


  


 


 


Red Cell Distribution Width   13.2   


 


Platelet Count   159   


 


Mean Platelet Volume   11.8  H 


 


Neutrophils %   53.1   


 


Lymphocytes %   30.9   


 


Monocytes %   8.6   


 


Eosinophils %   6.5   


 


Basophils %   0.6   


 


Nucleated Red Blood Cells %   0.0   


 


Neutrophils #   4.2   


 


Lymphocytes #   2.4   


 


Monocytes #   0.7   


 


Eosinophils #   0.5   


 


Basophils #   0.1   


 


Nucleated Red Blood Cells #   0.0   


 


Sodium Level   139   


 


Potassium Level   4.4   


 


Chloride Level   107   


 


Carbon Dioxide Level   24   


 


Anion Gap   12   


 


Blood Urea Nitrogen   33  H 


 


Creatinine   3.29  H 


 


Glucose Level   103   


 


Calcium Level   8.7   














Test


  12/11/17


11:54 


  


  


 


 


Bedside Glucose 124     











Medications


Medications





 Current Medications


Ondansetron HCl (Zofran Tab) 4 mg Q6H  PRN PO NAUSEA AND/OR VOMITING;  Start 12/ 1/17 at 23:00


Acetaminophen (Tylenol Tab) 650 mg Q6H  PRN PO PAIN LEVEL 1-3 OR FEVER;  Start 

12/1/17 at 23:00


Acetaminophen/ Hydrocodone Bitart (Norco (5/325)) 1 tab Q6H  PRN PO PAIN LEVEL 4

-6;  Start 12/1/17 at 23:00


Heparin Sodium (Porcine) (Heparin  (5000 Units/0.5 ml)) 5,000 unit Q8 SC  Last 

administered on 12/11/17at 06:16; Admin Dose 5,000 UNIT;  Start 12/1/17 at 23:00


Atorvastatin Calcium (Lipitor) 40 mg QHS PO  Last administered on 12/10/17at 20:

44; Admin Dose 40 MG;  Start 12/1/17 at 23:00


Carvedilol (Coreg) 12.5 mg BID PO  Last administered on 12/11/17at 09:43; Admin 

Dose 12.5 MG;  Start 12/1/17 at 23:00


Hydralazine HCl (Apresoline) 10 mg Q4H  PRN IV ELEVATED SYSTOLIC BP Last 

administered on 12/2/17at 08:41; Admin Dose 10 MG;  Start 12/1/17 at 23:00


Diagnostic Test (Pha) (Accu-Chek) 1 ea 02 XX  Last administered on 12/3/17at 02:

49; Admin Dose 1 EA;  Start 12/2/17 at 02:00


Miscellaneous Information 1 ea NOTE XX ;  Start 12/1/17 at 23:00


Glucose (Glutose) 15 gm Q15M  PRN PO DECREASED GLUCOSE;  Start 12/1/17 at 23:00


Glucose (Glutose) 22.5 gm Q15M  PRN PO DECREASED GLUCOSE;  Start 12/1/17 at 23:

00


Dextrose (D50w Syringe) 25 ml Q15M  PRN IV DECREASED GLUCOSE;  Start 12/1/17 at 

23:00


Dextrose (D50w Syringe) 50 ml Q15M  PRN IV DECREASED GLUCOSE;  Start 12/1/17 at 

23:00


Glucagon (Glucagen) 1 mg Q15M  PRN IM DECREASED GLUCOSE;  Start 12/1/17 at 23:00


Glucose (Glutose) 15 gm Q15M  PRN BUCCAL DECREASED GLUCOSE;  Start 12/1/17 at 23

:00


Fish Oil (Fish Oil) 1,000 mg BID PO  Last administered on 12/11/17at 09:43; 

Admin Dose 1,000 MG;  Start 12/2/17 at 09:00


Lactobacillus Acidophilus (Florajen3 Capsule) 1 each BID PO  Last administered 

on 12/11/17at 09:43; Admin Dose 1 EACH;  Start 12/2/17 at 09:00


Tamsulosin HCl (Flomax) 0.4 mg DAILY PO  Last administered on 12/11/17at 09:43; 

Admin Dose 0.4 MG;  Start 12/3/17 at 09:00


Terazosin HCl (Hytrin) 1 mg HS PO  Last administered on 12/10/17at 21:00; Admin 

Dose 1 MG;  Start 12/2/17 at 21:00


Nifedipine (Procardia Xl) 30 mg DAILY PO  Last administered on 12/11/17at 09:44

; Admin Dose 30 MG;  Start 12/4/17 at 09:00


Silver Sulfadiazine (Thermazene 1% 25 Gm) 1 applic BID TOP  Last administered 

on 12/10/17at 20:58; Admin Dose 1 APPLIC;  Start 12/5/17 at 21:00


Docusate Sodium (Colace) 100 mg BID  PRN PO Constipation Last administered on 12 /5/17at 21:08; Admin Dose 100 MG;  Start 12/5/17 at 18:00


Senna (Senokot) 1 tab BID  PRN PO Constipation Last administered on 12/5/17at 21

:08; Admin Dose 1 TAB;  Start 12/5/17 at 18:00


Doxycycline Hyclate (Vibramycin) 100 mg BID PO  Last administered on 12/11/17at 

09:43; Admin Dose 100 MG;  Start 12/7/17 at 21:00


Citric Acid/ Sodium Citrate (Bicitra) 30 ml BID PO  Last administered on 12/11/ 17at 09:42; Admin Dose 30 ML;  Start 12/8/17 at 21:00


Gabapentin (Neurontin) 300 mg TID PO  Last administered on 12/11/17at 09:43; 

Admin Dose 300 MG;  Start 12/8/17 at 21:00











YASMIN BOURNE NP Dec 11, 2017 14:43

## 2017-12-11 NOTE — CONS
Date/Time of Note


Date/Time of Note


DATE: 12/11/17 


TIME: 18:44





Consult Date/Type/Reason


Admit Date/Time


Dec 1, 2017 at 22:22


Initial Consult Date


12/5/17


Type of Consultation:  Urology


Reason for Consultation


Urinary retention


Ordering Provider:  MARCOS BOYLE





Subjective


Patient states that he is comfortable and has no pain.





Objective





 Vital Signs








  Date Time  Temp Pulse Resp B/P Pulse Ox O2 Delivery O2 Flow Rate FiO2


 


12/11/17 16:05  71      


 


12/11/17 16:02 98.0  20 125/73 99   


 


12/11/17 11:19      Room Air  








Exam


The abdomen is soft the Shaw catheter is draining clear urine.  CT scan of the 

abdomen and pelvis done today showed:


1. Mild left hydroureter and hydronephrosis, without identified obstructing 

calculus.


2. Right kidney is unremarkable, without evident hydroureter or hydronephrosis.


3. Gallbladder wall thickening versus pericholecystic fluid versus 

pericholecystic fat inflammatory changes, without gallstones.


4. Findings are suspicious for acute cholecystitis and recommend ultrasound 

correlation.


5. Shaw catheter in place, with mural thickening throughout the urinary 

bladder compatible with urinary outlet obstruction.


6. Prostatic hypertrophy with a prostate measuring up to about 57 mm in 

transverse dimension, exerting mass effect at the posterior inferior urinary 

bladder.


7. Small amount of pericardial fluid is nonspecific, and otherwise, the heart 

is not enlarged.


8. Small bilateral pleural effusions with possible loculation component.





Results/Medications


Result Diagram:  


12/11/17 0659                                                                  

              12/11/17 0659





Results 24 hrs





Laboratory Tests








Test


  12/10/17


20:33 12/11/17


06:59 12/11/17


07:40 12/11/17


11:54


 


Bedside Glucose 150    91   124  


 


White Blood Count  7.9    


 


Red Blood Count  2.90  L  


 


Hemoglobin  8.9  L  


 


Hematocrit  25.5  L  


 


Mean Corpuscular Volume  87.9    


 


Mean Corpuscular Hemoglobin  30.7    


 


Mean Corpuscular Hemoglobin


Concent 


  34.9  


  


  


 


 


Red Cell Distribution Width  13.2    


 


Platelet Count  159    


 


Mean Platelet Volume  11.8  H  


 


Neutrophils %  53.1    


 


Lymphocytes %  30.9    


 


Monocytes %  8.6    


 


Eosinophils %  6.5    


 


Basophils %  0.6    


 


Nucleated Red Blood Cells %  0.0    


 


Neutrophils #  4.2    


 


Lymphocytes #  2.4    


 


Monocytes #  0.7    


 


Eosinophils #  0.5    


 


Basophils #  0.1    


 


Nucleated Red Blood Cells #  0.0    


 


Sodium Level  139    


 


Potassium Level  4.4    


 


Chloride Level  107    


 


Carbon Dioxide Level  24    


 


Anion Gap  12    


 


Blood Urea Nitrogen  33  H  


 


Creatinine  3.29  H  


 


Glucose Level  103    


 


Calcium Level  8.7    














Test


  12/11/17


17:37 


  


  


 


 


Bedside Glucose 119     








Medications





 Current Medications


Ondansetron HCl (Zofran Tab) 4 mg Q6H  PRN PO NAUSEA AND/OR VOMITING;  Start 12/ 1/17 at 23:00


Acetaminophen (Tylenol Tab) 650 mg Q6H  PRN PO PAIN LEVEL 1-3 OR FEVER;  Start 

12/1/17 at 23:00


Acetaminophen/ Hydrocodone Bitart (Norco (5/325)) 1 tab Q6H  PRN PO PAIN LEVEL 4

-6;  Start 12/1/17 at 23:00


Heparin Sodium (Porcine) (Heparin  (5000 Units/0.5 ml)) 5,000 unit Q8 SC  Last 

administered on 12/11/17at 14:44; Admin Dose 5,000 UNIT;  Start 12/1/17 at 23:00


Atorvastatin Calcium (Lipitor) 40 mg QHS PO  Last administered on 12/10/17at 20:

44; Admin Dose 40 MG;  Start 12/1/17 at 23:00


Carvedilol (Coreg) 12.5 mg BID PO  Last administered on 12/11/17at 09:43; Admin 

Dose 12.5 MG;  Start 12/1/17 at 23:00


Hydralazine HCl (Apresoline) 10 mg Q4H  PRN IV ELEVATED SYSTOLIC BP Last 

administered on 12/2/17at 08:41; Admin Dose 10 MG;  Start 12/1/17 at 23:00


Diagnostic Test (Pha) (Accu-Chek) 1 ea 02 XX  Last administered on 12/3/17at 02:

49; Admin Dose 1 EA;  Start 12/2/17 at 02:00


Miscellaneous Information 1 ea NOTE XX ;  Start 12/1/17 at 23:00


Glucose (Glutose) 15 gm Q15M  PRN PO DECREASED GLUCOSE;  Start 12/1/17 at 23:00


Glucose (Glutose) 22.5 gm Q15M  PRN PO DECREASED GLUCOSE;  Start 12/1/17 at 23:

00


Dextrose (D50w Syringe) 25 ml Q15M  PRN IV DECREASED GLUCOSE;  Start 12/1/17 at 

23:00


Dextrose (D50w Syringe) 50 ml Q15M  PRN IV DECREASED GLUCOSE;  Start 12/1/17 at 

23:00


Glucagon (Glucagen) 1 mg Q15M  PRN IM DECREASED GLUCOSE;  Start 12/1/17 at 23:00


Glucose (Glutose) 15 gm Q15M  PRN BUCCAL DECREASED GLUCOSE;  Start 12/1/17 at 23

:00


Fish Oil (Fish Oil) 1,000 mg BID PO  Last administered on 12/11/17at 09:43; 

Admin Dose 1,000 MG;  Start 12/2/17 at 09:00


Lactobacillus Acidophilus (Florajen3 Capsule) 1 each BID PO  Last administered 

on 12/11/17at 09:43; Admin Dose 1 EACH;  Start 12/2/17 at 09:00


Tamsulosin HCl (Flomax) 0.4 mg DAILY PO  Last administered on 12/11/17at 09:43; 

Admin Dose 0.4 MG;  Start 12/3/17 at 09:00


Terazosin HCl (Hytrin) 1 mg HS PO  Last administered on 12/10/17at 21:00; Admin 

Dose 1 MG;  Start 12/2/17 at 21:00


Nifedipine (Procardia Xl) 30 mg DAILY PO  Last administered on 12/11/17at 09:44

; Admin Dose 30 MG;  Start 12/4/17 at 09:00


Silver Sulfadiazine (Thermazene 1% 25 Gm) 1 applic BID TOP  Last administered 

on 12/10/17at 20:58; Admin Dose 1 APPLIC;  Start 12/5/17 at 21:00


Docusate Sodium (Colace) 100 mg BID  PRN PO Constipation Last administered on 12 /11/17at 14:48; Admin Dose 100 MG;  Start 12/5/17 at 18:00


Senna (Senokot) 1 tab BID  PRN PO Constipation Last administered on 12/11/17at 

14:48; Admin Dose 1 TAB;  Start 12/5/17 at 18:00


Doxycycline Hyclate (Vibramycin) 100 mg BID PO  Last administered on 12/11/17at 

09:43; Admin Dose 100 MG;  Start 12/7/17 at 21:00


Citric Acid/ Sodium Citrate (Bicitra) 30 ml BID PO  Last administered on 12/11/ 17at 09:42; Admin Dose 30 ML;  Start 12/8/17 at 21:00


Gabapentin (Neurontin) 300 mg TID PO  Last administered on 12/11/17at 14:43; 

Admin Dose 300 MG;  Start 12/8/17 at 21:00





Assessment/Plan


Chief Complaint/Hosp Course


57-year-old male has an enlarged prostate urinary retention and acute on 

chronic renal failure. After the ultrasound of the kidney was done it 

demonstrated  the patient to be in urinary retention, a Shaw catheter was put 

in and 500 mL drained out. he had the Shaw catheter for 6 days however his 

renal function did not improve and his creatinine did go up instead of coming 

down.  The Shaw catheter was removed.  He voided small amount but his postvoid 

residual was over 700 mL so a new catheter was put in.  His renal function 

still did not improve with the Shaw catheter in place.  He had a CT scan of 

the abdomen and pelvis today and the Shaw catheter was clamped for 2 hours 

prior to the CT scan to allow the bladder to fill up.  The CT scan showed:


1. Mild left hydroureter and hydronephrosis, without identified obstructing 

calculus.


2. Right kidney is unremarkable, without evident hydroureter or hydronephrosis.


3. Gallbladder wall thickening versus pericholecystic fluid versus 

pericholecystic fat inflammatory changes, without gallstones.


4. Findings are suspicious for acute cholecystitis and recommend ultrasound 

correlation.


5. Shaw catheter in place, with mural thickening throughout the urinary 

bladder compatible with urinary outlet obstruction.


6. Prostatic hypertrophy with a prostate measuring up to about 57 mm in 

transverse dimension, exerting mass effect at the posterior inferior urinary 

bladder.


7. Small amount of pericardial fluid is nonspecific, and otherwise, the heart 

is not enlarged.


8. Small bilateral pleural effusions with possible loculation component.


Since the patient has a large prostate and urinary retention he will need to 

undergo a cystoscopy and transurethral resection of the prostate.  His 

insurance is capitated to [facility].  Therefore I would recommend to 

keep the Shaw catheter and and discharge him with the Shaw catheter and the 

leg bag and then he should follow-up with the medical group at [facility] for his prostate care


Problems:  











PALOMA COOK MD Dec 11, 2017 18:51

## 2017-12-11 NOTE — CONS
Date/Time of Note


Date/Time of Note


DATE: 12/11/17 


TIME: 17:14





Assessment/Plan


Assessment/Plan


Additional Assessment/Plan


1. Non Oliguric Acute kidney injury vs acute kidney injury on CKD 


2. BPH with urinary retention s/p Shaw catheter placement 


4. H/o possible CKD due to DM nephropathy, pt is not aware about it or has not 

seen any nephrologist as outpatient, I doubt it 


5. DM II


6. HTN


7. Anemia combinatino of iron deficiency anemia and anemia of chronic renal 

disease 


8. LE significant edmea, US negative for DVT, ECHO showed EF 60% with stage I 

diastoilc dysfunction 


9. Accelerated HTN





Plan:


BUN/Cr still remains elevated, , electrolyte stable today 


monitor Renal function and electrolyes 


Bp stable with  Procardia Xl to 30mg po daily and use IV hydralazine prn SBP> 

150 mm Hg 


we will continue to follow





Consultation Date/Type/Reason


Admit Date/Time


Dec 1, 2017 at 22:22


Initial Consult Date


12/2/17


Type of Consultation:  NEPHROLOGY


Referring Provider:  MARCOS BOYLE





24 HR Interval Summary


Free Text/Dictation


Cr still high 3.29- near baseline





Exam/Review of Systems


Vital Signs


Vitals





 Vital Signs








  Date Time  Temp Pulse Resp B/P Pulse Ox O2 Delivery O2 Flow Rate FiO2


 


12/11/17 16:05  71      


 


12/11/17 16:02 98.0  20 125/73 99   


 


12/11/17 11:19      Room Air  











Results


Result Diagram:  


12/11/17 0659                                                                  

              12/11/17 0659





Results 24 hrs





Laboratory Tests








Test


  12/10/17


17:18 12/10/17


20:33 12/11/17


06:59 12/11/17


07:40


 


Bedside Glucose 148   150    91  


 


White Blood Count   7.9   


 


Red Blood Count   2.90  L 


 


Hemoglobin   8.9  L 


 


Hematocrit   25.5  L 


 


Mean Corpuscular Volume   87.9   


 


Mean Corpuscular Hemoglobin   30.7   


 


Mean Corpuscular Hemoglobin


Concent 


  


  34.9  


  


 


 


Red Cell Distribution Width   13.2   


 


Platelet Count   159   


 


Mean Platelet Volume   11.8  H 


 


Neutrophils %   53.1   


 


Lymphocytes %   30.9   


 


Monocytes %   8.6   


 


Eosinophils %   6.5   


 


Basophils %   0.6   


 


Nucleated Red Blood Cells %   0.0   


 


Neutrophils #   4.2   


 


Lymphocytes #   2.4   


 


Monocytes #   0.7   


 


Eosinophils #   0.5   


 


Basophils #   0.1   


 


Nucleated Red Blood Cells #   0.0   


 


Sodium Level   139   


 


Potassium Level   4.4   


 


Chloride Level   107   


 


Carbon Dioxide Level   24   


 


Anion Gap   12   


 


Blood Urea Nitrogen   33  H 


 


Creatinine   3.29  H 


 


Glucose Level   103   


 


Calcium Level   8.7   














Test


  12/11/17


11:54 


  


  


 


 


Bedside Glucose 124     











Medications


Medications





 Current Medications


Ondansetron HCl (Zofran Tab) 4 mg Q6H  PRN PO NAUSEA AND/OR VOMITING;  Start 12/ 1/17 at 23:00


Acetaminophen (Tylenol Tab) 650 mg Q6H  PRN PO PAIN LEVEL 1-3 OR FEVER;  Start 

12/1/17 at 23:00


Acetaminophen/ Hydrocodone Bitart (Norco (5/325)) 1 tab Q6H  PRN PO PAIN LEVEL 4

-6;  Start 12/1/17 at 23:00


Heparin Sodium (Porcine) (Heparin  (5000 Units/0.5 ml)) 5,000 unit Q8 SC  Last 

administered on 12/11/17at 14:44; Admin Dose 5,000 UNIT;  Start 12/1/17 at 23:00


Atorvastatin Calcium (Lipitor) 40 mg QHS PO  Last administered on 12/10/17at 20:

44; Admin Dose 40 MG;  Start 12/1/17 at 23:00


Carvedilol (Coreg) 12.5 mg BID PO  Last administered on 12/11/17at 09:43; Admin 

Dose 12.5 MG;  Start 12/1/17 at 23:00


Hydralazine HCl (Apresoline) 10 mg Q4H  PRN IV ELEVATED SYSTOLIC BP Last 

administered on 12/2/17at 08:41; Admin Dose 10 MG;  Start 12/1/17 at 23:00


Diagnostic Test (Pha) (Accu-Chek) 1 ea 02 XX  Last administered on 12/3/17at 02:

49; Admin Dose 1 EA;  Start 12/2/17 at 02:00


Miscellaneous Information 1 ea NOTE XX ;  Start 12/1/17 at 23:00


Glucose (Glutose) 15 gm Q15M  PRN PO DECREASED GLUCOSE;  Start 12/1/17 at 23:00


Glucose (Glutose) 22.5 gm Q15M  PRN PO DECREASED GLUCOSE;  Start 12/1/17 at 23:

00


Dextrose (D50w Syringe) 25 ml Q15M  PRN IV DECREASED GLUCOSE;  Start 12/1/17 at 

23:00


Dextrose (D50w Syringe) 50 ml Q15M  PRN IV DECREASED GLUCOSE;  Start 12/1/17 at 

23:00


Glucagon (Glucagen) 1 mg Q15M  PRN IM DECREASED GLUCOSE;  Start 12/1/17 at 23:00


Glucose (Glutose) 15 gm Q15M  PRN BUCCAL DECREASED GLUCOSE;  Start 12/1/17 at 23

:00


Fish Oil (Fish Oil) 1,000 mg BID PO  Last administered on 12/11/17at 09:43; 

Admin Dose 1,000 MG;  Start 12/2/17 at 09:00


Lactobacillus Acidophilus (Florajen3 Capsule) 1 each BID PO  Last administered 

on 12/11/17at 09:43; Admin Dose 1 EACH;  Start 12/2/17 at 09:00


Tamsulosin HCl (Flomax) 0.4 mg DAILY PO  Last administered on 12/11/17at 09:43; 

Admin Dose 0.4 MG;  Start 12/3/17 at 09:00


Terazosin HCl (Hytrin) 1 mg HS PO  Last administered on 12/10/17at 21:00; Admin 

Dose 1 MG;  Start 12/2/17 at 21:00


Nifedipine (Procardia Xl) 30 mg DAILY PO  Last administered on 12/11/17at 09:44

; Admin Dose 30 MG;  Start 12/4/17 at 09:00


Silver Sulfadiazine (Thermazene 1% 25 Gm) 1 applic BID TOP  Last administered 

on 12/10/17at 20:58; Admin Dose 1 APPLIC;  Start 12/5/17 at 21:00


Docusate Sodium (Colace) 100 mg BID  PRN PO Constipation Last administered on 12 /11/17at 14:48; Admin Dose 100 MG;  Start 12/5/17 at 18:00


Senna (Senokot) 1 tab BID  PRN PO Constipation Last administered on 12/11/17at 

14:48; Admin Dose 1 TAB;  Start 12/5/17 at 18:00


Doxycycline Hyclate (Vibramycin) 100 mg BID PO  Last administered on 12/11/17at 

09:43; Admin Dose 100 MG;  Start 12/7/17 at 21:00


Citric Acid/ Sodium Citrate (Bicitra) 30 ml BID PO  Last administered on 12/11/ 17at 09:42; Admin Dose 30 ML;  Start 12/8/17 at 21:00


Gabapentin (Neurontin) 300 mg TID PO  Last administered on 12/11/17at 14:43; 

Admin Dose 300 MG;  Start 12/8/17 at 21:00











SANTIAGO MADISON MD Dec 11, 2017 17:14

## 2017-12-12 VITALS — SYSTOLIC BLOOD PRESSURE: 159 MMHG | DIASTOLIC BLOOD PRESSURE: 77 MMHG | RESPIRATION RATE: 20 BRPM

## 2017-12-12 VITALS — SYSTOLIC BLOOD PRESSURE: 163 MMHG | DIASTOLIC BLOOD PRESSURE: 80 MMHG | RESPIRATION RATE: 18 BRPM

## 2017-12-12 VITALS — RESPIRATION RATE: 21 BRPM | SYSTOLIC BLOOD PRESSURE: 165 MMHG | DIASTOLIC BLOOD PRESSURE: 80 MMHG

## 2017-12-12 VITALS — DIASTOLIC BLOOD PRESSURE: 71 MMHG | SYSTOLIC BLOOD PRESSURE: 135 MMHG | RESPIRATION RATE: 20 BRPM

## 2017-12-12 VITALS — HEART RATE: 73 BPM

## 2017-12-12 VITALS — HEART RATE: 70 BPM

## 2017-12-12 VITALS — SYSTOLIC BLOOD PRESSURE: 177 MMHG | DIASTOLIC BLOOD PRESSURE: 86 MMHG | RESPIRATION RATE: 18 BRPM

## 2017-12-12 VITALS — HEART RATE: 72 BPM

## 2017-12-12 VITALS — HEART RATE: 67 BPM

## 2017-12-12 VITALS — HEART RATE: 68 BPM

## 2017-12-12 LAB
ANION GAP SERPL CALC-SCNC: 12 MMOL/L (ref 8–16)
BASOPHILS # BLD AUTO: 0 10^3/UL (ref 0–0.1)
BASOPHILS NFR BLD: 0.5 % (ref 0–2)
BUN SERPL-MCNC: 32 MG/DL (ref 7–20)
CALCIUM SERPL-MCNC: 9.1 MG/DL (ref 8.4–10.2)
CHLORIDE SERPL-SCNC: 106 MMOL/L (ref 97–110)
CO2 SERPL-SCNC: 26 MMOL/L (ref 21–31)
CREAT SERPL-MCNC: 3.23 MG/DL (ref 0.61–1.24)
EOSINOPHIL # BLD: 0.5 10^3/UL (ref 0–0.5)
EOSINOPHIL NFR BLD: 6.7 % (ref 0–7)
ERYTHROCYTE [DISTWIDTH] IN BLOOD BY AUTOMATED COUNT: 12.8 % (ref 11.5–14.5)
GLUCOSE SERPL-MCNC: 132 MG/DL (ref 70–220)
HCT VFR BLD CALC: 27.3 % (ref 42–52)
HGB BLD-MCNC: 9.8 G/DL (ref 14–18)
LYMPHOCYTES # BLD AUTO: 2.7 10^3/UL (ref 0.8–2.9)
LYMPHOCYTES NFR BLD AUTO: 33.3 % (ref 15–51)
MCH RBC QN AUTO: 31.3 PG (ref 29–33)
MCHC RBC AUTO-ENTMCNC: 35.9 G/DL (ref 32–37)
MCV RBC AUTO: 87.2 FL (ref 82–101)
MONOCYTES # BLD: 0.5 10^3/UL (ref 0.3–0.9)
MONOCYTES NFR BLD: 5.9 % (ref 0–11)
NEUTROPHILS # BLD: 4.3 10^3/UL (ref 1.6–7.5)
NEUTROPHILS NFR BLD AUTO: 53.4 % (ref 39–77)
NRBC # BLD MANUAL: 0 10^3/UL (ref 0–0)
NRBC BLD AUTO-RTO: 0 /100WBC (ref 0–0)
PLATELET # BLD: 187 10^3/UL (ref 140–415)
PMV BLD AUTO: 11.9 FL (ref 7.4–10.4)
POTASSIUM SERPL-SCNC: 4.2 MMOL/L (ref 3.5–5.1)
RBC # BLD AUTO: 3.13 10^6/UL (ref 4.7–6.1)
SODIUM SERPL-SCNC: 140 MMOL/L (ref 135–144)
WBC # BLD AUTO: 8 10^3/UL (ref 4.8–10.8)

## 2017-12-12 RX ADMIN — Medication SCH EACH: at 20:19

## 2017-12-12 RX ADMIN — TAMSULOSIN HYDROCHLORIDE SCH MG: 0.4 CAPSULE ORAL at 09:33

## 2017-12-12 RX ADMIN — NIFEDIPINE SCH MG: 30 TABLET, FILM COATED, EXTENDED RELEASE ORAL at 11:35

## 2017-12-12 RX ADMIN — HEPARIN SODIUM SCH UNIT: 5000 INJECTION, SOLUTION INTRAVENOUS; SUBCUTANEOUS at 05:21

## 2017-12-12 RX ADMIN — DOXYCYCLINE HYCLATE SCH MG: 100 TABLET, FILM COATED ORAL at 09:32

## 2017-12-12 RX ADMIN — Medication SCH EACH: at 09:33

## 2017-12-12 RX ADMIN — SILVER SULFADIAZINE SCH APPLIC: 10 CREAM TOPICAL at 09:39

## 2017-12-12 RX ADMIN — GABAPENTIN SCH MG: 300 CAPSULE ORAL at 11:35

## 2017-12-12 RX ADMIN — ATORVASTATIN CALCIUM SCH MG: 40 TABLET, FILM COATED ORAL at 20:19

## 2017-12-12 RX ADMIN — SILVER SULFADIAZINE SCH APPLIC: 10 CREAM TOPICAL at 20:21

## 2017-12-12 RX ADMIN — TERAZOSIN HYDROCHLORIDE ANHYDROUS SCH MG: 1 CAPSULE ORAL at 20:20

## 2017-12-12 RX ADMIN — OMEGA-3 FATTY ACIDS CAP DELAYED RELEASE 1000 MG SCH MG: 1000 CAPSULE DELAYED RELEASE at 09:33

## 2017-12-12 RX ADMIN — HEPARIN SODIUM SCH UNIT: 5000 INJECTION, SOLUTION INTRAVENOUS; SUBCUTANEOUS at 15:04

## 2017-12-12 RX ADMIN — GABAPENTIN SCH MG: 300 CAPSULE ORAL at 09:33

## 2017-12-12 RX ADMIN — GABAPENTIN SCH MG: 300 CAPSULE ORAL at 20:19

## 2017-12-12 RX ADMIN — SODIUM CITRATE AND CITRIC ACID MONOHYDRATE SCH ML: 500; 334 SOLUTION ORAL at 20:19

## 2017-12-12 RX ADMIN — HEPARIN SODIUM SCH UNIT: 5000 INJECTION, SOLUTION INTRAVENOUS; SUBCUTANEOUS at 20:25

## 2017-12-12 RX ADMIN — SODIUM CITRATE AND CITRIC ACID MONOHYDRATE SCH ML: 500; 334 SOLUTION ORAL at 09:32

## 2017-12-12 RX ADMIN — OMEGA-3 FATTY ACIDS CAP DELAYED RELEASE 1000 MG SCH MG: 1000 CAPSULE DELAYED RELEASE at 20:19

## 2017-12-12 RX ADMIN — LEVOTHYROXINE SODIUM SCH MCG: 75 TABLET ORAL at 05:20

## 2017-12-12 NOTE — CONS
Date/Time of Note


Date/Time of Note


DATE: 12/12/17 


TIME: 15:58





Assessment/Plan


Assessment/Plan


Additional Assessment/Plan


1. Non Oliguric Acute kidney injury vs acute kidney injury on CKD 


2. BPH with urinary retention s/p Schroeder catheter placement 


4. H/o possible CKD due to DM nephropathy, pt is not aware about it or has not 

seen any nephrologist as outpatient, I doubt it 


5. DM II


6. HTN


7. Anemia combinatino of iron deficiency anemia and anemia of chronic renal 

disease 


8. LE significant edmea, US negative for DVT, ECHO showed EF 60% with stage I 

diastoilc dysfunction 


9. Accelerated HTN





Plan:


BUN/Cr still remains elevated, , electrolyte stable today 


monitor Renal function and electrolyes 


Bp stable with  Procardia Xl to 30mg po daily and use IV hydralazine prn SBP> 

150 mm Hg 


we will continue to follow





Consultation Date/Type/Reason


Admit Date/Time


Dec 1, 2017 at 22:22


Initial Consult Date


12/2/17


Type of Consultation:  NEPHROLOGY


Referring Provider:  MARCOS BOYLE





Exam/Review of Systems


Vital Signs


Vitals





 Vital Signs








  Date Time  Temp Pulse Resp B/P Pulse Ox O2 Delivery O2 Flow Rate FiO2


 


12/12/17 12:18 97.9 73 21 165/80 98   


 


12/11/17 11:19      Room Air  














 Intake and Output   


 


 12/11/17 12/11/17 12/12/17





 15:00 23:00 07:00


 


Intake Total 400 ml  


 


Output Total 1100 ml  


 


Balance -700 ml  











Exam


Constitutional:  alert


Respiratory:  clear to auscultation, diminished breath sounds, normal air 

movement


Cardiovascular:  nl pulses, regular rate and rhythm


Gastrointestinal:  nl liver, spleen, non-tender, soft, + schroeder catheter 


Musculoskeletal:  nl extremities to inspection


Extremities:  normal pulses


Neurological:  CNS II-XII intact, nl mental status, nl speech, nl strength





Results


Result Diagram:  


12/12/17 0619                                                                  

              12/12/17 0619





Results 24 hrs





Laboratory Tests








Test


  12/11/17


17:37 12/11/17


21:46 12/12/17


06:19 12/12/17


07:56


 


Bedside Glucose 119   131    117  


 


White Blood Count   8.0   


 


Red Blood Count   3.13  L 


 


Hemoglobin   9.8  L 


 


Hematocrit   27.3  L 


 


Mean Corpuscular Volume   87.2   


 


Mean Corpuscular Hemoglobin   31.3   


 


Mean Corpuscular Hemoglobin


Concent 


  


  35.9  


  


 


 


Red Cell Distribution Width   12.8   


 


Platelet Count   187   


 


Mean Platelet Volume   11.9  H 


 


Neutrophils %   53.4   


 


Lymphocytes %   33.3   


 


Monocytes %   5.9   


 


Eosinophils %   6.7   


 


Basophils %   0.5   


 


Nucleated Red Blood Cells %   0.0   


 


Neutrophils #   4.3   


 


Lymphocytes #   2.7   


 


Monocytes #   0.5   


 


Eosinophils #   0.5   


 


Basophils #   0.0   


 


Nucleated Red Blood Cells #   0.0   


 


Sodium Level   140   


 


Potassium Level   4.2   


 


Chloride Level   106   


 


Carbon Dioxide Level   26   


 


Anion Gap   12   


 


Blood Urea Nitrogen   32  H 


 


Creatinine   3.23  H 


 


Glucose Level   132   


 


Calcium Level   9.1   














Test


  12/12/17


11:33 


  


  


 


 


Bedside Glucose 190     











Medications


Medications





 Current Medications


Ondansetron HCl (Zofran Tab) 4 mg Q6H  PRN PO NAUSEA AND/OR VOMITING;  Start 12/ 1/17 at 23:00


Acetaminophen (Tylenol Tab) 650 mg Q6H  PRN PO PAIN LEVEL 1-3 OR FEVER;  Start 

12/1/17 at 23:00


Acetaminophen/ Hydrocodone Bitart (Norco (5/325)) 1 tab Q6H  PRN PO PAIN LEVEL 4

-6;  Start 12/1/17 at 23:00


Heparin Sodium (Porcine) (Heparin  (5000 Units/0.5 ml)) 5,000 unit Q8 SC  Last 

administered on 12/12/17at 15:04; Admin Dose 5,000 UNIT;  Start 12/1/17 at 23:00


Atorvastatin Calcium (Lipitor) 40 mg QHS PO  Last administered on 12/11/17at 21:

41; Admin Dose 40 MG;  Start 12/1/17 at 23:00


Carvedilol (Coreg) 12.5 mg BID PO  Last administered on 12/12/17at 09:33; Admin 

Dose 12.5 MG;  Start 12/1/17 at 23:00


Hydralazine HCl (Apresoline) 10 mg Q4H  PRN IV ELEVATED SYSTOLIC BP Last 

administered on 12/2/17at 08:41; Admin Dose 10 MG;  Start 12/1/17 at 23:00


Diagnostic Test (Pha) (Accu-Chek) 1 ea 02 XX  Last administered on 12/3/17at 02:

49; Admin Dose 1 EA;  Start 12/2/17 at 02:00


Miscellaneous Information 1 ea NOTE XX ;  Start 12/1/17 at 23:00


Glucose (Glutose) 15 gm Q15M  PRN PO DECREASED GLUCOSE;  Start 12/1/17 at 23:00


Glucose (Glutose) 22.5 gm Q15M  PRN PO DECREASED GLUCOSE;  Start 12/1/17 at 23:

00


Dextrose (D50w Syringe) 25 ml Q15M  PRN IV DECREASED GLUCOSE;  Start 12/1/17 at 

23:00


Dextrose (D50w Syringe) 50 ml Q15M  PRN IV DECREASED GLUCOSE;  Start 12/1/17 at 

23:00


Glucagon (Glucagen) 1 mg Q15M  PRN IM DECREASED GLUCOSE;  Start 12/1/17 at 23:00


Glucose (Glutose) 15 gm Q15M  PRN BUCCAL DECREASED GLUCOSE;  Start 12/1/17 at 23

:00


Fish Oil (Fish Oil) 1,000 mg BID PO  Last administered on 12/12/17at 09:33; 

Admin Dose 1,000 MG;  Start 12/2/17 at 09:00


Lactobacillus Acidophilus (Florajen3 Capsule) 1 each BID PO  Last administered 

on 12/12/17at 09:33; Admin Dose 1 EACH;  Start 12/2/17 at 09:00


Tamsulosin HCl (Flomax) 0.4 mg DAILY PO  Last administered on 12/12/17at 09:33; 

Admin Dose 0.4 MG;  Start 12/3/17 at 09:00


Terazosin HCl (Hytrin) 1 mg HS PO  Last administered on 12/11/17at 21:42; Admin 

Dose 1 MG;  Start 12/2/17 at 21:00


Silver Sulfadiazine (Thermazene 1% 25 Gm) 1 applic BID TOP  Last administered 

on 12/12/17at 09:39; Admin Dose 1 APPLIC;  Start 12/5/17 at 21:00


Docusate Sodium (Colace) 100 mg BID  PRN PO Constipation Last administered on 12 /11/17at 14:48; Admin Dose 100 MG;  Start 12/5/17 at 18:00


Senna (Senokot) 1 tab BID  PRN PO Constipation Last administered on 12/11/17at 

14:48; Admin Dose 1 TAB;  Start 12/5/17 at 18:00


Citric Acid/ Sodium Citrate (Bicitra) 30 ml BID PO  Last administered on 12/12/ 17at 09:32; Admin Dose 30 ML;  Start 12/8/17 at 21:00


Gabapentin (Neurontin) 300 mg TID PO  Last administered on 12/12/17at 11:35; 

Admin Dose 300 MG;  Start 12/8/17 at 21:00


Nifedipine (Procardia Xl) 30 mg DAILY PO  Last administered on 12/12/17at 11:35

; Admin Dose 30 MG;  Start 12/12/17 at 09:00











SANTIAGO MADISON MD Dec 12, 2017 15:59

## 2017-12-12 NOTE — CONS
Date/Time of Note


Date/Time of Note


DATE: 12/12/17 


TIME: 15:01





Assessment/Plan


Assessment/Plan


Chief Complaint/Hosp Course


SUBJECTIVE:  No acute changes. Alert, denies pain, looks comfortable.  No 

fevers.  


  


ANTIMICROBIALS:  Doxycycline.


 


PHYSICAL EXAMINATION:


GENERAL:  Well-developed, middle-aged  man who is  in no distress.


HEENT:  Head atraumatic, normocephalic.  Sclerae anicteric.  Buccal mucosa pink.


NECK:  Supple.


CHEST:  Rise symmetrical.  Breath sounds clear.


HEART:  S1, S2.


ABDOMEN:  Soft.  Bowel tones present.


EXTREMITIES:  Left lower extremity, resolving erythema.


 


ASSESSMENT:


1.  Left lower extremity cellulitis, improving


2.  Acute kidney injury, possibly on chronic kidney disease.


3.  BPH.


4.  Diabetes.


5.  Hypertension.


 


PLAN:  Patient remains stable. LLE wound is dry, no drainage and looks unchanged

, will dc Doxycycline, continue topical Silverdine, f/u renal rec-s.





DW staff


Problems:  





Consultation Date/Type/Reason


Admit Date/Time


Dec 1, 2017 at 22:22


Initial Consult Date


12/2/17


Type of Consultation:  id


Referring Provider:  MARCOS BOYLE





Exam/Review of Systems


Vital Signs


Vitals





 Vital Signs








  Date Time  Temp Pulse Resp B/P Pulse Ox O2 Delivery O2 Flow Rate FiO2


 


12/12/17 12:18 97.9 73 21 165/80 98   


 


12/11/17 11:19      Room Air  














 Intake and Output   


 


 12/11/17 12/11/17 12/12/17





 14:59 22:59 06:59


 


Intake Total 400 ml  


 


Output Total 1100 ml  


 


Balance -700 ml  











Results


Result Diagram:  


12/12/17 0619                                                                  

              12/12/17 0619





Results 24 hrs





Laboratory Tests








Test


  12/11/17


17:37 12/11/17


21:46 12/12/17


06:19 12/12/17


07:56


 


Bedside Glucose 119   131    117  


 


White Blood Count   8.0   


 


Red Blood Count   3.13  L 


 


Hemoglobin   9.8  L 


 


Hematocrit   27.3  L 


 


Mean Corpuscular Volume   87.2   


 


Mean Corpuscular Hemoglobin   31.3   


 


Mean Corpuscular Hemoglobin


Concent 


  


  35.9  


  


 


 


Red Cell Distribution Width   12.8   


 


Platelet Count   187   


 


Mean Platelet Volume   11.9  H 


 


Neutrophils %   53.4   


 


Lymphocytes %   33.3   


 


Monocytes %   5.9   


 


Eosinophils %   6.7   


 


Basophils %   0.5   


 


Nucleated Red Blood Cells %   0.0   


 


Neutrophils #   4.3   


 


Lymphocytes #   2.7   


 


Monocytes #   0.5   


 


Eosinophils #   0.5   


 


Basophils #   0.0   


 


Nucleated Red Blood Cells #   0.0   


 


Sodium Level   140   


 


Potassium Level   4.2   


 


Chloride Level   106   


 


Carbon Dioxide Level   26   


 


Anion Gap   12   


 


Blood Urea Nitrogen   32  H 


 


Creatinine   3.23  H 


 


Glucose Level   132   


 


Calcium Level   9.1   














Test


  12/12/17


11:33 


  


  


 


 


Bedside Glucose 190     











Medications


Medications





 Current Medications


Ondansetron HCl (Zofran Tab) 4 mg Q6H  PRN PO NAUSEA AND/OR VOMITING;  Start 12/ 1/17 at 23:00


Acetaminophen (Tylenol Tab) 650 mg Q6H  PRN PO PAIN LEVEL 1-3 OR FEVER;  Start 

12/1/17 at 23:00


Acetaminophen/ Hydrocodone Bitart (Norco (5/325)) 1 tab Q6H  PRN PO PAIN LEVEL 4

-6;  Start 12/1/17 at 23:00


Heparin Sodium (Porcine) (Heparin  (5000 Units/0.5 ml)) 5,000 unit Q8 SC  Last 

administered on 12/12/17at 05:21; Admin Dose 5,000 UNIT;  Start 12/1/17 at 23:00


Atorvastatin Calcium (Lipitor) 40 mg QHS PO  Last administered on 12/11/17at 21:

41; Admin Dose 40 MG;  Start 12/1/17 at 23:00


Carvedilol (Coreg) 12.5 mg BID PO  Last administered on 12/12/17at 09:33; Admin 

Dose 12.5 MG;  Start 12/1/17 at 23:00


Hydralazine HCl (Apresoline) 10 mg Q4H  PRN IV ELEVATED SYSTOLIC BP Last 

administered on 12/2/17at 08:41; Admin Dose 10 MG;  Start 12/1/17 at 23:00


Diagnostic Test (Pha) (Accu-Chek) 1 ea 02 XX  Last administered on 12/3/17at 02:

49; Admin Dose 1 EA;  Start 12/2/17 at 02:00


Miscellaneous Information 1 ea NOTE XX ;  Start 12/1/17 at 23:00


Glucose (Glutose) 15 gm Q15M  PRN PO DECREASED GLUCOSE;  Start 12/1/17 at 23:00


Glucose (Glutose) 22.5 gm Q15M  PRN PO DECREASED GLUCOSE;  Start 12/1/17 at 23:

00


Dextrose (D50w Syringe) 25 ml Q15M  PRN IV DECREASED GLUCOSE;  Start 12/1/17 at 

23:00


Dextrose (D50w Syringe) 50 ml Q15M  PRN IV DECREASED GLUCOSE;  Start 12/1/17 at 

23:00


Glucagon (Glucagen) 1 mg Q15M  PRN IM DECREASED GLUCOSE;  Start 12/1/17 at 23:00


Glucose (Glutose) 15 gm Q15M  PRN BUCCAL DECREASED GLUCOSE;  Start 12/1/17 at 23

:00


Fish Oil (Fish Oil) 1,000 mg BID PO  Last administered on 12/12/17at 09:33; 

Admin Dose 1,000 MG;  Start 12/2/17 at 09:00


Lactobacillus Acidophilus (Florajen3 Capsule) 1 each BID PO  Last administered 

on 12/12/17at 09:33; Admin Dose 1 EACH;  Start 12/2/17 at 09:00


Tamsulosin HCl (Flomax) 0.4 mg DAILY PO  Last administered on 12/12/17at 09:33; 

Admin Dose 0.4 MG;  Start 12/3/17 at 09:00


Terazosin HCl (Hytrin) 1 mg HS PO  Last administered on 12/11/17at 21:42; Admin 

Dose 1 MG;  Start 12/2/17 at 21:00


Silver Sulfadiazine (Thermazene 1% 25 Gm) 1 applic BID TOP  Last administered 

on 12/12/17at 09:39; Admin Dose 1 APPLIC;  Start 12/5/17 at 21:00


Docusate Sodium (Colace) 100 mg BID  PRN PO Constipation Last administered on 12 /11/17at 14:48; Admin Dose 100 MG;  Start 12/5/17 at 18:00


Senna (Senokot) 1 tab BID  PRN PO Constipation Last administered on 12/11/17at 

14:48; Admin Dose 1 TAB;  Start 12/5/17 at 18:00


Doxycycline Hyclate (Vibramycin) 100 mg BID PO  Last administered on 12/12/17at 

09:32; Admin Dose 100 MG;  Start 12/7/17 at 21:00


Citric Acid/ Sodium Citrate (Bicitra) 30 ml BID PO  Last administered on 12/12/ 17at 09:32; Admin Dose 30 ML;  Start 12/8/17 at 21:00


Gabapentin (Neurontin) 300 mg TID PO  Last administered on 12/12/17at 11:35; 

Admin Dose 300 MG;  Start 12/8/17 at 21:00


Nifedipine (Procardia Xl) 30 mg DAILY PO  Last administered on 12/12/17at 11:35

; Admin Dose 30 MG;  Start 12/12/17 at 09:00











YASMIN BOURNE NP Dec 12, 2017 15:02

## 2017-12-12 NOTE — PN
Date/Time of Note


Date/Time of Note


DATE: 12/12/17 


TIME: 18:04





Assessment/Plan


VTE Prophylaxis


VTE Prophylaxis Intervention:  heparin





Lines/Catheters


IV Catheter Type (from [facility]):  Saline Lock


Urinary Cath still in place:  Yes


Reason Cath still needed:  urinary retention





Assessment/Plan


Chief Complaint/Hosp Course


1.  Acute nonoliguric kidney injury.  


   -Unknown baseline creatinine.  


   -Possible CKD.


   -Nephrology following.





2.  Left lower extremity cellulitis.  


   -S/P antibiotics.


   -ID following.





3.  Benign prostatic hypertrophy with urinary retention.


   -Continue Flomax and Hytrin.


   -The patient has a Shaw catheter in place.


   -Urology following.





4.  Normocytic, normochromic anemia.


   -Underlying iron deficiency.


   -Continue iron supplements.





5.  Hypertension.  


   -Continue antihypertensives.


   


6.  Diabetes mellitus type 2.


   -Hold metformin.


   -Sliding scale insulin.


   -Hemoglobin A1c 6.3.





7.  Hypothyroidism.  


   -Continue Synthroid.





8.  Dyslipidemia with hypertriglyceridemia.


   -Continue fish oil.


   -Continue statins.





9.  Diabetic neuropathy.


   -Continue gabapentin.





10.  Fluids, electrolytes, and nutrition.


   -Carb controlled, low-cholesterol diet.





11.  DVT prophylaxis.


   -SQ heparin.





12.  Plan.


   -Patient's family wants urology intervention done here.


   -Follow nephrology recommendations for JOSE MANUEL.





Patient was seen in collaboration with Dr. Beck.





Plan of care was explained to the patient.


Problems:  





Subjective


24 Hr Interval Summary


Free Text/Dictation


Denies any pain.





Exam/Review of Systems


Vital Signs


Vitals





 Vital Signs








  Date Time  Temp Pulse Resp B/P Pulse Ox O2 Delivery O2 Flow Rate FiO2


 


12/12/17 16:36 97.9 68 18 177/86 95   


 


12/11/17 11:19      Room Air  














 Intake and Output   


 


 12/11/17 12/11/17 12/12/17





 15:00 23:00 07:00


 


Intake Total 400 ml  


 


Output Total 1100 ml  


 


Balance -700 ml  











Exam


General: Adequately build 57 year-old male lying in bed in no apparent distress.


HEENT: Normocephalic, atraumatic. Eyes: Anicteric sclerae, conjunctivae clear. 

ENT: Nasal septum midline, oral mucosa moist. Neck supple, no JVD noticed.


Respiratory: Bilaterally clear breath sounds. No use of accessory muscles of 

respiration. No adventitious breath sounds.


Cardiovascular: S1, S2 heard.  Regular rate and rhythm.


Abdomen: Soft, nontender, and nondistended. Bowel sounds positive in all 4 

quadrants.


Genitourinary: Deferred.


Extremities: No cyanosis, no clubbing.  Left pedal edema.  Left leg erythema on 

the distal aspect posteriorly with no tenderness to touch.


Neurologic: Cranial nerves II through XII grossly intact. The patient is awake, 

alert, and oriented.





Results


Result Diagram:  


12/12/17 0619 12/12/17 0619





Results 24 hrs





Laboratory Tests








Test


  12/11/17


21:46 12/12/17


06:19 12/12/17


07:56 12/12/17


11:33


 


Bedside Glucose 131    117   190  


 


White Blood Count  8.0    


 


Red Blood Count  3.13  L  


 


Hemoglobin  9.8  L  


 


Hematocrit  27.3  L  


 


Mean Corpuscular Volume  87.2    


 


Mean Corpuscular Hemoglobin  31.3    


 


Mean Corpuscular Hemoglobin


Concent 


  35.9  


  


  


 


 


Red Cell Distribution Width  12.8    


 


Platelet Count  187    


 


Mean Platelet Volume  11.9  H  


 


Neutrophils %  53.4    


 


Lymphocytes %  33.3    


 


Monocytes %  5.9    


 


Eosinophils %  6.7    


 


Basophils %  0.5    


 


Nucleated Red Blood Cells %  0.0    


 


Neutrophils #  4.3    


 


Lymphocytes #  2.7    


 


Monocytes #  0.5    


 


Eosinophils #  0.5    


 


Basophils #  0.0    


 


Nucleated Red Blood Cells #  0.0    


 


Sodium Level  140    


 


Potassium Level  4.2    


 


Chloride Level  106    


 


Carbon Dioxide Level  26    


 


Anion Gap  12    


 


Blood Urea Nitrogen  32  H  


 


Creatinine  3.23  H  


 


Glucose Level  132    


 


Calcium Level  9.1    














Test


  12/12/17


17:01 


  


  


 


 


Bedside Glucose 132     











Medications


Medications





 Current Medications


Ondansetron HCl (Zofran Tab) 4 mg Q6H  PRN PO NAUSEA AND/OR VOMITING;  Start 12/ 1/17 at 23:00


Acetaminophen (Tylenol Tab) 650 mg Q6H  PRN PO PAIN LEVEL 1-3 OR FEVER;  Start 

12/1/17 at 23:00


Acetaminophen/ Hydrocodone Bitart (Norco (5/325)) 1 tab Q6H  PRN PO PAIN LEVEL 4

-6;  Start 12/1/17 at 23:00


Heparin Sodium (Porcine) (Heparin  (5000 Units/0.5 ml)) 5,000 unit Q8 SC  Last 

administered on 12/12/17at 15:04; Admin Dose 5,000 UNIT;  Start 12/1/17 at 23:00


Atorvastatin Calcium (Lipitor) 40 mg QHS PO  Last administered on 12/11/17at 21:

41; Admin Dose 40 MG;  Start 12/1/17 at 23:00


Carvedilol (Coreg) 12.5 mg BID PO  Last administered on 12/12/17at 09:33; Admin 

Dose 12.5 MG;  Start 12/1/17 at 23:00


Hydralazine HCl (Apresoline) 10 mg Q4H  PRN IV ELEVATED SYSTOLIC BP Last 

administered on 12/2/17at 08:41; Admin Dose 10 MG;  Start 12/1/17 at 23:00


Diagnostic Test (Pha) (Accu-Chek) 1 ea 02 XX  Last administered on 12/3/17at 02:

49; Admin Dose 1 EA;  Start 12/2/17 at 02:00


Miscellaneous Information 1 ea NOTE XX ;  Start 12/1/17 at 23:00


Glucose (Glutose) 15 gm Q15M  PRN PO DECREASED GLUCOSE;  Start 12/1/17 at 23:00


Glucose (Glutose) 22.5 gm Q15M  PRN PO DECREASED GLUCOSE;  Start 12/1/17 at 23:

00


Dextrose (D50w Syringe) 25 ml Q15M  PRN IV DECREASED GLUCOSE;  Start 12/1/17 at 

23:00


Dextrose (D50w Syringe) 50 ml Q15M  PRN IV DECREASED GLUCOSE;  Start 12/1/17 at 

23:00


Glucagon (Glucagen) 1 mg Q15M  PRN IM DECREASED GLUCOSE;  Start 12/1/17 at 23:00


Glucose (Glutose) 15 gm Q15M  PRN BUCCAL DECREASED GLUCOSE;  Start 12/1/17 at 23

:00


Fish Oil (Fish Oil) 1,000 mg BID PO  Last administered on 12/12/17at 09:33; 

Admin Dose 1,000 MG;  Start 12/2/17 at 09:00


Lactobacillus Acidophilus (Florajen3 Capsule) 1 each BID PO  Last administered 

on 12/12/17at 09:33; Admin Dose 1 EACH;  Start 12/2/17 at 09:00


Tamsulosin HCl (Flomax) 0.4 mg DAILY PO  Last administered on 12/12/17at 09:33; 

Admin Dose 0.4 MG;  Start 12/3/17 at 09:00


Terazosin HCl (Hytrin) 1 mg HS PO  Last administered on 12/11/17at 21:42; Admin 

Dose 1 MG;  Start 12/2/17 at 21:00


Silver Sulfadiazine (Thermazene 1% 25 Gm) 1 applic BID TOP  Last administered 

on 12/12/17at 09:39; Admin Dose 1 APPLIC;  Start 12/5/17 at 21:00


Docusate Sodium (Colace) 100 mg BID  PRN PO Constipation Last administered on 12 /11/17at 14:48; Admin Dose 100 MG;  Start 12/5/17 at 18:00


Senna (Senokot) 1 tab BID  PRN PO Constipation Last administered on 12/11/17at 

14:48; Admin Dose 1 TAB;  Start 12/5/17 at 18:00


Citric Acid/ Sodium Citrate (Bicitra) 30 ml BID PO  Last administered on 12/12/ 17at 09:32; Admin Dose 30 ML;  Start 12/8/17 at 21:00


Gabapentin (Neurontin) 300 mg TID PO  Last administered on 12/12/17at 11:35; 

Admin Dose 300 MG;  Start 12/8/17 at 21:00


Nifedipine (Procardia Xl) 30 mg DAILY PO  Last administered on 12/12/17at 11:35

; Admin Dose 30 MG;  Start 12/12/17 at 09:00











SELMA DELGADO NP Dec 12, 2017 18:05

## 2017-12-13 VITALS — SYSTOLIC BLOOD PRESSURE: 129 MMHG | RESPIRATION RATE: 20 BRPM | DIASTOLIC BLOOD PRESSURE: 78 MMHG

## 2017-12-13 VITALS — RESPIRATION RATE: 18 BRPM | SYSTOLIC BLOOD PRESSURE: 169 MMHG | DIASTOLIC BLOOD PRESSURE: 86 MMHG

## 2017-12-13 VITALS — HEART RATE: 67 BPM

## 2017-12-13 VITALS — DIASTOLIC BLOOD PRESSURE: 72 MMHG | RESPIRATION RATE: 20 BRPM | SYSTOLIC BLOOD PRESSURE: 154 MMHG

## 2017-12-13 VITALS — DIASTOLIC BLOOD PRESSURE: 77 MMHG | RESPIRATION RATE: 18 BRPM | SYSTOLIC BLOOD PRESSURE: 155 MMHG

## 2017-12-13 VITALS — HEART RATE: 66 BPM

## 2017-12-13 VITALS — HEART RATE: 71 BPM

## 2017-12-13 LAB
ANION GAP SERPL CALC-SCNC: 11 MMOL/L (ref 8–16)
BASOPHILS # BLD AUTO: 0.1 10^3/UL (ref 0–0.1)
BASOPHILS NFR BLD: 0.7 % (ref 0–2)
BUN SERPL-MCNC: 32 MG/DL (ref 7–20)
CALCIUM SERPL-MCNC: 8.7 MG/DL (ref 8.4–10.2)
CHLORIDE SERPL-SCNC: 107 MMOL/L (ref 97–110)
CO2 SERPL-SCNC: 25 MMOL/L (ref 21–31)
CREAT SERPL-MCNC: 3.07 MG/DL (ref 0.61–1.24)
EOSINOPHIL # BLD: 0.5 10^3/UL (ref 0–0.5)
EOSINOPHIL NFR BLD: 6.7 % (ref 0–7)
ERYTHROCYTE [DISTWIDTH] IN BLOOD BY AUTOMATED COUNT: 12.7 % (ref 11.5–14.5)
GLUCOSE SERPL-MCNC: 106 MG/DL (ref 70–220)
HCT VFR BLD CALC: 27.1 % (ref 42–52)
HGB BLD-MCNC: 9.6 G/DL (ref 14–18)
LYMPHOCYTES # BLD AUTO: 2.5 10^3/UL (ref 0.8–2.9)
LYMPHOCYTES NFR BLD AUTO: 32.6 % (ref 15–51)
MAGNESIUM SERPL-MCNC: 1.5 MG/DL (ref 1.7–2.5)
MCH RBC QN AUTO: 30.9 PG (ref 29–33)
MCHC RBC AUTO-ENTMCNC: 35.4 G/DL (ref 32–37)
MCV RBC AUTO: 87.1 FL (ref 82–101)
MONOCYTES # BLD: 0.6 10^3/UL (ref 0.3–0.9)
MONOCYTES NFR BLD: 7.3 % (ref 0–11)
NEUTROPHILS # BLD: 4 10^3/UL (ref 1.6–7.5)
NEUTROPHILS NFR BLD AUTO: 52.4 % (ref 39–77)
NRBC # BLD MANUAL: 0 10^3/UL (ref 0–0)
NRBC BLD AUTO-RTO: 0 /100WBC (ref 0–0)
PHOSPHATE SERPL-MCNC: 4.2 MG/DL (ref 2.5–4.9)
PLATELET # BLD: 172 10^3/UL (ref 140–415)
PMV BLD AUTO: 11.9 FL (ref 7.4–10.4)
POTASSIUM SERPL-SCNC: 4.4 MMOL/L (ref 3.5–5.1)
RBC # BLD AUTO: 3.11 10^6/UL (ref 4.7–6.1)
SODIUM SERPL-SCNC: 139 MMOL/L (ref 135–144)
WBC # BLD AUTO: 7.6 10^3/UL (ref 4.8–10.8)

## 2017-12-13 RX ADMIN — SODIUM CITRATE AND CITRIC ACID MONOHYDRATE SCH ML: 500; 334 SOLUTION ORAL at 08:10

## 2017-12-13 RX ADMIN — SILVER SULFADIAZINE SCH APPLIC: 10 CREAM TOPICAL at 08:10

## 2017-12-13 RX ADMIN — TAMSULOSIN HYDROCHLORIDE SCH MG: 0.4 CAPSULE ORAL at 08:09

## 2017-12-13 RX ADMIN — HEPARIN SODIUM SCH UNIT: 5000 INJECTION, SOLUTION INTRAVENOUS; SUBCUTANEOUS at 06:00

## 2017-12-13 RX ADMIN — GABAPENTIN SCH MG: 300 CAPSULE ORAL at 12:29

## 2017-12-13 RX ADMIN — GABAPENTIN SCH MG: 300 CAPSULE ORAL at 08:09

## 2017-12-13 RX ADMIN — LEVOTHYROXINE SODIUM SCH MCG: 75 TABLET ORAL at 06:00

## 2017-12-13 RX ADMIN — Medication SCH EACH: at 08:09

## 2017-12-13 RX ADMIN — OMEGA-3 FATTY ACIDS CAP DELAYED RELEASE 1000 MG SCH MG: 1000 CAPSULE DELAYED RELEASE at 08:09

## 2017-12-13 RX ADMIN — NIFEDIPINE SCH MG: 30 TABLET, FILM COATED, EXTENDED RELEASE ORAL at 08:09

## 2017-12-13 NOTE — PDOCDIS
Discharge Instructions


DIAGNOSIS


Discharge Diagnosis


Renal failure.





CONDITION


Patient Condition:  Stable





HOME CARE INSTRUCTIONS:


Special Diet:  Renal, carb controlled.





FOLLOW UP/APPOINTMENTS


Follow-up Plan


Eliseo Tomas MD 


Specialty: Internal Medicine 


Office Address: [address] [place] [id number] 


Office Telephone: [phone]





OTHER ORDERS:


Other Orders:


1.  Take medications as per prescription.  Stop taking metformin and losartan.  

Avoid using NSAIDs (Motrin, ibuprofen, etc.).


2.  Take a carbohydrate controlled, low potassium diet.


3.  Resume activities as tolerated.


4.  Empty the Shaw bag as instructed.


5.  Follow-up with your primary care physician in 1 week.  If you do not have a 

primary care physician, please call Dr. Eliseo Tomas's office.  Please have your 

primary care physician arrange for outpatient urology follow-up versus follow-

up with urology department at [facility].





1. Loyal medicamentos segn la receta. Deje de chadd metformina y losartn. 

Evite el uso de TORSTEN (Motrin, ibuprofeno, etc.).


2. Loyal donnie dieta baja en potasio controlada con carbohidratos. 


3. Reanudar actividades segn lo tolera. 


4. Vace la bolsa Shaw reema se indica. 


5. Tiffanie un seguimiento con valle mdico de atencin primaria en 1 semana. Si no 

tiene un mdico de atencin primaria, llame a la oficina del Dr. Eliseo Tomas. 

Solicite a valle mdico de atencin primaria que coordine el seguimiento de la 

urologa reema paciente externo en comparacin con el seguimiento con el 

departamento de urologa de [facility].











SELMA DELGADO NP Dec 13, 2017 10:37

## 2017-12-13 NOTE — CONS
Date/Time of Note


Date/Time of Note


DATE: 12/13/17 


TIME: 23:08





Assessment/Plan


Assessment/Plan


Additional Assessment/Plan


1. Non Oliguric Acute kidney injury vs acute kidney injury on CKD 


2. BPH with urinary retention s/p Shaw catheter placement 


4. H/o possible CKD due to DM nephropathy, pt is not aware about it or has not 

seen any nephrologist as outpatient, I doubt it 


5. DM II


6. HTN


7. Anemia combinatino of iron deficiency anemia and anemia of chronic renal 

disease 


8. LE significant edmea, US negative for DVT, ECHO showed EF 60% with stage I 

diastoilc dysfunction 


9. Accelerated HTN





Plan:


BUN/Cr still remains elevated, , electrolyte stable today 


monitor Renal function and electrolyes 


Bp stable with  Procardia Xl to 30mg po daily and use IV hydralazine prn SBP> 

150 mm Hg 


we will continue to follow





Consultation Date/Type/Reason


Admit Date/Time


Dec 1, 2017 at 22:22


Initial Consult Date


12/2/17


Type of Consultation:  NEPHROLOGY


Referring Provider:  MARCOS BOYLE





24 HR Interval Summary


Free Text/Dictation


seen inEarly AM today





Exam/Review of Systems


Vital Signs


Vitals





 Vital Signs








  Date Time  Temp Pulse Resp B/P Pulse Ox O2 Delivery O2 Flow Rate FiO2


 


12/13/17 12:07  66      


 


12/13/17 11:56 98.9  18 155/77 97   


 


12/11/17 11:19      Room Air  














 Intake and Output   


 


 12/12/17 12/12/17 12/13/17





 15:00 23:00 07:00


 


Intake Total 450 ml 1080 ml 400 ml


 


Output Total 550 ml 1800 ml 1100 ml


 


Balance -100 ml -720 ml -700 ml











Results


Result Diagram:  


12/13/17 0711                                                                  

              12/13/17 0711





Results 24 hrs





Laboratory Tests








Test


  12/13/17


07:11 12/13/17


07:39 12/13/17


11:33


 


White Blood Count 7.6    


 


Red Blood Count 3.11  L  


 


Hemoglobin 9.6  L  


 


Hematocrit 27.1  L  


 


Mean Corpuscular Volume 87.1    


 


Mean Corpuscular Hemoglobin 30.9    


 


Mean Corpuscular Hemoglobin


Concent 35.4  


  


  


 


 


Red Cell Distribution Width 12.7    


 


Platelet Count 172    


 


Mean Platelet Volume 11.9  H  


 


Neutrophils % 52.4    


 


Lymphocytes % 32.6    


 


Monocytes % 7.3    


 


Eosinophils % 6.7    


 


Basophils % 0.7    


 


Nucleated Red Blood Cells % 0.0    


 


Neutrophils # 4.0    


 


Lymphocytes # 2.5    


 


Monocytes # 0.6    


 


Eosinophils # 0.5    


 


Basophils # 0.1    


 


Nucleated Red Blood Cells # 0.0    


 


Sodium Level 139    


 


Potassium Level 4.4    


 


Chloride Level 107    


 


Carbon Dioxide Level 25    


 


Anion Gap 11    


 


Blood Urea Nitrogen 32  H  


 


Creatinine 3.07  H  


 


Glucose Level 106    


 


Calcium Level 8.7    


 


Phosphorus Level 4.2    


 


Magnesium Level 1.5  L  


 


Bedside Glucose  98   123  

















SANTIAGO MADISON MD Dec 13, 2017 23:09

## 2017-12-13 NOTE — DS
Date/Time of Note


Date/Time of Note


DATE: 12/13/17 


TIME: 13:53





Discharge Summary


Admission/Discharge Info


Admit Date/Time


Dec 1, 2017 at 22:22


Discharge Date/Time


Dec 13, 2017 at 13:43


Discharge Diagnosis


1.  Acute nonoliguric kidney injury.  





2.  Left lower extremity cellulitis.  





3.  Benign prostatic hypertrophy with urinary retention.





4.  Normocytic, normochromic anemia.





5.  Hypertension.  


   


6.  Diabetes mellitus type 2.





7.  Hypothyroidism.  





8.  Dyslipidemia with hypertriglyceridemia.





9.  Diabetic neuropathy.





10. Iron deficiency.





11. Pulmonary hypertension (mild).


Patient Condition:  Stable


Consults


1. Koffi Tomas MD, Nephrology.





2. Alfonso Corral MD, Urology.





3. Иван Braun MD, Infectious Diseases.


Procedures


CT Abdomen & Pelvis


IMPRESSION:


1. Mild left hydroureter and hydronephrosis, without identified obstructing 

calculus.


2. Right kidney is unremarkable, without evident hydroureter or hydronephrosis.


3. Gallbladder wall thickening versus pericholecystic fluid versus 

pericholecystic fat inflammatory changes, without gallstones.


4. Findings are suspicious for acute cholecystitis and recommend ultrasound 

correlation.


5. Shaw catheter in place, with mural thickening throughout the urinary 

bladder compatible with urinary outlet obstruction.


6. Prostatic hypertrophy with a prostate measuring up to about 57 mm in 

transverse dimension, exerting mass effect at the posterior inferior urinary 

bladder.


7. Small amount of pericardial fluid is nonspecific, and otherwise, the heart 

is not enlarged.


8. Small bilateral pleural effusions with possible loculation component.





Abdominal US


FINDINGS:


No gallstones are identified within the gallbladder.  


There is no pericholecystic fluid or gallbladder wall thickening.  


The common bile duct measures 2.7 mm in maximal dimension.  


No free fluid is identified. No abnormality of the liver is seen. The length of 

the right lobe of the liver equals 15.7 cm, within normal limits. There is 

hepatopetal portal venous flow. The pancreas is not well seen due to bowel gas. 

The right kidney measures 11.2 cm in length and is unremarkable.


 


IMPRESSION:


Pancreas not well seen. Otherwise unremarkable examination. Please see above.





B/L LE Venous Doppler Study


IMPRESSION:


No sonographic evidence for deep venous thrombosis.





2D Echocardiogram


Conclusions


1.   Normal left ventricular systolic function.  Normal left ventricular 


cavity size.  Normal left ventricular wall thickness.  Ejection fraction 


is visually estimated at 60 %.  Tissue Doppler/Mitral Doppler indices 


are consistent with impaired relaxation (Stage I diastolic dysfunction).


2.   Normal appearance of the mitral valve.  Mild mitral valve 


regurgitation.


3.   Normal appearance of the aortic valve.  No significant aortic 


stenosis or insufficiency.


4.   Normal appearance of the tricuspid valve.  Estimated peak PA systolic 


pressure 46 mmHg.  There is mild tricuspid regurgitation.


5.   Possible trivial pericardial effusion noted.


Hx of Present Illness


This is a 57-year-old  male with past medical history of essential 

hypertension, type 2 diabetes mellitus on metformin, dyslipidemia, and 

hypothyroidism who came to the emergency room with chief complaint of left 

lower extremity ulceration as well as edema that has been going on for 3 days.  

The patient was noticed to have evidence of acute kidney injury with a BUN and 

creatinine of 35 and 3.0 respectively.  The patient also had underlying 

hyperkalemia.  Provided the patient's history of present illness, his 

comorbidities, and the diagnostic findings, a clinical decision was made to 

admit the patient to inpatient setting to have him further evaluated.


Hospital Course


A nephrology consult was obtained.  The patient's urinalysis showed 3+ 

proteinuria.  The patient's renal ultrasound showed mild to moderate 

enlargement of prostate with bladder distention.  Consequently, the patient had 

a Shaw catheter inserted and urology consult was obtained.  The patient most 

probably has underlying chronic kidney disease secondary to his long-standing 

diabetes and hypertension.  The patient's baseline creatinine is unknown.  The 

patient had no evidence of any worsening uremia or other acute indications for 

initiation of any hemodialysis.  The patient was started on tamsulosin and 

Hytrin.  The patient had a trial of removal of Shaw catheter.  However, he had 

urinary retention with a postvoid residual of 700 mL.  The patient's CT scan of 

the abdomen and pelvis showed evidence of significant prostatic hypertrophy 

with the prostate measuring about 57 mm in transverse dimension exerting mass-

effect at the posterior inferior urinary bladder.  The patient ideally needs 

prostatic surgery.  The patient's insurance is capitated to [facility].  Therefore, the patient will be discharged home with a Shaw catheter 

in place to be followed up at [facility] versus outpatient Urology.  

The patient's ARBs and metformin was put on hold.





The patient's left lower extremity cellulitis was treated with antibiotics as 

per infectious diseases.  The patient's pan cultures remained negative.  The 

patient was noticed to have normocytic, normochromic anemia with evidence of 

underlying iron deficiency.  He was maintained on iron supplements.  The 

patient has underlying essential hypertension.  He was maintained on beta-

blockers.  The patient was later started on calcium channel blockers.  The 

patient's ARBs were discontinued because of worsening renal failure.  





The patient has underlying hypothyroidism.  He was maintained on Synthroid for 

the same.  He has underlying type 2 diabetes mellitus.  Metformin was put on 

hold.  The patient was maintained on sliding scale insulin.  The patient was 

seen and evaluated by diabetes educator.  As per diabetes educator, the patient 

needs to be on Lantus insulin and pre-meal insulin.  However, the patient's 

blood sugars were fairly well controlled without any basal insulin and needed 

minimal corrective insulin. The patient will be started on DDP4- inhibitors and 

to be followed up with outpatient internist for further management of his 

underlying diabetes.  The patient has underlying dyslipidemia and 

hypertriglyceridemia.  He was maintained on statins and fish oil.  He has 

underlying diabetic neuropathy and he was maintained on gabapentin. Of note, 

the patient is a CT scan of the abdomen and pelvis that was done on 12/10/2017 

showed findings suspicious for acute cholecystitis.  The patient's abdominal 

ultrasound that was done on 12/2/2017 was negative for any gallstones with no 

evidence of pericholecystic fluid or gallbladder wall thickening.  The patient 

had no evidence of any abdominal pain or findings suggestive of acute 

cholecystitis.





The patient had a stable hospital course.  The patient stable to be discharged 

home to be followed up with outpatient urology versus urology at [facility].





Discharge Instructions


1.  Take medications as per prescription.  Stop taking metformin and losartan.  

Avoid using NSAIDs (Motrin, ibuprofen, etc.).


2.  Take a carbohydrate controlled, low potassium diet.


3.  Resume activities as tolerated.


4.  Empty the Shaw bag as instructed.


5.  Follow-up with your primary care physician in 1 week.  If you do not have a 

primary care physician, please call Dr. Eliseo Tomas's office.  Please have your 

primary care physician arrange for outpatient urology follow-up versus follow-

up with urology department at [facility].





The patient verbalized understanding of his discharge instructions.





At this time I would like to thank all the consultants for seeing the patient 

and providing clinical recommendations.





Case discussed with Dr. Pacheco.


Home Meds


Active Scripts


Ferrous Sulfate* (Ferrous Sulfate*) 325 Mg Tabec, 325 MG PO BID, #60 TAB


   Prov:SELMA DELGADO NP         12/13/17


Linagliptin (TRADJENTA) 5 Mg Tablet, 5 MG PO DAILY, #30 TAB


   Prov:SELMA DELGADO NP         12/13/17


Tamsulosin Hcl* (Flomax*) 0.4 Mg Cap.er.24h, 0.4 MG PO DAILY, #30 CAP


   Prov:SELMA DELGADO PALMIRA         12/13/17


Terazosin Hcl* (Hytrin*) 1 Mg Cap, 1 MG PO HS, #30 CAP


   Prov:SELMA DELGADO NP         12/13/17


Gabapentin* (Gabapentin*) 300 Mg Capsule, 300 MG PO TID, #90 CAP


   Prov:SELMA DELGADO PALMIRA         12/13/17


Nifedipine (Procardia Xl) 30 Mg Tab.er.24, 30 MG PO DAILY, #30 TAB


   Prov:SELMA EDLGADO NP         12/13/17


Reported Medications


Levothyroxine Sodium* (Levothyroxine Sodium*) 75 Mcg Tablet, 75 MCG PO BEFORE 

BREAKFAST, #30 TAB


   12/1/17


Atorvastatin* (Atorvastatin*) 40 Mg Tablet, 40 MG PO QHS, #30 TAB


   12/1/17


Omega-3 Acid Ethyl Esters (Lovaza) 1 Gm Capsule, 1 GM PO BID, CAP


   12/1/17


Carvedilol* (Carvedilol*) 12.5 Mg Tablet, 12.5 MG PO BID, #60 TAB


   12/1/17


Discontinued Reported Medications


Neomy Sulf/Polymyx B Sulf/Pram (NEOSPORIN + PAIN RELIEF CREAM) 14.2 Gm Cream..g.

, 14.2 GM TP AS NEEDED


   12/1/17


Losartan Potassium* (Losartan Potassium*) 100 Mg Tablet, 100 MG PO DAILY, TAB


   12/1/17


Gabapentin* (Gabapentin*) 600 Mg Tablet, 600 MG PO TID, #90 TAB


   12/1/17


Discontinued Scripts


Cephalexin* (Keflex*) 500 Mg Capsule, 500 MG PO TID for 7 Days, CAP


   Prov:OLE MCKEON MD         12/1/17


Follow-up Plan


Eliseo Tomas MD 


Specialty: Internal Medicine 


Office Address: [address] [place] [address] 


Office Telephone: [phone]


Primary Care Provider


Care Physician No Primary


Time spent on discharge:  40 mins.


Pending Labs





Laboratory Tests








Test


  12/12/17


17:01 12/12/17


20:03 12/13/17


07:11 12/13/17


07:39


 


Bedside Glucose


  132mg/dL


() 175mg/dL


() 


  98mg/dL


()


 


White Blood Count


  


  


  7.610^3/ul


(4.8-10.8) 


 


 


Red Blood Count


  


  


  3.1110^6/ul


(4.70-6.10) 


 


 


Hemoglobin


  


  


  9.6g/dl


(14.0-18.0) 


 


 


Hematocrit


  


  


  27.1%


(42.0-52.0) 


 


 


Mean Corpuscular Volume


  


  


  87.1fl


(82.0-101.0) 


 


 


Mean Corpuscular Hemoglobin


  


  


  30.9pg


(29.0-33.0) 


 


 


Mean Corpuscular Hemoglobin


Concent 


  


  35.4g/dl


(32.0-37.0) 


 


 


Red Cell Distribution Width


  


  


  12.7%


(11.5-14.5) 


 


 


Platelet Count


  


  


  64310^3/UL


(140-415) 


 


 


Mean Platelet Volume


  


  


  11.9fl


(7.4-10.4) 


 


 


Neutrophils %


  


  


  52.4%


(39.0-77.0) 


 


 


Lymphocytes %


  


  


  32.6%


(15.0-51.0) 


 


 


Monocytes %


  


  


  7.3%


(0.0-11.0) 


 


 


Eosinophils %   6.7% (0.0-7.0)  


 


Basophils %   0.7% (0.0-2.0)  


 


Nucleated Red Blood Cells %


  


  


  0.0/100WBC


(0.0-0.0) 


 


 


Neutrophils #


  


  


  4.010^3/ul


(1.6-7.5) 


 


 


Lymphocytes #


  


  


  2.510^3/ul


(0.8-2.9) 


 


 


Monocytes #


  


  


  0.610^3/ul


(0.3-0.9) 


 


 


Eosinophils #


  


  


  0.510^3/ul


(0.0-0.5) 


 


 


Basophils #


  


  


  0.110^3/ul


(0.0-0.1) 


 


 


Nucleated Red Blood Cells #


  


  


  0.010^3/ul


(0.0-0.0) 


 


 


Sodium Level


  


  


  139mmol/L


(135-144) 


 


 


Potassium Level


  


  


  4.4mmol/L


(3.5-5.1) 


 


 


Chloride Level


  


  


  107mmol/L


() 


 


 


Carbon Dioxide Level


  


  


  25mmol/L


(21-31) 


 


 


Anion Gap   11 (8-16)  


 


Blood Urea Nitrogen   32mg/dl (7-20)  


 


Creatinine


  


  


  3.07mg/dl


(0.61-1.24) 


 


 


Glucose Level


  


  


  106mg/dl


() 


 


 


Calcium Level


  


  


  8.7mg/dl


(8.4-10.2) 


 


 


Phosphorus Level


  


  


  4.2mg/dl


(2.5-4.9) 


 


 


Magnesium Level


  


  


  1.5mg/dl


(1.7-2.5) 


 














Test


  12/13/17


11:33 


  


  


 


 


Bedside Glucose


  123mg/dL


() 


  


  


 

















SELMA DELGADO NP Dec 13, 2017 13:54

## 2018-05-20 ENCOUNTER — HOSPITAL ENCOUNTER (INPATIENT)
Dept: HOSPITAL 91 - E/R | Age: 58
LOS: 4 days | Discharge: ANOTHER HOSPITAL | DRG: 291 | End: 2018-05-24
Payer: COMMERCIAL

## 2018-05-20 ENCOUNTER — HOSPITAL ENCOUNTER (INPATIENT)
Age: 58
LOS: 4 days | Discharge: ANOTHER HOSPITAL | DRG: 291 | End: 2018-05-24

## 2018-05-20 DIAGNOSIS — N18.4: ICD-10-CM

## 2018-05-20 DIAGNOSIS — N17.9: ICD-10-CM

## 2018-05-20 DIAGNOSIS — E87.5: ICD-10-CM

## 2018-05-20 DIAGNOSIS — N40.0: ICD-10-CM

## 2018-05-20 DIAGNOSIS — E83.39: ICD-10-CM

## 2018-05-20 DIAGNOSIS — I44.7: ICD-10-CM

## 2018-05-20 DIAGNOSIS — E87.2: ICD-10-CM

## 2018-05-20 DIAGNOSIS — K21.9: ICD-10-CM

## 2018-05-20 DIAGNOSIS — Z79.4: ICD-10-CM

## 2018-05-20 DIAGNOSIS — J96.01: ICD-10-CM

## 2018-05-20 DIAGNOSIS — E11.21: ICD-10-CM

## 2018-05-20 DIAGNOSIS — E11.22: ICD-10-CM

## 2018-05-20 DIAGNOSIS — I13.0: Primary | ICD-10-CM

## 2018-05-20 DIAGNOSIS — I50.43: ICD-10-CM

## 2018-05-20 DIAGNOSIS — I42.9: ICD-10-CM

## 2018-05-20 DIAGNOSIS — E78.5: ICD-10-CM

## 2018-05-20 DIAGNOSIS — D63.1: ICD-10-CM

## 2018-05-20 LAB
ADD MAN DIFF?: NO
ALANINE AMINOTRANSFERASE: 26 IU/L (ref 13–69)
ALBUMIN/GLOBULIN RATIO: 1.05
ALBUMIN: 3.8 G/DL (ref 3.3–4.9)
ALKALINE PHOSPHATASE: 130 IU/L (ref 42–121)
ANION GAP: 16 (ref 8–16)
ASPARTATE AMINO TRANSFERASE: 43 IU/L (ref 15–46)
B-TYPE NATRIURETIC PEPTIDE: (no result) PG/ML (ref 0–125)
BASOPHIL #: 0.1 10^3/UL (ref 0–0.1)
BASOPHILS %: 0.4 % (ref 0–2)
BILIRUBIN,DIRECT: 0 MG/DL (ref 0–0.2)
BILIRUBIN,TOTAL: 0.9 MG/DL (ref 0.2–1.3)
BLOOD UREA NITROGEN: 61 MG/DL (ref 7–20)
CALCIUM: 8.1 MG/DL (ref 8.4–10.2)
CARBON DIOXIDE: 18 MMOL/L (ref 21–31)
CHLORIDE: 108 MMOL/L (ref 97–110)
CREATININE: 6.54 MG/DL (ref 0.61–1.24)
EOSINOPHILS #: 0.3 10^3/UL (ref 0–0.5)
EOSINOPHILS %: 2.6 % (ref 0–7)
GLOBULIN: 3.6 G/DL (ref 1.3–3.2)
GLUCOSE: 121 MG/DL (ref 70–220)
HEMATOCRIT: 29.3 % (ref 42–52)
HEMOGLOBIN: 9.8 G/DL (ref 14–18)
INR: 1.02
LYMPHOCYTES #: 1.3 10^3/UL (ref 0.8–2.9)
LYMPHOCYTES %: 10.7 % (ref 15–51)
MEAN CORPUSCULAR HEMOGLOBIN: 30.4 PG (ref 29–33)
MEAN CORPUSCULAR HGB CONC: 33.4 G/DL (ref 32–37)
MEAN CORPUSCULAR VOLUME: 91 FL (ref 82–101)
MEAN PLATELET VOLUME: 11.2 FL (ref 7.4–10.4)
MONOCYTE #: 0.7 10^3/UL (ref 0.3–0.9)
MONOCYTES %: 6.1 % (ref 0–11)
NEUTROPHIL #: 9.5 10^3/UL (ref 1.6–7.5)
NEUTROPHILS %: 79.8 % (ref 39–77)
NUCLEATED RED BLOOD CELLS #: 0 10^3/UL (ref 0–0)
NUCLEATED RED BLOOD CELLS%: 0 /100WBC (ref 0–0)
PARTIAL THROMBOPLASTIN TIME: 35.7 SEC (ref 25–35)
PLATELET COUNT: 153 10^3/UL (ref 140–415)
POTASSIUM: 4.9 MMOL/L (ref 3.5–5.1)
PROTIME: 13.5 SEC (ref 11.9–14.9)
PT RATIO: 1.1
RED BLOOD COUNT: 3.22 10^6/UL (ref 4.7–6.1)
RED CELL DISTRIBUTION WIDTH: 12.5 % (ref 11.5–14.5)
SODIUM: 137 MMOL/L (ref 135–144)
TOTAL PROTEIN: 7.4 G/DL (ref 6.1–8.1)
TROPONIN-I: 0.02 NG/ML (ref 0–0.12)
TROPONIN-I: 0.03 NG/ML (ref 0–0.12)
WHITE BLOOD COUNT: 11.9 10^3/UL (ref 4.8–10.8)

## 2018-05-20 PROCEDURE — 84300 ASSAY OF URINE SODIUM: CPT

## 2018-05-20 PROCEDURE — 84560 ASSAY OF URINE/URIC ACID: CPT

## 2018-05-20 PROCEDURE — 78452 HT MUSCLE IMAGE SPECT MULT: CPT

## 2018-05-20 PROCEDURE — 89190 NASAL SMEAR FOR EOSINOPHILS: CPT

## 2018-05-20 PROCEDURE — 82962 GLUCOSE BLOOD TEST: CPT

## 2018-05-20 PROCEDURE — 85610 PROTHROMBIN TIME: CPT

## 2018-05-20 PROCEDURE — 93017 CV STRESS TEST TRACING ONLY: CPT

## 2018-05-20 PROCEDURE — 81003 URINALYSIS AUTO W/O SCOPE: CPT

## 2018-05-20 PROCEDURE — 80061 LIPID PANEL: CPT

## 2018-05-20 PROCEDURE — 85025 COMPLETE CBC W/AUTO DIFF WBC: CPT

## 2018-05-20 PROCEDURE — 99291 CRITICAL CARE FIRST HOUR: CPT

## 2018-05-20 PROCEDURE — 82550 ASSAY OF CK (CPK): CPT

## 2018-05-20 PROCEDURE — 71046 X-RAY EXAM CHEST 2 VIEWS: CPT

## 2018-05-20 PROCEDURE — 80053 COMPREHEN METABOLIC PANEL: CPT

## 2018-05-20 PROCEDURE — 5A09357 ASSISTANCE WITH RESPIRATORY VENTILATION, LESS THAN 24 CONSECUTIVE HOURS, CONTINUOUS POSITIVE AIRWAY PRESSURE: ICD-10-PCS

## 2018-05-20 PROCEDURE — 82728 ASSAY OF FERRITIN: CPT

## 2018-05-20 PROCEDURE — 82306 VITAMIN D 25 HYDROXY: CPT

## 2018-05-20 PROCEDURE — 96374 THER/PROPH/DIAG INJ IV PUSH: CPT

## 2018-05-20 PROCEDURE — 85730 THROMBOPLASTIN TIME PARTIAL: CPT

## 2018-05-20 PROCEDURE — 83970 ASSAY OF PARATHORMONE: CPT

## 2018-05-20 PROCEDURE — 36415 COLL VENOUS BLD VENIPUNCTURE: CPT

## 2018-05-20 PROCEDURE — 84439 ASSAY OF FREE THYROXINE: CPT

## 2018-05-20 PROCEDURE — 84443 ASSAY THYROID STIM HORMONE: CPT

## 2018-05-20 PROCEDURE — 84155 ASSAY OF PROTEIN SERUM: CPT

## 2018-05-20 PROCEDURE — 83880 ASSAY OF NATRIURETIC PEPTIDE: CPT

## 2018-05-20 PROCEDURE — 76775 US EXAM ABDO BACK WALL LIM: CPT

## 2018-05-20 PROCEDURE — 93005 ELECTROCARDIOGRAM TRACING: CPT

## 2018-05-20 PROCEDURE — 96375 TX/PRO/DX INJ NEW DRUG ADDON: CPT

## 2018-05-20 PROCEDURE — 80048 BASIC METABOLIC PNL TOTAL CA: CPT

## 2018-05-20 PROCEDURE — 84484 ASSAY OF TROPONIN QUANT: CPT

## 2018-05-20 PROCEDURE — 84100 ASSAY OF PHOSPHORUS: CPT

## 2018-05-20 PROCEDURE — 83735 ASSAY OF MAGNESIUM: CPT

## 2018-05-20 PROCEDURE — 94660 CPAP INITIATION&MGMT: CPT

## 2018-05-20 PROCEDURE — 93306 TTE W/DOPPLER COMPLETE: CPT

## 2018-05-20 PROCEDURE — 83540 ASSAY OF IRON: CPT

## 2018-05-20 PROCEDURE — 71045 X-RAY EXAM CHEST 1 VIEW: CPT

## 2018-05-20 RX ADMIN — TERAZOSIN HYDROCHLORIDE ANHYDROUS 1 MG: 1 CAPSULE ORAL at 23:21

## 2018-05-20 RX ADMIN — ALBUTEROL SULFATE 1 PUFF: 90 AEROSOL, METERED RESPIRATORY (INHALATION) at 23:22

## 2018-05-20 RX ADMIN — ONDANSETRON HYDROCHLORIDE 1 MG: 2 INJECTION, SOLUTION INTRAMUSCULAR; INTRAVENOUS at 16:24

## 2018-05-20 RX ADMIN — INSULIN ASPART 1 UNIT: 100 INJECTION, SOLUTION INTRAVENOUS; SUBCUTANEOUS at 23:30

## 2018-05-20 RX ADMIN — NITROGLYCERIN 1 INCH: 20 OINTMENT TOPICAL at 16:24

## 2018-05-20 RX ADMIN — FUROSEMIDE 1 MG: 10 INJECTION, SOLUTION INTRAVENOUS at 16:23

## 2018-05-20 RX ADMIN — GABAPENTIN 1 MG: 100 CAPSULE ORAL at 23:21

## 2018-05-20 RX ADMIN — AMLODIPINE BESYLATE 1 MG: 10 TABLET ORAL at 23:22

## 2018-05-20 RX ADMIN — OMEGA-3 FATTY ACIDS CAP DELAYED RELEASE 1000 MG 1 MG: 1000 CAPSULE DELAYED RELEASE at 23:21

## 2018-05-20 RX ADMIN — TAMSULOSIN HYDROCHLORIDE 1 MG: 0.4 CAPSULE ORAL at 23:21

## 2018-05-20 RX ADMIN — ASPIRIN 325 MG ORAL TABLET 1 MG: 325 PILL ORAL at 16:24

## 2018-05-20 RX ADMIN — HEPARIN SODIUM 1 UNIT: 5000 INJECTION, SOLUTION INTRAVENOUS; SUBCUTANEOUS at 23:23

## 2018-05-21 LAB
% IRON SATURATION: 14 % SAT (ref 22–52)
ADD MAN DIFF?: NO
ANION GAP: 10 (ref 8–16)
BASOPHIL #: 0 10^3/UL (ref 0–0.1)
BASOPHILS %: 0.3 % (ref 0–2)
BLOOD UREA NITROGEN: 64 MG/DL (ref 7–20)
CALCIUM: 8.1 MG/DL (ref 8.4–10.2)
CARBON DIOXIDE: 23 MMOL/L (ref 21–31)
CHLORIDE: 109 MMOL/L (ref 97–110)
CREATINE KINASE: 1286 IU/L (ref 23–200)
CREATININE,URINE RANDOM: 45.79 MG/DL (ref 20–370)
CREATININE: 7.1 MG/DL (ref 0.61–1.24)
EOSINOPHILS #: 0.4 10^3/UL (ref 0–0.5)
EOSINOPHILS %: 4.3 % (ref 0–7)
FERRITIN: 386 NG/ML (ref 11.1–264)
FREE T4 (FREE THYROXINE): 1.4 NG/DL (ref 0.78–2.44)
GLUCOSE: 97 MG/DL (ref 70–220)
HEMATOCRIT: 24.6 % (ref 42–52)
HEMOGLOBIN: 8.1 G/DL (ref 14–18)
IRON: 28 UG/DL (ref 35–150)
LYMPHOCYTES #: 1.7 10^3/UL (ref 0.8–2.9)
LYMPHOCYTES %: 18.6 % (ref 15–51)
MAGNESIUM: 2.1 MG/DL (ref 1.7–2.5)
MEAN CORPUSCULAR HEMOGLOBIN: 30.2 PG (ref 29–33)
MEAN CORPUSCULAR HGB CONC: 32.9 G/DL (ref 32–37)
MEAN CORPUSCULAR VOLUME: 91.8 FL (ref 82–101)
MEAN PLATELET VOLUME: 11.2 FL (ref 7.4–10.4)
MONOCYTE #: 0.9 10^3/UL (ref 0.3–0.9)
MONOCYTES %: 9.4 % (ref 0–11)
NEUTROPHIL #: 6.1 10^3/UL (ref 1.6–7.5)
NEUTROPHILS %: 67.1 % (ref 39–77)
NUCLEATED RED BLOOD CELLS #: 0 10^3/UL (ref 0–0)
NUCLEATED RED BLOOD CELLS%: 0 /100WBC (ref 0–0)
PLATELET COUNT: 142 10^3/UL (ref 140–415)
POTASSIUM: 5.2 MMOL/L (ref 3.5–5.1)
PROTEIN URINE: 387 MG/DL (ref 0–11.9)
RED BLOOD COUNT: 2.68 10^6/UL (ref 4.7–6.1)
RED CELL DISTRIBUTION WIDTH: 12.7 % (ref 11.5–14.5)
SODIUM,URINE RANDOM: 96 MMOL/L (ref 30–90)
SODIUM: 137 MMOL/L (ref 135–144)
THYROID STIMULATING HORMONE: 4.36 MIU/L (ref 0.47–4.68)
TOTAL IRON BINDING CAPACITY: 203 UG/DL (ref 241–421)
TROPONIN-I: 0.02 NG/ML (ref 0–0.12)
URIC ACID: 7.2 MG/DL (ref 3.1–7.9)
URINE EOSINOPHILS: 0 % (ref 0–1.9)
WHITE BLOOD COUNT: 9.1 10^3/UL (ref 4.8–10.8)

## 2018-05-21 RX ADMIN — PANTOPRAZOLE SODIUM 1 MG: 40 TABLET, DELAYED RELEASE ORAL at 06:04

## 2018-05-21 RX ADMIN — FUROSEMIDE 1 MG: 10 INJECTION, SOLUTION INTRAVENOUS at 06:04

## 2018-05-21 RX ADMIN — OMEGA-3 FATTY ACIDS CAP DELAYED RELEASE 1000 MG 1 MG: 1000 CAPSULE DELAYED RELEASE at 09:34

## 2018-05-21 RX ADMIN — ALBUTEROL SULFATE 1 PUFF: 90 AEROSOL, METERED RESPIRATORY (INHALATION) at 17:10

## 2018-05-21 RX ADMIN — ALBUTEROL SULFATE 1 PUFF: 90 AEROSOL, METERED RESPIRATORY (INHALATION) at 01:00

## 2018-05-21 RX ADMIN — ALBUTEROL SULFATE 1 PUFF: 90 AEROSOL, METERED RESPIRATORY (INHALATION) at 20:11

## 2018-05-21 RX ADMIN — HEPARIN SODIUM 1 UNIT: 5000 INJECTION, SOLUTION INTRAVENOUS; SUBCUTANEOUS at 13:32

## 2018-05-21 RX ADMIN — GABAPENTIN 1 MG: 100 CAPSULE ORAL at 09:36

## 2018-05-21 RX ADMIN — ALBUTEROL SULFATE 1 PUFF: 90 AEROSOL, METERED RESPIRATORY (INHALATION) at 09:35

## 2018-05-21 RX ADMIN — HEPARIN SODIUM 1 UNIT: 5000 INJECTION, SOLUTION INTRAVENOUS; SUBCUTANEOUS at 06:03

## 2018-05-21 RX ADMIN — OMEGA-3 FATTY ACIDS CAP DELAYED RELEASE 1000 MG 1 MG: 1000 CAPSULE DELAYED RELEASE at 20:11

## 2018-05-21 RX ADMIN — INSULIN ASPART 1 UNIT: 100 INJECTION, SOLUTION INTRAVENOUS; SUBCUTANEOUS at 17:14

## 2018-05-21 RX ADMIN — HEPARIN SODIUM 1 UNIT: 5000 INJECTION, SOLUTION INTRAVENOUS; SUBCUTANEOUS at 21:12

## 2018-05-21 RX ADMIN — INSULIN ASPART 1 UNIT: 100 INJECTION, SOLUTION INTRAVENOUS; SUBCUTANEOUS at 07:35

## 2018-05-21 RX ADMIN — ALBUTEROL SULFATE 1 PUFF: 90 AEROSOL, METERED RESPIRATORY (INHALATION) at 13:33

## 2018-05-21 RX ADMIN — INSULIN ASPART 1 UNIT: 100 INJECTION, SOLUTION INTRAVENOUS; SUBCUTANEOUS at 11:45

## 2018-05-21 RX ADMIN — FUROSEMIDE 1 MG: 10 INJECTION, SOLUTION INTRAVENOUS at 17:09

## 2018-05-21 RX ADMIN — FERROUS SULFATE TAB 325 MG (65 MG ELEMENTAL FE) 1 MG: 325 (65 FE) TAB at 09:35

## 2018-05-21 RX ADMIN — TAMSULOSIN HYDROCHLORIDE 1 MG: 0.4 CAPSULE ORAL at 20:11

## 2018-05-21 RX ADMIN — TERAZOSIN HYDROCHLORIDE ANHYDROUS 1 MG: 1 CAPSULE ORAL at 20:12

## 2018-05-21 RX ADMIN — ALBUTEROL SULFATE 1 PUFF: 90 AEROSOL, METERED RESPIRATORY (INHALATION) at 05:04

## 2018-05-21 RX ADMIN — INSULIN ASPART 1 UNIT: 100 INJECTION, SOLUTION INTRAVENOUS; SUBCUTANEOUS at 20:37

## 2018-05-21 RX ADMIN — AMLODIPINE BESYLATE 1 MG: 10 TABLET ORAL at 09:34

## 2018-05-22 LAB
ADD MAN DIFF?: NO
ALANINE AMINOTRANSFERASE: 23 IU/L (ref 13–69)
ALBUMIN/GLOBULIN RATIO: 0.93
ALBUMIN: 2.7 G/DL (ref 3.3–4.9)
ALKALINE PHOSPHATASE: 89 IU/L (ref 42–121)
ANION GAP: 17 (ref 8–16)
ASPARTATE AMINO TRANSFERASE: 23 IU/L (ref 15–46)
BASOPHIL #: 0 10^3/UL (ref 0–0.1)
BASOPHILS %: 0.4 % (ref 0–2)
BILIRUBIN,DIRECT: 0 MG/DL (ref 0–0.2)
BILIRUBIN,TOTAL: 1.1 MG/DL (ref 0.2–1.3)
BLOOD UREA NITROGEN: 68 MG/DL (ref 7–20)
CALCIUM: 7.7 MG/DL (ref 8.4–10.2)
CARBON DIOXIDE: 19 MMOL/L (ref 21–31)
CHLORIDE: 107 MMOL/L (ref 97–110)
CHOL/HDL RATIO: 4 RATIO
CHOLESTEROL: 144 MG/DL (ref 100–200)
CREATINE KINASE: 955 IU/L (ref 23–200)
CREATININE: 7.63 MG/DL (ref 0.61–1.24)
EOSINOPHILS #: 0.4 10^3/UL (ref 0–0.5)
EOSINOPHILS %: 5 % (ref 0–7)
GLOBULIN: 2.9 G/DL (ref 1.3–3.2)
GLUCOSE: 91 MG/DL (ref 70–220)
HDL CHOLESTEROL: 36 MG/DL (ref 30–78)
HEMATOCRIT: 22.8 % (ref 42–52)
HEMOGLOBIN: 7.7 G/DL (ref 14–18)
LDL CHOLESTEROL,CALCULATED: 92 MG/DL
LYMPHOCYTES #: 1.4 10^3/UL (ref 0.8–2.9)
LYMPHOCYTES %: 16.5 % (ref 15–51)
MAGNESIUM: 2 MG/DL (ref 1.7–2.5)
MEAN CORPUSCULAR HEMOGLOBIN: 30.4 PG (ref 29–33)
MEAN CORPUSCULAR HGB CONC: 33.8 G/DL (ref 32–37)
MEAN CORPUSCULAR VOLUME: 90.1 FL (ref 82–101)
MEAN PLATELET VOLUME: 11.5 FL (ref 7.4–10.4)
MONOCYTE #: 0.8 10^3/UL (ref 0.3–0.9)
MONOCYTES %: 9.1 % (ref 0–11)
NEUTROPHIL #: 5.8 10^3/UL (ref 1.6–7.5)
NEUTROPHILS %: 68.8 % (ref 39–77)
NUCLEATED RED BLOOD CELLS #: 0 10^3/UL (ref 0–0)
NUCLEATED RED BLOOD CELLS%: 0 /100WBC (ref 0–0)
PHOSPHORUS: 6.1 MG/DL (ref 2.5–4.9)
PLATELET COUNT: 121 10^3/UL (ref 140–415)
POSITIVE DIFF: (no result)
POTASSIUM: 5 MMOL/L (ref 3.5–5.1)
RED BLOOD COUNT: 2.53 10^6/UL (ref 4.7–6.1)
RED CELL DISTRIBUTION WIDTH: 12.3 % (ref 11.5–14.5)
SODIUM: 138 MMOL/L (ref 135–144)
TOTAL PROTEIN: 5.6 G/DL (ref 6.1–8.1)
TRIGLYCERIDES: 79 MG/DL (ref 0–149)
WHITE BLOOD COUNT: 8.4 10^3/UL (ref 4.8–10.8)

## 2018-05-22 RX ADMIN — INSULIN ASPART 1 UNIT: 100 INJECTION, SOLUTION INTRAVENOUS; SUBCUTANEOUS at 11:45

## 2018-05-22 RX ADMIN — SEVELAMER CARBONATE 1 GM: 800 POWDER, FOR SUSPENSION ORAL at 11:57

## 2018-05-22 RX ADMIN — SODIUM FERRIC GLUCONATE COMPLEX 1 MLS/HR: 12.5 INJECTION INTRAVENOUS at 16:12

## 2018-05-22 RX ADMIN — PANTOPRAZOLE SODIUM 1 MG: 40 TABLET, DELAYED RELEASE ORAL at 05:46

## 2018-05-22 RX ADMIN — INSULIN ASPART 1 UNIT: 100 INJECTION, SOLUTION INTRAVENOUS; SUBCUTANEOUS at 20:23

## 2018-05-22 RX ADMIN — FUROSEMIDE 1 MG: 10 INJECTION, SOLUTION INTRAVENOUS at 05:46

## 2018-05-22 RX ADMIN — SODIUM CITRATE AND CITRIC ACID MONOHYDRATE 1 ML: 500; 334 SOLUTION ORAL at 20:17

## 2018-05-22 RX ADMIN — ALBUTEROL SULFATE 1 PUFF: 90 AEROSOL, METERED RESPIRATORY (INHALATION) at 12:44

## 2018-05-22 RX ADMIN — INSULIN ASPART 1 UNIT: 100 INJECTION, SOLUTION INTRAVENOUS; SUBCUTANEOUS at 07:35

## 2018-05-22 RX ADMIN — ALBUTEROL SULFATE 1 PUFF: 90 AEROSOL, METERED RESPIRATORY (INHALATION) at 16:12

## 2018-05-22 RX ADMIN — SEVELAMER CARBONATE 1 GM: 800 POWDER, FOR SUSPENSION ORAL at 17:08

## 2018-05-22 RX ADMIN — ATORVASTATIN CALCIUM 1 MG: 20 TABLET, FILM COATED ORAL at 20:18

## 2018-05-22 RX ADMIN — ACETAMINOPHEN 1 MG: 325 TABLET, FILM COATED ORAL at 15:22

## 2018-05-22 RX ADMIN — AMLODIPINE BESYLATE 1 MG: 10 TABLET ORAL at 09:07

## 2018-05-22 RX ADMIN — TERAZOSIN HYDROCHLORIDE ANHYDROUS 1 MG: 1 CAPSULE ORAL at 20:17

## 2018-05-22 RX ADMIN — FUROSEMIDE 1 MG: 10 INJECTION, SOLUTION INTRAVENOUS at 17:09

## 2018-05-22 RX ADMIN — ALBUTEROL SULFATE 1 PUFF: 90 AEROSOL, METERED RESPIRATORY (INHALATION) at 20:18

## 2018-05-22 RX ADMIN — HEPARIN SODIUM 1 UNIT: 5000 INJECTION, SOLUTION INTRAVENOUS; SUBCUTANEOUS at 05:52

## 2018-05-22 RX ADMIN — OMEGA-3 FATTY ACIDS CAP DELAYED RELEASE 1000 MG 1 MG: 1000 CAPSULE DELAYED RELEASE at 09:07

## 2018-05-22 RX ADMIN — ALBUTEROL SULFATE 1 PUFF: 90 AEROSOL, METERED RESPIRATORY (INHALATION) at 09:06

## 2018-05-22 RX ADMIN — ALBUTEROL SULFATE 1 PUFF: 90 AEROSOL, METERED RESPIRATORY (INHALATION) at 05:46

## 2018-05-22 RX ADMIN — SODIUM CITRATE AND CITRIC ACID MONOHYDRATE 1 ML: 500; 334 SOLUTION ORAL at 10:10

## 2018-05-22 RX ADMIN — EPOETIN ALFA 1 UNITS: 3000 SOLUTION INTRAVENOUS; SUBCUTANEOUS at 17:43

## 2018-05-22 RX ADMIN — INSULIN ASPART 1 UNIT: 100 INJECTION, SOLUTION INTRAVENOUS; SUBCUTANEOUS at 17:11

## 2018-05-22 RX ADMIN — ASPIRIN 1 MG: 81 TABLET, COATED ORAL at 13:19

## 2018-05-22 RX ADMIN — TAMSULOSIN HYDROCHLORIDE 1 MG: 0.4 CAPSULE ORAL at 20:17

## 2018-05-22 RX ADMIN — ALBUTEROL SULFATE 1 PUFF: 90 AEROSOL, METERED RESPIRATORY (INHALATION) at 00:11

## 2018-05-22 RX ADMIN — FERROUS SULFATE TAB 325 MG (65 MG ELEMENTAL FE) 1 MG: 325 (65 FE) TAB at 09:06

## 2018-05-22 RX ADMIN — OMEGA-3 FATTY ACIDS CAP DELAYED RELEASE 1000 MG 1 MG: 1000 CAPSULE DELAYED RELEASE at 20:17

## 2018-05-23 LAB
ADD MAN DIFF?: NO
ANION GAP: 16 (ref 8–16)
BASOPHIL #: 0 10^3/UL (ref 0–0.1)
BASOPHILS %: 0.4 % (ref 0–2)
BLOOD UREA NITROGEN: 77 MG/DL (ref 7–20)
CALCIUM: 7.9 MG/DL (ref 8.4–10.2)
CARBON DIOXIDE: 22 MMOL/L (ref 21–31)
CHLORIDE: 105 MMOL/L (ref 97–110)
CREATININE: 8.46 MG/DL (ref 0.61–1.24)
EOSINOPHILS #: 0.6 10^3/UL (ref 0–0.5)
EOSINOPHILS %: 7.2 % (ref 0–7)
GLUCOSE: 93 MG/DL (ref 70–220)
HEMATOCRIT: 24.4 % (ref 42–52)
HEMOGLOBIN: 8.1 G/DL (ref 14–18)
LYMPHOCYTES #: 1.2 10^3/UL (ref 0.8–2.9)
LYMPHOCYTES %: 14.6 % (ref 15–51)
MAGNESIUM: 2 MG/DL (ref 1.7–2.5)
MEAN CORPUSCULAR HEMOGLOBIN: 29.9 PG (ref 29–33)
MEAN CORPUSCULAR HGB CONC: 33.2 G/DL (ref 32–37)
MEAN CORPUSCULAR VOLUME: 90 FL (ref 82–101)
MEAN PLATELET VOLUME: 11.7 FL (ref 7.4–10.4)
MONOCYTE #: 0.7 10^3/UL (ref 0.3–0.9)
MONOCYTES %: 9 % (ref 0–11)
NEUTROPHIL #: 5.6 10^3/UL (ref 1.6–7.5)
NEUTROPHILS %: 68.6 % (ref 39–77)
NUCLEATED RED BLOOD CELLS #: 0 10^3/UL (ref 0–0)
NUCLEATED RED BLOOD CELLS%: 0 /100WBC (ref 0–0)
PHOSPHORUS: 6.5 MG/DL (ref 2.5–4.9)
PLATELET COUNT: 134 10^3/UL (ref 140–415)
POTASSIUM: 5.1 MMOL/L (ref 3.5–5.1)
RED BLOOD COUNT: 2.71 10^6/UL (ref 4.7–6.1)
RED CELL DISTRIBUTION WIDTH: 12.2 % (ref 11.5–14.5)
SODIUM: 138 MMOL/L (ref 135–144)
WHITE BLOOD COUNT: 8.2 10^3/UL (ref 4.8–10.8)

## 2018-05-23 RX ADMIN — FERROUS SULFATE TAB 325 MG (65 MG ELEMENTAL FE) 1 MG: 325 (65 FE) TAB at 08:28

## 2018-05-23 RX ADMIN — INSULIN ASPART 1 UNIT: 100 INJECTION, SOLUTION INTRAVENOUS; SUBCUTANEOUS at 22:00

## 2018-05-23 RX ADMIN — INSULIN ASPART 1 UNIT: 100 INJECTION, SOLUTION INTRAVENOUS; SUBCUTANEOUS at 07:35

## 2018-05-23 RX ADMIN — INSULIN ASPART 1 UNIT: 100 INJECTION, SOLUTION INTRAVENOUS; SUBCUTANEOUS at 11:45

## 2018-05-23 RX ADMIN — ALBUTEROL SULFATE 1 PUFF: 90 AEROSOL, METERED RESPIRATORY (INHALATION) at 05:25

## 2018-05-23 RX ADMIN — ALBUTEROL SULFATE 1 PUFF: 90 AEROSOL, METERED RESPIRATORY (INHALATION) at 00:35

## 2018-05-23 RX ADMIN — TAMSULOSIN HYDROCHLORIDE 1 MG: 0.4 CAPSULE ORAL at 22:02

## 2018-05-23 RX ADMIN — INSULIN ASPART 1 UNIT: 100 INJECTION, SOLUTION INTRAVENOUS; SUBCUTANEOUS at 17:11

## 2018-05-23 RX ADMIN — DOCUSATE SODIUM 1 MG: 100 CAPSULE, LIQUID FILLED ORAL at 08:28

## 2018-05-23 RX ADMIN — ASPIRIN 1 MG: 81 TABLET, COATED ORAL at 08:28

## 2018-05-23 RX ADMIN — TERAZOSIN HYDROCHLORIDE ANHYDROUS 1 MG: 1 CAPSULE ORAL at 22:02

## 2018-05-23 RX ADMIN — SEVELAMER CARBONATE 1 GM: 800 POWDER, FOR SUSPENSION ORAL at 07:35

## 2018-05-23 RX ADMIN — OMEGA-3 FATTY ACIDS CAP DELAYED RELEASE 1000 MG 1 MG: 1000 CAPSULE DELAYED RELEASE at 22:02

## 2018-05-23 RX ADMIN — AMLODIPINE BESYLATE 1 MG: 10 TABLET ORAL at 12:41

## 2018-05-23 RX ADMIN — ALBUTEROL SULFATE 1 PUFF: 90 AEROSOL, METERED RESPIRATORY (INHALATION) at 12:41

## 2018-05-23 RX ADMIN — REGADENOSON 1 MG: 0.08 INJECTION, SOLUTION INTRAVENOUS at 11:45

## 2018-05-23 RX ADMIN — SEVELAMER CARBONATE 1 GM: 800 POWDER, FOR SUSPENSION ORAL at 17:09

## 2018-05-23 RX ADMIN — FUROSEMIDE 1 MG: 10 INJECTION, SOLUTION INTRAVENOUS at 05:25

## 2018-05-23 RX ADMIN — SODIUM CITRATE AND CITRIC ACID MONOHYDRATE 1 ML: 500; 334 SOLUTION ORAL at 22:02

## 2018-05-23 RX ADMIN — SEVELAMER CARBONATE 1 GM: 800 POWDER, FOR SUSPENSION ORAL at 12:40

## 2018-05-23 RX ADMIN — ALBUTEROL SULFATE 1 PUFF: 90 AEROSOL, METERED RESPIRATORY (INHALATION) at 22:03

## 2018-05-23 RX ADMIN — OMEGA-3 FATTY ACIDS CAP DELAYED RELEASE 1000 MG 1 MG: 1000 CAPSULE DELAYED RELEASE at 08:28

## 2018-05-23 RX ADMIN — PANTOPRAZOLE SODIUM 1 MG: 40 TABLET, DELAYED RELEASE ORAL at 05:25

## 2018-05-23 RX ADMIN — ATORVASTATIN CALCIUM 1 MG: 20 TABLET, FILM COATED ORAL at 22:02

## 2018-05-23 RX ADMIN — SODIUM CITRATE AND CITRIC ACID MONOHYDRATE 1 ML: 500; 334 SOLUTION ORAL at 08:28

## 2018-05-23 RX ADMIN — ALBUTEROL SULFATE 1 PUFF: 90 AEROSOL, METERED RESPIRATORY (INHALATION) at 08:28

## 2018-05-23 RX ADMIN — SODIUM FERRIC GLUCONATE COMPLEX 1 MLS/HR: 12.5 INJECTION INTRAVENOUS at 16:12

## 2018-05-23 RX ADMIN — ALBUTEROL SULFATE 1 PUFF: 90 AEROSOL, METERED RESPIRATORY (INHALATION) at 16:15

## 2018-05-23 RX ADMIN — ACETAMINOPHEN 1 MG: 325 TABLET, FILM COATED ORAL at 23:32

## 2018-05-24 LAB
ADD MAN DIFF?: NO
ANION GAP: 19 (ref 8–16)
BASOPHIL #: 0 10^3/UL (ref 0–0.1)
BASOPHILS %: 0.5 % (ref 0–2)
BLOOD UREA NITROGEN: 78 MG/DL (ref 7–20)
CALCIUM: 8 MG/DL (ref 8.4–10.2)
CARBON DIOXIDE: 22 MMOL/L (ref 21–31)
CHLORIDE: 102 MMOL/L (ref 97–110)
CREATININE: 9.02 MG/DL (ref 0.61–1.24)
EOSINOPHILS #: 0.7 10^3/UL (ref 0–0.5)
EOSINOPHILS %: 9.5 % (ref 0–7)
GLUCOSE: 93 MG/DL (ref 70–220)
HEMATOCRIT: 23.9 % (ref 42–52)
HEMOGLOBIN: 8.2 G/DL (ref 14–18)
LYMPHOCYTES #: 2 10^3/UL (ref 0.8–2.9)
LYMPHOCYTES %: 26 % (ref 15–51)
MAGNESIUM: 2 MG/DL (ref 1.7–2.5)
MEAN CORPUSCULAR HEMOGLOBIN: 30.8 PG (ref 29–33)
MEAN CORPUSCULAR HGB CONC: 34.3 G/DL (ref 32–37)
MEAN CORPUSCULAR VOLUME: 89.8 FL (ref 82–101)
MEAN PLATELET VOLUME: 11.5 FL (ref 7.4–10.4)
MONOCYTE #: 0.7 10^3/UL (ref 0.3–0.9)
MONOCYTES %: 8.9 % (ref 0–11)
NEUTROPHIL #: 4.1 10^3/UL (ref 1.6–7.5)
NEUTROPHILS %: 55 % (ref 39–77)
NUCLEATED RED BLOOD CELLS #: 0 10^3/UL (ref 0–0)
NUCLEATED RED BLOOD CELLS%: 0 /100WBC (ref 0–0)
PHOSPHORUS: 5.9 MG/DL (ref 2.5–4.9)
PLATELET COUNT: 145 10^3/UL (ref 140–415)
POTASSIUM: 4.6 MMOL/L (ref 3.5–5.1)
PTH-C SERPL-SCNC: 7.9 MG/DL (ref 8.6–10.3)
RED BLOOD COUNT: 2.66 10^6/UL (ref 4.7–6.1)
RED CELL DISTRIBUTION WIDTH: 12.3 % (ref 11.5–14.5)
SODIUM: 138 MMOL/L (ref 135–144)
WHITE BLOOD COUNT: 7.5 10^3/UL (ref 4.8–10.8)

## 2018-05-24 RX ADMIN — SEVELAMER CARBONATE 1 GM: 800 POWDER, FOR SUSPENSION ORAL at 11:52

## 2018-05-24 RX ADMIN — ASPIRIN 1 MG: 81 TABLET, COATED ORAL at 08:33

## 2018-05-24 RX ADMIN — INSULIN ASPART 1 UNIT: 100 INJECTION, SOLUTION INTRAVENOUS; SUBCUTANEOUS at 11:54

## 2018-05-24 RX ADMIN — ALBUTEROL SULFATE 1 PUFF: 90 AEROSOL, METERED RESPIRATORY (INHALATION) at 01:00

## 2018-05-24 RX ADMIN — OMEGA-3 FATTY ACIDS CAP DELAYED RELEASE 1000 MG 1 MG: 1000 CAPSULE DELAYED RELEASE at 08:33

## 2018-05-24 RX ADMIN — ALBUTEROL SULFATE 1 PUFF: 90 AEROSOL, METERED RESPIRATORY (INHALATION) at 13:53

## 2018-05-24 RX ADMIN — ALBUTEROL SULFATE 1 PUFF: 90 AEROSOL, METERED RESPIRATORY (INHALATION) at 08:35

## 2018-05-24 RX ADMIN — PANTOPRAZOLE SODIUM 1 MG: 40 TABLET, DELAYED RELEASE ORAL at 05:33

## 2018-05-24 RX ADMIN — SODIUM CITRATE AND CITRIC ACID MONOHYDRATE 1 ML: 500; 334 SOLUTION ORAL at 08:33

## 2018-05-24 RX ADMIN — AMLODIPINE BESYLATE 1 MG: 10 TABLET ORAL at 10:39

## 2018-05-24 RX ADMIN — FUROSEMIDE 1 MG: 10 INJECTION, SOLUTION INTRAVENOUS at 05:34

## 2018-05-24 RX ADMIN — FUROSEMIDE 1 MG: 10 INJECTION, SOLUTION INTRAVENOUS at 09:31

## 2018-05-24 RX ADMIN — ALBUTEROL SULFATE 1 PUFF: 90 AEROSOL, METERED RESPIRATORY (INHALATION) at 05:34

## 2018-05-24 RX ADMIN — INSULIN ASPART 1 UNIT: 100 INJECTION, SOLUTION INTRAVENOUS; SUBCUTANEOUS at 07:35

## 2018-05-24 RX ADMIN — SEVELAMER CARBONATE 1 GM: 800 POWDER, FOR SUSPENSION ORAL at 08:34

## 2018-05-24 RX ADMIN — FERROUS SULFATE TAB 325 MG (65 MG ELEMENTAL FE) 1 MG: 325 (65 FE) TAB at 08:34

## 2018-05-25 LAB — PTH-INTACT SERPL-SCNC: 375 PG/ML (ref 14–64)

## 2022-07-15 NOTE — PN
DATE:  12/03/2017

 

 

INFECTIOUS DISEASE PROGRESS NOTE

 

SUBJECTIVE:  No acute changes.  The patient is alert, feels good.  Denies pain.  Looks comfortable. 
 No fevers.  

 

LABORATORY DATA:  WBC 8.2, no shift, no bands.  BUN 49, creatinine 4.52.

 

ANTIMICROBIALS:  The patient is on clindamycin.

 

PHYSICAL EXAMINATION:

GENERAL:  Well-developed, middle-aged  man who is alert, in no distress.

HEENT:  Head atraumatic, normocephalic.  Sclerae anicteric.  Buccal mucosa pink.

NECK:  Supple.

CHEST:  Rise symmetrical.  Breath sounds clear.

HEART:  S1, S2.

ABDOMEN:  Soft.  Bowel tones present.

EXTREMITIES:  Left lower extremity, resolving erythema.

 

ASSESSMENT:

1.  Left lower extremity cellulitis, markedly improved.

2.  Acute kidney injury, possibly on chronic kidney disease.

3.  BPH.

4.  Diabetes.

5.  Hypertension.

 

PLAN:  Patient remains stable.  He is being seen by pulmonary and nephrology team.  Two-D echo revea
led ejection fraction of 60% with normal appearance of mitral, aortic and tricuspid valve.  Continue
 present care.  Keep left lower extremity elevated.  Follow recommendations of specialists.

 

 

Dictated By: YASMIN BOURNE NP for JERROLD DREYER MD

 

NI/NTS

DD:    12/03/2017 20:05:34

DT:    12/03/2017 20:42:39

Conf#: [id number]

DID#:  [id number]

CC: MARCOS BOYLE MD;*EndCC* Glaucoma Suspect-Based on IOP fam hx-IOP 22:21 01/05/22-Appears stable at this time. -OCT ONH and Pach ordered and performed today -Recommend follow up patient yearly with testing Cataract OU-Not yet surgical. -Reviewed symptoms of advancing cataract growth such as glare and halos and decreased vision.-Continue to monitor for now. Pt will notify us if any new symptoms develop. Ksicca Continue systane BID/PRN OU coupon given Presbyopia New Rx dispensed today ok to continue OTC readers Papilloma LLL Photos taken today Pt to kwasi byrd/Alondra at her convenience; Dr's Notes: Eric Hand NP.